# Patient Record
Sex: MALE | Race: WHITE | NOT HISPANIC OR LATINO | URBAN - METROPOLITAN AREA
[De-identification: names, ages, dates, MRNs, and addresses within clinical notes are randomized per-mention and may not be internally consistent; named-entity substitution may affect disease eponyms.]

---

## 2017-02-22 ENCOUNTER — OUTPATIENT (OUTPATIENT)
Dept: OUTPATIENT SERVICES | Facility: HOSPITAL | Age: 37
LOS: 1 days | Discharge: HOME | End: 2017-02-22

## 2017-06-27 DIAGNOSIS — R74.8 ABNORMAL LEVELS OF OTHER SERUM ENZYMES: ICD-10-CM

## 2017-06-27 DIAGNOSIS — E55.9 VITAMIN D DEFICIENCY, UNSPECIFIED: ICD-10-CM

## 2017-06-27 DIAGNOSIS — R53.81 OTHER MALAISE: ICD-10-CM

## 2017-06-27 DIAGNOSIS — M32.9 SYSTEMIC LUPUS ERYTHEMATOSUS, UNSPECIFIED: ICD-10-CM

## 2017-06-27 DIAGNOSIS — K90.0 CELIAC DISEASE: ICD-10-CM

## 2017-06-27 DIAGNOSIS — L40.50 ARTHROPATHIC PSORIASIS, UNSPECIFIED: ICD-10-CM

## 2017-06-27 DIAGNOSIS — M25.50 PAIN IN UNSPECIFIED JOINT: ICD-10-CM

## 2017-06-27 DIAGNOSIS — E29.1 TESTICULAR HYPOFUNCTION: ICD-10-CM

## 2017-06-27 DIAGNOSIS — Z00.00 ENCOUNTER FOR GENERAL ADULT MEDICAL EXAMINATION WITHOUT ABNORMAL FINDINGS: ICD-10-CM

## 2017-06-27 DIAGNOSIS — E03.9 HYPOTHYROIDISM, UNSPECIFIED: ICD-10-CM

## 2017-06-27 DIAGNOSIS — M35.9 SYSTEMIC INVOLVEMENT OF CONNECTIVE TISSUE, UNSPECIFIED: ICD-10-CM

## 2017-06-27 DIAGNOSIS — R94.6 ABNORMAL RESULTS OF THYROID FUNCTION STUDIES: ICD-10-CM

## 2017-06-27 DIAGNOSIS — R06.02 SHORTNESS OF BREATH: ICD-10-CM

## 2017-06-27 DIAGNOSIS — K51.00 ULCERATIVE (CHRONIC) PANCOLITIS WITHOUT COMPLICATIONS: ICD-10-CM

## 2017-06-27 DIAGNOSIS — E11.9 TYPE 2 DIABETES MELLITUS WITHOUT COMPLICATIONS: ICD-10-CM

## 2018-03-26 ENCOUNTER — OUTPATIENT (OUTPATIENT)
Dept: OUTPATIENT SERVICES | Facility: HOSPITAL | Age: 38
LOS: 1 days | Discharge: HOME | End: 2018-03-26

## 2018-03-26 VITALS
HEART RATE: 82 BPM | SYSTOLIC BLOOD PRESSURE: 188 MMHG | HEIGHT: 67 IN | RESPIRATION RATE: 20 BRPM | DIASTOLIC BLOOD PRESSURE: 94 MMHG | WEIGHT: 139.99 LBS | OXYGEN SATURATION: 98 % | TEMPERATURE: 97 F

## 2018-03-26 DIAGNOSIS — Z98.890 OTHER SPECIFIED POSTPROCEDURAL STATES: Chronic | ICD-10-CM

## 2018-03-26 DIAGNOSIS — Z01.818 ENCOUNTER FOR OTHER PREPROCEDURAL EXAMINATION: ICD-10-CM

## 2018-03-26 LAB
ANION GAP SERPL CALC-SCNC: 13 MMOL/L — SIGNIFICANT CHANGE UP (ref 7–14)
BASOPHILS # BLD AUTO: 0.04 K/UL — SIGNIFICANT CHANGE UP (ref 0–0.2)
BASOPHILS NFR BLD AUTO: 0.7 % — SIGNIFICANT CHANGE UP (ref 0–1)
BUN SERPL-MCNC: 17 MG/DL — SIGNIFICANT CHANGE UP (ref 10–20)
CALCIUM SERPL-MCNC: 9.1 MG/DL — SIGNIFICANT CHANGE UP (ref 8.5–10.1)
CHLORIDE SERPL-SCNC: 100 MMOL/L — SIGNIFICANT CHANGE UP (ref 98–110)
CO2 SERPL-SCNC: 27 MMOL/L — SIGNIFICANT CHANGE UP (ref 17–32)
CREAT SERPL-MCNC: 1 MG/DL — SIGNIFICANT CHANGE UP (ref 0.7–1.5)
EOSINOPHIL # BLD AUTO: 0.06 K/UL — SIGNIFICANT CHANGE UP (ref 0–0.7)
EOSINOPHIL NFR BLD AUTO: 1 % — SIGNIFICANT CHANGE UP (ref 0–8)
GLUCOSE SERPL-MCNC: 67 MG/DL — LOW (ref 70–99)
HCT VFR BLD CALC: 44 % — SIGNIFICANT CHANGE UP (ref 42–52)
HGB BLD-MCNC: 14.4 G/DL — SIGNIFICANT CHANGE UP (ref 14–18)
IMM GRANULOCYTES NFR BLD AUTO: 0.2 % — SIGNIFICANT CHANGE UP (ref 0.1–0.3)
LYMPHOCYTES # BLD AUTO: 1.51 K/UL — SIGNIFICANT CHANGE UP (ref 1.2–3.4)
LYMPHOCYTES # BLD AUTO: 26.3 % — SIGNIFICANT CHANGE UP (ref 20.5–51.1)
MCHC RBC-ENTMCNC: 25.7 PG — LOW (ref 27–31)
MCHC RBC-ENTMCNC: 32.7 G/DL — SIGNIFICANT CHANGE UP (ref 32–37)
MCV RBC AUTO: 78.6 FL — LOW (ref 80–94)
MONOCYTES # BLD AUTO: 0.38 K/UL — SIGNIFICANT CHANGE UP (ref 0.1–0.6)
MONOCYTES NFR BLD AUTO: 6.6 % — SIGNIFICANT CHANGE UP (ref 1.7–9.3)
NEUTROPHILS # BLD AUTO: 3.75 K/UL — SIGNIFICANT CHANGE UP (ref 1.4–6.5)
NEUTROPHILS NFR BLD AUTO: 65.2 % — SIGNIFICANT CHANGE UP (ref 42.2–75.2)
NRBC # BLD: 0 /100 WBCS — SIGNIFICANT CHANGE UP (ref 0–0)
PLATELET # BLD AUTO: 323 K/UL — SIGNIFICANT CHANGE UP (ref 130–400)
POTASSIUM SERPL-MCNC: 4.3 MMOL/L — SIGNIFICANT CHANGE UP (ref 3.5–5)
POTASSIUM SERPL-SCNC: 4.3 MMOL/L — SIGNIFICANT CHANGE UP (ref 3.5–5)
RBC # BLD: 5.6 M/UL — SIGNIFICANT CHANGE UP (ref 4.7–6.1)
RBC # FLD: 13.5 % — SIGNIFICANT CHANGE UP (ref 11.5–14.5)
SODIUM SERPL-SCNC: 140 MMOL/L — SIGNIFICANT CHANGE UP (ref 135–146)
WBC # BLD: 5.75 K/UL — SIGNIFICANT CHANGE UP (ref 4.8–10.8)
WBC # FLD AUTO: 5.75 K/UL — SIGNIFICANT CHANGE UP (ref 4.8–10.8)

## 2018-03-26 NOTE — H&P PST ADULT - PMH
Bladder disease or syndrome  congenital bladder exotrophy Bladder disease or syndrome  congenital bladder exotrophy  Eczema

## 2018-03-26 NOTE — H&P PST ADULT - REASON FOR ADMISSION
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

## 2018-03-26 NOTE — H&P PST ADULT - PSH
History of bladder surgery  bladder reconstruction surgery [id number]  History of colon resection  s/p bowel obstruction 2008 History of bladder surgery  multiple bladder reconstruction surgery [id number]  History of colon resection  s/p bowel obstruction 2008

## 2018-03-27 NOTE — ASU PATIENT PROFILE, ADULT - PSH
History of bladder surgery  multiple bladder reconstruction surgery [id number]  History of colon resection  s/p bowel obstruction 2008

## 2018-03-28 ENCOUNTER — OUTPATIENT (OUTPATIENT)
Dept: OUTPATIENT SERVICES | Facility: HOSPITAL | Age: 38
LOS: 1 days | Discharge: HOME | End: 2018-03-28

## 2018-03-28 ENCOUNTER — RESULT REVIEW (OUTPATIENT)
Age: 38
End: 2018-03-28

## 2018-03-28 VITALS
RESPIRATION RATE: 17 BRPM | OXYGEN SATURATION: 99 % | SYSTOLIC BLOOD PRESSURE: 158 MMHG | TEMPERATURE: 98 F | HEIGHT: 67 IN | WEIGHT: 139.99 LBS | HEART RATE: 85 BPM | DIASTOLIC BLOOD PRESSURE: 100 MMHG

## 2018-03-28 VITALS
HEART RATE: 80 BPM | DIASTOLIC BLOOD PRESSURE: 96 MMHG | RESPIRATION RATE: 19 BRPM | OXYGEN SATURATION: 99 % | TEMPERATURE: 98 F | SYSTOLIC BLOOD PRESSURE: 160 MMHG
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
## 2018-03-28 DIAGNOSIS — Z98.890 OTHER SPECIFIED POSTPROCEDURAL STATES: Chronic | ICD-10-CM

## 2018-03-28 DIAGNOSIS — S83.241A OTHER TEAR OF MEDIAL MENISCUS, CURRENT INJURY, RIGHT KNEE, INITIAL ENCOUNTER: ICD-10-CM

## 2018-03-28 DIAGNOSIS — M65.862 OTHER SYNOVITIS AND TENOSYNOVITIS, LEFT LOWER LEG: ICD-10-CM
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
## 2018-03-28 RX ORDER — ONDANSETRON 8 MG/1
4 TABLET, FILM COATED ORAL ONCE
Qty: 0 | Refills: 0 | Status: DISCONTINUED | OUTPATIENT
Start: 2018-03-28 | End: 2018-04-12

## 2018-03-28 RX ORDER — MORPHINE SULFATE 50 MG/1
4 CAPSULE, EXTENDED RELEASE ORAL
Qty: 0 | Refills: 0 | Status: DISCONTINUED | OUTPATIENT
Start: 2018-03-28 | End: 2018-03-28

## 2018-03-28 RX ORDER — SODIUM CHLORIDE 9 MG/ML
1000 INJECTION, SOLUTION INTRAVENOUS
Qty: 0 | Refills: 0 | Status: DISCONTINUED | OUTPATIENT
Start: 2018-03-28 | End: 2018-04-12

## 2018-03-28 RX ORDER — OXYCODONE AND ACETAMINOPHEN 5; 325 MG/1; MG/1
2 TABLET ORAL ONCE
Qty: 0 | Refills: 0 | Status: DISCONTINUED | OUTPATIENT
Start: 2018-03-28 | End: 2018-03-28

## 2018-03-28 RX ORDER — MEPERIDINE HYDROCHLORIDE 50 MG/ML
25 INJECTION INTRAMUSCULAR; INTRAVENOUS; SUBCUTANEOUS ONCE
Qty: 0 | Refills: 0 | Status: DISCONTINUED | OUTPATIENT
Start: 2018-03-28 | End: 2018-03-28

## 2018-03-28 RX ORDER — MORPHINE SULFATE 50 MG/1
2 CAPSULE, EXTENDED RELEASE ORAL
Qty: 0 | Refills: 0 | Status: DISCONTINUED | OUTPATIENT
Start: 2018-03-28 | End: 2018-03-28

## 2018-03-28 RX ADMIN — MORPHINE SULFATE 2 MILLIGRAM(S): 50 CAPSULE, EXTENDED RELEASE ORAL at 16:14

## 2018-03-28 RX ADMIN — OXYCODONE AND ACETAMINOPHEN 2 TABLET(S): 5; 325 TABLET ORAL at 17:12

## 2018-03-28 RX ADMIN — MORPHINE SULFATE 4 MILLIGRAM(S): 50 CAPSULE, EXTENDED RELEASE ORAL at 15:26

## 2018-03-28 RX ADMIN — SODIUM CHLORIDE 100 MILLILITER(S): 9 INJECTION, SOLUTION INTRAVENOUS at 15:25

## 2018-03-28 RX ADMIN — MORPHINE SULFATE 2 MILLIGRAM(S): 50 CAPSULE, EXTENDED RELEASE ORAL at 15:55

## 2018-03-28 NOTE — ASU DISCHARGE PLAN (ADULT/PEDIATRIC). - SPECIAL INSTRUCTIONS
Post Operative Instructions for Knee Surgery    Your Surgery Included  [ ] Menisectomy  [ ] Meniscus Repair					  [ ] Synovectomy/Plica Excision	  [ ] Debridement/Chondroplasty  [ ] Lysis of Adhesions  [ ] ACL reconstruction			  [ ] PCL Reconstruction  [ ] Other:  	  Call our office ([phone]) immediately if you experience any of the following:  •	Excessive bleeding or pus like drainage at the incision site  •	Uncontrollable pain not relieved by pain medication  •	Excessive swelling or redness at the incision site  •	Fever above 101.5 degrees not controlled with Tylenol or Motrin  •	Shortness of Breath  •	Any foul odor or blistering from the surgery site    Pain Management: You were given one or more prescriptions before leaving the hospital. Have the prescriptions filled at a pharmacy on your way home and follow the instructions on the bottles.   Regional Anesthesia Injections (Blocks): You may have been given a regional nerve block either before or after surgery. This may numb your leg for 24-36 hours  	*Proceed with caution when weight bearing on your leg    Diet: Eat a bland diet for the first day after surgery. Progress your diet as tolerated. Constipation may occur with Narcotic usage, contact our office if you are experiencing constipation.    Activity: Limit your activity during the first 48 hours, keep your leg elevated with pillows under your heel. After the first 48 hours at home, increase your activity level based on your symptoms.    Dressing Change: Remove the dressing on the 3rd day. It is normal for some blood to be seen on the dressings. It is also normal for you to see apparent bruising on the skin around your knee when you remove the dressing. If present, leave the steri-strip tape across the incisions. If you are concerned by the drainage or the appearance of your knee, please call one of the numbers listed. Keep covered with Band-Aids/bandages.    Showering: You may shower on the 5th day after surgery if the wound is dry and clean, but do not let the wound soak in water until sutures are removed. Do not submerge in any water until after your postoperative appointment in clinic.    Weight Bearing:						  [ ] Weight bearing as tolerated		  [ ] Nonweight bearing				  [ ] Other:						    Operative Knee Range of Motion  [ ] Full range of motion  [ ] ROM 0-90 deg  [ ] Other:    Knee Exercises: You may do these exercises for 2-5 mins five times a day in order to help regain your range of motion.  [ ] Quad Sets: Begin activating your quadriceps muscle by driving your knee downward into full knee extension while seated on a table or bed   with a towel rolled and propped under your heel  [ ] Straight Leg Raise: While alon your quadriceps muscle, lift your fully extended leg to the level of your non-operative knee  [ ] Heel Slides: With the knee straight, slide your heel slowly toward your buttocks, hold at the endpoint for 10-15 seconds, then slowly straighten  [ ] Ankle Pumps: With your knee straight, move your ankle in a "pumping"  fashion to activate your calf and leg muscle     Follow Up: As Scheduled

## 2018-03-28 NOTE — BRIEF OPERATIVE NOTE - PROCEDURE
<<-----Click on this checkbox to enter Procedure Arthroscopic partial synovectomy of knee  03/28/2018    Active  GERMAIN

## 2018-03-28 NOTE — BRIEF OPERATIVE NOTE - POST-OP DX
Complex tear of medial meniscus of right knee as current injury, subsequent encounter  03/28/2018    Active  Jose Ribeiro  Synovitis of right knee  03/28/2018    Active  Jose Ribeiro

## 2018-03-28 NOTE — BRIEF OPERATIVE NOTE - PRE-OP DX
Complex tear of medial meniscus of right knee as current injury, subsequent encounter  03/28/2018    Active  Jose Ribeiro

## 2018-04-07 LAB — SURGICAL PATHOLOGY STUDY: SIGNIFICANT CHANGE UP

## 2018-12-14 ENCOUNTER — RX RENEWAL (OUTPATIENT)
Age: 38
End: 2018-12-14

## 2018-12-14 PROBLEM — Z00.00 ENCOUNTER FOR PREVENTIVE HEALTH EXAMINATION: Status: ACTIVE | Noted: 2018-12-14

## 2019-01-15 ENCOUNTER — RX RENEWAL (OUTPATIENT)
Age: 39
End: 2019-01-15

## 2019-01-17 NOTE — ASU PREOP CHECKLIST - HEART RATE (BEATS/MIN)
Migraine Headache   WHAT YOU SHOULD KNOW:   A migraine is a severe headache  The pain can be so severe that it interferes with your daily activities  A migraine can last a few hours up to several days  The exact cause of migraines is not known  It may be caused by changes in your body chemicals and extra sensitive nerves in your brain  AFTER YOU LEAVE:   Medicines:  Take medicine as soon as you feel a migraine begin  · Pain medicine: You may need medicine to take away or decrease pain  You may need a doctor's order for this medicine  Do not wait until the pain is severe before you take your medicine  · Migraine medicines: These are used to help prevent a migraine or stop it once it starts  · Antinausea medicine: This medicine may be given to calm your stomach and to help prevent vomiting  They can also help relieve pain  · Take your medicine as directed  Call your healthcare provider if you think your medicine is not helping or if you have side effects  Tell him if you are allergic to any medicine  Keep a list of the medicines, vitamins, and herbs you take  Include the amounts, and when and why you take them  Bring the list or the pill bottles to follow-up visits  Carry your medicine list with you in case of an emergency  Manage your symptoms:   · Rest:  Rest in a dark, quiet room  This will help decrease your pain  · Ice:  Ice helps decrease pain  Use an ice pack or put crushed ice in a plastic bag  Cover the ice pack with a towel and place it on your head where it hurts for 15 to 20 minutes every hour  · Heat:  Heat helps decrease pain and muscle spasms  Use a small towel dampened with warm water or a heating pad, or sit in a warm bath  Apply heat on the area for 20 to 30 minutes every 2 hours  You may alternate heat and ice  Keep a headache diary:  Write down when your migraines start and stop  Include your symptoms and what you were doing when a migraine began   Record what you ate or drank for 24 hours before the migraine started  Describe the pain and where it hurts  Keep track of what you did to treat your migraine and whether it worked  Follow up with your primary healthcare provider or neurologist as directed:  Bring your headache diary with you when you see your primary healthcare provider  Write down your questions so you remember to ask them during your visits  Prevent another migraine:   · Do not smoke: If you smoke, it is never too late to quit  Tobacco smoke can trigger a migraine  It can also cause heart disease, lung disease, cancer, and other health problems  Quitting smoking will improve your health and the health of those around you  If you smoke, ask for information about how to stop  · Do not drink alcohol:  Alcohol can trigger a migraine  It can also interfere with the medicines used to treat your migraine  · Get regular exercise:  Exercise may help prevent migraines  Talk to your primary healthcare provider about the best exercise plan for you  · Manage stress:  Stress may trigger a migraine  Learn new ways to relax, such as deep breathing  · Stick to a sleep schedule:  Go to bed and get up at the same time each day  · Eat regular meals:  Include healthy foods such as include fruit, vegetables, whole-grain breads, low-fat dairy products, beans, lean meat, and fish  Avoid trigger foods like chocolate, hard cheese, and red wine  Foods that contain gluten, nitrates, MSG, or artificial sweeteners may also trigger migraines  Caffeine, which is often used to treat migraines, can also trigger them  Contact your primary healthcare provider or neurologist if:   · You have a fever  · Your migraines interfere with your daily activities  · Your medicines or treatments stop working  · You have questions about your condition or care    Seek care immediately or call 911 if:   · You have a headache that seems different or much worse than your usual migraine headache  · You have a severe headache with a fever or a stiff neck  · You have new problems with speech, vision, balance, or movement  · You feel like you are going to faint, you become confused, or you have a seizure  © 2014 [address] is for End User's use only and may not be sold, redistributed or otherwise used for commercial purposes  All illustrations and images included in CareNotes® are the copyrighted property of A D A M , Inc  or Jose Luis Blanco  The above information is an  only  It is not intended as medical advice for individual conditions or treatments  Talk to your doctor, nurse or pharmacist before following any medical regimen to see if it is safe and effective for you  85

## 2019-04-12 ENCOUNTER — MESSAGE (OUTPATIENT)
Age: 39
End: 2019-04-12

## 2019-05-01 ENCOUNTER — OUTPATIENT (OUTPATIENT)
Dept: OUTPATIENT SERVICES | Facility: HOSPITAL | Age: 39
LOS: 1 days | Discharge: HOME | End: 2019-05-01

## 2019-05-01 DIAGNOSIS — Z98.890 OTHER SPECIFIED POSTPROCEDURAL STATES: Chronic | ICD-10-CM

## 2019-05-02 DIAGNOSIS — N41.0 ACUTE PROSTATITIS: ICD-10-CM

## 2019-05-02 PROBLEM — L30.9 DERMATITIS, UNSPECIFIED: Chronic | Status: ACTIVE | Noted: 2018-03-26

## 2019-05-08 LAB — BACTERIA WND CULT: ABNORMAL

## 2019-05-12 ENCOUNTER — OUTPATIENT (OUTPATIENT)
Dept: OUTPATIENT SERVICES | Facility: HOSPITAL | Age: 39
LOS: 1 days | Discharge: HOME | End: 2019-05-12
Payer: COMMERCIAL

## 2019-05-12 DIAGNOSIS — Z98.890 OTHER SPECIFIED POSTPROCEDURAL STATES: Chronic | ICD-10-CM

## 2019-05-12 DIAGNOSIS — R36.9 URETHRAL DISCHARGE, UNSPECIFIED: ICD-10-CM

## 2019-05-12 PROCEDURE — 72196 MRI PELVIS W/DYE: CPT | Mod: 26

## 2019-05-17 ENCOUNTER — RX RENEWAL (OUTPATIENT)
Age: 39
End: 2019-05-17

## 2019-05-23 ENCOUNTER — LABORATORY RESULT (OUTPATIENT)
Age: 39
End: 2019-05-23

## 2019-05-24 ENCOUNTER — OUTPATIENT (OUTPATIENT)
Dept: OUTPATIENT SERVICES | Facility: HOSPITAL | Age: 39
LOS: 1 days | Discharge: HOME | End: 2019-05-24

## 2019-05-24 DIAGNOSIS — Z98.890 OTHER SPECIFIED POSTPROCEDURAL STATES: Chronic | ICD-10-CM

## 2019-05-24 DIAGNOSIS — N39.0 URINARY TRACT INFECTION, SITE NOT SPECIFIED: ICD-10-CM

## 2019-05-29 LAB
APPEARANCE: ABNORMAL
BACTERIA UR CULT: NORMAL
BILIRUBIN URINE: NEGATIVE
BLOOD URINE: NEGATIVE
COLOR: YELLOW
GLUCOSE QUALITATIVE U: NEGATIVE MG/DL
KETONES URINE: NEGATIVE
LEUKOCYTE ESTERASE URINE: NEGATIVE
NITRITE URINE: NEGATIVE
PH URINE: 6.5
PROTEIN URINE: NEGATIVE MG/DL
SPECIFIC GRAVITY URINE: 1.01
UROBILINOGEN URINE: 0.2 MG/DL (ref 0.2–?)

## 2019-05-30 ENCOUNTER — RX RENEWAL (OUTPATIENT)
Age: 39
End: 2019-05-30

## 2019-06-03 RX ORDER — LEVOFLOXACIN 500 MG/1
500 TABLET, FILM COATED ORAL DAILY
Qty: 5 | Refills: 1 | Status: COMPLETED | COMMUNITY
Start: 2018-12-14 | End: 2019-06-03

## 2019-06-03 RX ORDER — AMOXICILLIN 500 MG/1
500 TABLET, FILM COATED ORAL
Qty: 20 | Refills: 0 | Status: COMPLETED | COMMUNITY
Start: 2019-05-08 | End: 2019-06-03

## 2019-06-03 RX ORDER — CEFPODOXIME PROXETIL 200 MG/1
200 TABLET, FILM COATED ORAL
Qty: 14 | Refills: 0 | Status: COMPLETED | COMMUNITY
Start: 2019-01-15 | End: 2019-06-03

## 2019-06-03 RX ORDER — CEFPODOXIME PROXETIL 200 MG/1
200 TABLET, FILM COATED ORAL TWICE DAILY
Qty: 28 | Refills: 0 | Status: COMPLETED | COMMUNITY
Start: 2019-04-12 | End: 2019-06-03

## 2019-06-04 ENCOUNTER — APPOINTMENT (OUTPATIENT)
Dept: UROLOGY | Facility: HOSPITAL | Age: 39
End: 2019-06-04
Payer: COMMERCIAL

## 2019-06-04 ENCOUNTER — RESULT REVIEW (OUTPATIENT)
Age: 39
End: 2019-06-04

## 2019-06-04 ENCOUNTER — OUTPATIENT (OUTPATIENT)
Dept: OUTPATIENT SERVICES | Facility: HOSPITAL | Age: 39
LOS: 1 days | Discharge: HOME | End: 2019-06-04
Payer: COMMERCIAL

## 2019-06-04 VITALS
RESPIRATION RATE: 18 BRPM | HEART RATE: 73 BPM | DIASTOLIC BLOOD PRESSURE: 87 MMHG | TEMPERATURE: 98 F | HEIGHT: 67 IN | SYSTOLIC BLOOD PRESSURE: 136 MMHG | WEIGHT: 134.92 LBS

## 2019-06-04 VITALS — SYSTOLIC BLOOD PRESSURE: 117 MMHG | RESPIRATION RATE: 20 BRPM | HEART RATE: 57 BPM | DIASTOLIC BLOOD PRESSURE: 78 MMHG

## 2019-06-04 DIAGNOSIS — Z98.890 OTHER SPECIFIED POSTPROCEDURAL STATES: Chronic | ICD-10-CM
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
## 2019-06-04 PROCEDURE — 88305 TISSUE EXAM BY PATHOLOGIST: CPT | Mod: 26

## 2019-06-04 PROCEDURE — 52310 CYSTOSCOPY AND TREATMENT: CPT

## 2019-06-04 RX ORDER — HYDROMORPHONE HYDROCHLORIDE 2 MG/ML
0.5 INJECTION INTRAMUSCULAR; INTRAVENOUS; SUBCUTANEOUS
Refills: 0 | Status: DISCONTINUED | OUTPATIENT
Start: 2019-06-04 | End: 2019-06-04

## 2019-06-04 RX ORDER — SODIUM CHLORIDE 9 MG/ML
1000 INJECTION, SOLUTION INTRAVENOUS
Refills: 0 | Status: DISCONTINUED | OUTPATIENT
Start: 2019-06-04 | End: 2019-06-04

## 2019-06-04 RX ORDER — ONDANSETRON 8 MG/1
4 TABLET, FILM COATED ORAL
Refills: 0 | Status: DISCONTINUED | OUTPATIENT
Start: 2019-06-04 | End: 2019-06-04

## 2019-06-04 RX ORDER — HYDROMORPHONE HYDROCHLORIDE 2 MG/ML
1 INJECTION INTRAMUSCULAR; INTRAVENOUS; SUBCUTANEOUS
Refills: 0 | Status: DISCONTINUED | OUTPATIENT
Start: 2019-06-04 | End: 2019-06-04

## 2019-06-04 RX ORDER — MEPERIDINE HYDROCHLORIDE 50 MG/ML
12.5 INJECTION INTRAMUSCULAR; INTRAVENOUS; SUBCUTANEOUS ONCE
Refills: 0 | Status: DISCONTINUED | OUTPATIENT
Start: 2019-06-04 | End: 2019-06-04

## 2019-06-04 RX ADMIN — HYDROMORPHONE HYDROCHLORIDE 1 MILLIGRAM(S): 2 INJECTION INTRAMUSCULAR; INTRAVENOUS; SUBCUTANEOUS at 16:00

## 2019-06-04 RX ADMIN — HYDROMORPHONE HYDROCHLORIDE 1 MILLIGRAM(S): 2 INJECTION INTRAMUSCULAR; INTRAVENOUS; SUBCUTANEOUS at 15:15

## 2019-06-04 RX ADMIN — HYDROMORPHONE HYDROCHLORIDE 1 MILLIGRAM(S): 2 INJECTION INTRAMUSCULAR; INTRAVENOUS; SUBCUTANEOUS at 15:30

## 2019-06-04 RX ADMIN — HYDROMORPHONE HYDROCHLORIDE 0.5 MILLIGRAM(S): 2 INJECTION INTRAMUSCULAR; INTRAVENOUS; SUBCUTANEOUS at 15:05

## 2019-06-04 RX ADMIN — MEPERIDINE HYDROCHLORIDE 12.5 MILLIGRAM(S): 50 INJECTION INTRAMUSCULAR; INTRAVENOUS; SUBCUTANEOUS at 15:15

## 2019-06-04 RX ADMIN — SODIUM CHLORIDE 150 MILLILITER(S): 9 INJECTION, SOLUTION INTRAVENOUS at 15:05

## 2019-06-04 RX ADMIN — MEPERIDINE HYDROCHLORIDE 12.5 MILLIGRAM(S): 50 INJECTION INTRAMUSCULAR; INTRAVENOUS; SUBCUTANEOUS at 15:04

## 2019-06-04 RX ADMIN — HYDROMORPHONE HYDROCHLORIDE 0.5 MILLIGRAM(S): 2 INJECTION INTRAMUSCULAR; INTRAVENOUS; SUBCUTANEOUS at 15:15

## 2019-06-04 NOTE — BRIEF OPERATIVE NOTE - NSICDXBRIEFPOSTOP_GEN_ALL_CORE_FT
POST-OP DIAGNOSIS:  Chronic prostatitis 04-Jun-2019 15:09:50  Dinh Clemons  Urethral discharge 04-Jun-2019 15:09:40  Dinh Clemons

## 2019-06-04 NOTE — PRE-ANESTHESIA EVALUATION ADULT - NSANTHOSAYNRD_GEN_A_CORE
No. ELSY screening performed.  STOP BANG Legend: 0-2 = LOW Risk; 3-4 = INTERMEDIATE Risk; 5-8 = HIGH Risk

## 2019-06-04 NOTE — CHART NOTE - NSCHARTNOTEFT_GEN_A_CORE
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, and urethral syndrome      ____  Intubated  TV:______       Rate: ______      FiO2: ______    _x___  Patent Airway    ____  Full return of protective reflexes    ____  Full recovery from anesthesia / back to baseline     Vitals:   T:  98         R:       12           BP:         140/86          Sat:    100%                P:  99      Mental Status:  __x__ Awake   _____ Alert   _____ Drowsy   _____ Sedated    Nausea/Vomiting:  _x___ NO  ______Yes,   See Post - Op Orders          Pain Scale (0-10):  _____    Treatment: ____ None    __x__ See Post - Op/PCA Orders    Post - Operative Fluids:   ____ Oral   ___x_ See Post - Op Orders    Plan: Discharge:   __x__Home       _____Floor     _____Critical Care    _____  Other:_________________    Comments: Uneventful intraoperative course. No anesthesia issues or complications noted.  Patient stable upon arrival to PACU. Report given to RN. Discharge when criteria met.

## 2019-06-04 NOTE — BRIEF OPERATIVE NOTE - NSICDXBRIEFPREOP_GEN_ALL_CORE_FT
PRE-OP DIAGNOSIS:  Urethral discharge in male 04-Jun-2019 15:08:28  Dinh Clemons  Urethral discharge 04-Jun-2019 15:05:21  Dinh Clemons

## 2019-06-04 NOTE — ASU PATIENT PROFILE, ADULT - PSH
History of bladder surgery  multiple bladder reconstruction surgery [id number]  History of colon resection  s/p bowel obstruction 2008

## 2019-06-04 NOTE — BRIEF OPERATIVE NOTE - OPERATION/FINDINGS
bulbous urethra with matted hair  prostatic massage leads to expression of cloudy fluid from BOTH ejaculatory ducts

## 2019-06-04 NOTE — ASU DISCHARGE PLAN (ADULT/PEDIATRIC) - CALL YOUR DOCTOR IF YOU HAVE ANY OF THE FOLLOWING:
Fever greater than (need to indicate Fahrenheit or Celsius)/Inability to tolerate liquids or foods/Bleeding that does not stop/Nausea and vomiting that does not stop/Swelling that gets worse

## 2019-06-04 NOTE — PRE-ANESTHESIA EVALUATION ADULT - NSANTHADDINFOFT_GEN_ALL_CORE
38 y/o WM evaluated for cystoscopy.  ASA 2.  Plan [place].  Plan, benefits, foreseeable risks, viable alternatives discussed with patient and all his pertinent questions answered and he understands and elects to proceed.

## 2019-06-04 NOTE — BRIEF OPERATIVE NOTE - NSICDXBRIEFPROCEDURE_GEN_ALL_CORE_FT
PROCEDURES:  Exam under anesthesia, rectum 04-Jun-2019 15:05:00  Dinh Clemons  Correction of trichiasis by epilation with forceps 04-Jun-2019 15:04:06  Dinh Clemons  Cystoscopy in adult 04-Jun-2019 15:03:29  Dinh Clemons

## 2019-06-04 NOTE — ASU DISCHARGE PLAN (ADULT/PEDIATRIC) - CARE PROVIDER_API CALL
Dinh Clemons)  Urology  [address] [place] [address], [address]  [place] [address]  Phone: [phone]  Fax: [phone]  Follow Up Time:

## 2019-06-07 LAB — SURGICAL PATHOLOGY STUDY: SIGNIFICANT CHANGE UP

## 2019-06-08 DIAGNOSIS — N41.1 CHRONIC PROSTATITIS: ICD-10-CM

## 2019-06-08 DIAGNOSIS — R36.9 URETHRAL DISCHARGE, UNSPECIFIED: ICD-10-CM

## 2019-06-08 DIAGNOSIS — I10 ESSENTIAL (PRIMARY) HYPERTENSION: ICD-10-CM

## 2019-06-08 DIAGNOSIS — T19.0XXA FOREIGN BODY IN URETHRA, INITIAL ENCOUNTER: ICD-10-CM

## 2019-06-08 DIAGNOSIS — Z91.040 LATEX ALLERGY STATUS: ICD-10-CM
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
## 2019-06-12 ENCOUNTER — CHART COPY (OUTPATIENT)
Age: 39
End: 2019-06-12

## 2019-06-18 ENCOUNTER — FORM ENCOUNTER (OUTPATIENT)
Age: 39
End: 2019-06-18

## 2019-06-19 ENCOUNTER — OUTPATIENT (OUTPATIENT)
Dept: OUTPATIENT SERVICES | Facility: HOSPITAL | Age: 39
LOS: 1 days | Discharge: HOME | End: 2019-06-19
Payer: COMMERCIAL

## 2019-06-19 DIAGNOSIS — N41.0 ACUTE PROSTATITIS: ICD-10-CM

## 2019-06-19 DIAGNOSIS — Z98.890 OTHER SPECIFIED POSTPROCEDURAL STATES: Chronic | ICD-10-CM

## 2019-06-19 PROCEDURE — 76872 US TRANSRECTAL: CPT | Mod: 26

## 2019-06-19 PROCEDURE — 76856 US EXAM PELVIC COMPLETE: CPT | Mod: 26

## 2019-06-21 ENCOUNTER — OUTPATIENT (OUTPATIENT)
Dept: OUTPATIENT SERVICES | Facility: HOSPITAL | Age: 39
LOS: 1 days | Discharge: HOME | End: 2019-06-21

## 2019-06-21 ENCOUNTER — OTHER (OUTPATIENT)
Age: 39
End: 2019-06-21

## 2019-06-21 ENCOUNTER — APPOINTMENT (OUTPATIENT)
Dept: UROLOGY | Facility: CLINIC | Age: 39
End: 2019-06-21
Payer: COMMERCIAL

## 2019-06-21 ENCOUNTER — LABORATORY RESULT (OUTPATIENT)
Age: 39
End: 2019-06-21

## 2019-06-21 DIAGNOSIS — N39.0 URINARY TRACT INFECTION, SITE NOT SPECIFIED: ICD-10-CM

## 2019-06-21 DIAGNOSIS — R10.9 UNSPECIFIED ABDOMINAL PAIN: ICD-10-CM

## 2019-06-21 DIAGNOSIS — Z98.890 OTHER SPECIFIED POSTPROCEDURAL STATES: Chronic | ICD-10-CM

## 2019-06-21 DIAGNOSIS — Z82.49 FAMILY HISTORY OF ISCHEMIC HEART DISEASE AND OTHER DISEASES OF THE CIRCULATORY SYSTEM: ICD-10-CM

## 2019-06-21 DIAGNOSIS — Z78.9 OTHER SPECIFIED HEALTH STATUS: ICD-10-CM

## 2019-06-21 DIAGNOSIS — R36.9 URETHRAL DISCHARGE, UNSPECIFIED: ICD-10-CM

## 2019-06-21 PROCEDURE — 99214 OFFICE O/P EST MOD 30 MIN: CPT

## 2019-06-21 NOTE — LETTER BODY
[Dear  ___] : Dear  [unfilled], [Courtesy Letter:] : I had the pleasure of seeing your patient, [unfilled], in my office today. [Please see my note below.] : Please see my note below. [Sincerely,] : Sincerely, [FreeTextEntry2] : Rohit Bowden M.D.\par [address] [facility] [address]\[place] [address] \par

## 2019-06-21 NOTE — ASSESSMENT
[FreeTextEntry1] : John history is quite complex and he understands that I am not a pediatric urologist and have limited experience with Epispadius is and exstrophy closure. However there is no one on [place] that deals with adults who have had such surgery and he prefers that we manage it how we can and I have discussed the case with Dr. Emery. My impression is that he has a smoldering prostatitis and thankfully the trans rectal ultrasound done earlier this week did not show any evidence of an abscess or corpora amalacia so surgical resection is it needed. However he did not have pus in the urethra the pus was coming out of the ejaculatory ducts so we going to treat this like I would any chronic prostatitis. He will get one month of full force i.e. amoxicillin twice per day, if he responds well in a month is no further expressed prostatic secretions then I’ll switch him to 250 per night for two months and will see if that works. If it does not he may have to go seek [facility] for example one of the doctors at [facility] are does do this or we may see if someone out at [facility] has experience with this but I don’t think so.

## 2019-06-21 NOTE — HISTORY OF PRESENT ILLNESS
[3] : 3 [Right Flank] : right flank [Right Lower Back] : right lower back [Dull] : dull [Gradual] : gradual [Constant] : constantly [None] : No exacerbating factors are noted [Analgesics] : analgesics [FreeTextEntry1] : John is a 31-year-old male, with a history of bladder exstrophy status post multiple bladder augments in the past, who we have been following for recurrent urinary tract infection, questionable prostatitis, and history of ureteral discharge. He  is status post cystoscopy, with removal of hair, secondary to his hypospadias repair, in June 2019. Post procedure he was doing well without any complications. He recently underwent a TRUS of the prostate, to evaluate for fluid collection, which was negative for abscess or calcifications with prostatitis. He presents today complaining of suprapubic pressure with right flank pain. He denies any nausea, vomiting, chills, fever, light headedness, dizziness, gross hematuria, or further urological/constitutional symptoms. [de-identified] : Pressure [de-identified] : Catheterizations

## 2019-06-21 NOTE — LETTER HEADER
[FreeTextEntry3] : Dinh Clemons M.D.\par Director of Urology\par [facility]\par [address] [place] [address], [address]\[place] [address]

## 2019-06-21 NOTE — PHYSICAL EXAM
[General Appearance - Well Developed] : well developed [General Appearance - Well Nourished] : well nourished [Normal Appearance] : normal appearance [Well Groomed] : well groomed [General Appearance - In No Acute Distress] : no acute distress [Abdomen Soft] : soft [Costovertebral Angle Tenderness] : no ~M costovertebral angle tenderness [Abdomen Tenderness] : non-tender [Testes Tenderness] : no tenderness of the testes [] : no respiratory distress [Respiration, Rhythm And Depth] : normal respiratory rhythm and effort [Exaggerated Use Of Accessory Muscles For Inspiration] : no accessory muscle use [Oriented To Time, Place, And Person] : oriented to person, place, and time [Mood] : the mood was normal [Affect] : the affect was normal [Not Anxious] : not anxious [Normal Station and Gait] : the gait and station were normal for the patient's age [No Focal Deficits] : no focal deficits [FreeTextEntry1] : scars from surgery, jack urethra site without d/c

## 2019-07-02 LAB
APPEARANCE: ABNORMAL
BACTERIA SPEC CULT: ABNORMAL
BACTERIA UR CULT: ABNORMAL
BILIRUBIN URINE: NEGATIVE
BLOOD URINE: ABNORMAL
COLOR: YELLOW
GLUCOSE QUALITATIVE U: NEGATIVE MG/DL
KETONES URINE: NEGATIVE
LEUKOCYTE ESTERASE URINE: ABNORMAL
NITRITE URINE: NEGATIVE
PH URINE: 6.5
PROTEIN URINE: 100 MG/DL
SPECIFIC GRAVITY URINE: 1.01
UROBILINOGEN URINE: 0.2 MG/DL (ref 0.2–?)

## 2019-07-08 ENCOUNTER — APPOINTMENT (OUTPATIENT)
Dept: UROLOGY | Facility: CLINIC | Age: 39
End: 2019-07-08
Payer: COMMERCIAL

## 2019-07-08 ENCOUNTER — OUTPATIENT (OUTPATIENT)
Dept: OUTPATIENT SERVICES | Facility: HOSPITAL | Age: 39
LOS: 1 days | Discharge: HOME | End: 2019-07-08

## 2019-07-08 DIAGNOSIS — Z98.890 OTHER SPECIFIED POSTPROCEDURAL STATES: Chronic | ICD-10-CM

## 2019-07-08 PROCEDURE — 99213 OFFICE O/P EST LOW 20 MIN: CPT

## 2019-07-08 RX ORDER — AMOXICILLIN 500 MG/1
500 CAPSULE ORAL
Qty: 20 | Refills: 0 | Status: COMPLETED | COMMUNITY
Start: 2019-05-17

## 2019-07-08 NOTE — LETTER BODY
[Dear  ___] : Dear  [unfilled], [Courtesy Letter:] : I had the pleasure of seeing your patient, [unfilled], in my office today. [Sincerely,] : Sincerely, [Please see my note below.] : Please see my note below. [FreeTextEntry2] : Rohit Bowden M.D.\par [address] [facility] [address]\[place] [address] \par

## 2019-07-08 NOTE — PHYSICAL EXAM
[General Appearance - Well Developed] : well developed [General Appearance - Well Nourished] : well nourished [Normal Appearance] : normal appearance [Well Groomed] : well groomed [General Appearance - In No Acute Distress] : no acute distress [Abdomen Soft] : soft [Costovertebral Angle Tenderness] : no ~M costovertebral angle tenderness [Abdomen Tenderness] : non-tender [Urethral Meatus] : meatus normal [Penis Abnormality] : normal circumcised penis [Urinary Bladder Findings] : the bladder was normal on palpation [Scrotum] : the scrotum was normal [Testes Tenderness] : no tenderness of the testes [Testes Mass (___cm)] : there were no testicular masses [Edema] : no peripheral edema [Exaggerated Use Of Accessory Muscles For Inspiration] : no accessory muscle use [Respiration, Rhythm And Depth] : normal respiratory rhythm and effort [] : no respiratory distress [Oriented To Time, Place, And Person] : oriented to person, place, and time [Affect] : the affect was normal [Mood] : the mood was normal [Not Anxious] : not anxious [No Focal Deficits] : no focal deficits [Normal Station and Gait] : the gait and station were normal for the patient's age [Inguinal Lymph Nodes Enlarged Bilaterally] : inguinal [Femoral Lymph Nodes Enlarged Bilaterally] : femoral [FreeTextEntry1] : Scant Urethral discharge obtained for culture

## 2019-07-08 NOTE — HISTORY OF PRESENT ILLNESS
[None] : None [FreeTextEntry1] : John is a 31-year-ol with a complex urological history including bladder exstrophy, who developed recurrent urinary tract infection and likely chronic prostatitis with urethral discharge. He has been on Levaquin 500 mg p.o. q. daily x2 weeks for urinary tract infection with likely prostatitis.\par \par On Levaquin his felt a significant improvement in his symptoms with a near resolution of his urethral and a complete resolution of his flank pain and bladder irritation. Last dose was this AM\par \par He presents today for urethral discharge culture.

## 2019-07-08 NOTE — LETTER HEADER
[FreeTextEntry3] : Dinh Clemons M.D.\par Director of Urology\par [facility]\par [address] [place] [address], [address]\[place] [address]

## 2019-07-08 NOTE — ASSESSMENT
[FreeTextEntry1] : We will send off a culture and he will begin Levaquin 250 q. daily x2 months he'll followup after for review and possible EPS.\par \par If this is indeed chronic prostatitis and hopefully by hitting it with both barrels we may be able to force it into quiescence. He is aware that no matter what we do chronic prostatitis is just that chronic and can come back. However if this takes care of it then all well and good.

## 2019-07-09 DIAGNOSIS — R36.9 URETHRAL DISCHARGE, UNSPECIFIED: ICD-10-CM

## 2019-07-12 ENCOUNTER — APPOINTMENT (OUTPATIENT)
Dept: UROLOGY | Facility: CLINIC | Age: 39
End: 2019-07-12
Payer: COMMERCIAL

## 2019-07-12 LAB — BACTERIA SPEC CULT: ABNORMAL

## 2019-07-12 PROCEDURE — 99213 OFFICE O/P EST LOW 20 MIN: CPT

## 2019-07-12 NOTE — HISTORY OF PRESENT ILLNESS
[Currently Experiencing ___] :  [unfilled] [3] : 3 [Right Flank] : right flank [Right Lower Back] : right lower back [Dull] : dull [Gradual] : gradual [Constant] : constantly [None] : No exacerbating factors are noted [Analgesics] : analgesics [FreeTextEntry1] : John has been followed intermittently. He had removal of hair from the urethra in June and was found to have purulent discharge from the ejaculatory ducts during prostatic massage at the time of the cystoscopy/urethroscopy. He was initially put on amoxicillin and then when his most recent culture came back with Citrobacter in addition to the enterococcus he was switched to Levaquin. He has been on that for a month at full strength, 500 mg per day feels the best that he has in years. However prior to switching him to suppression we had him milk at a small sample. It was very scant but it was sent for culture. He is here for review [de-identified] : Pressure [de-identified] : Catheterizations

## 2019-07-12 NOTE — PHYSICAL EXAM
[General Appearance - Well Developed] : well developed [General Appearance - Well Nourished] : well nourished [Normal Appearance] : normal appearance [Well Groomed] : well groomed [General Appearance - In No Acute Distress] : no acute distress [Abdomen Soft] : soft [Abdomen Tenderness] : non-tender [Costovertebral Angle Tenderness] : no ~M costovertebral angle tenderness [] : no respiratory distress [Respiration, Rhythm And Depth] : normal respiratory rhythm and effort [Exaggerated Use Of Accessory Muscles For Inspiration] : no accessory muscle use [Oriented To Time, Place, And Person] : oriented to person, place, and time [Affect] : the affect was normal [Mood] : the mood was normal [Not Anxious] : not anxious [Normal Station and Gait] : the gait and station were normal for the patient's age [Testes Tenderness] : no tenderness of the testes [FreeTextEntry1] : no obvious d/c with known post op scars

## 2019-07-12 NOTE — ASSESSMENT
[FreeTextEntry1] : The Citrobacter is gone but he still is enterococcus and the question is why. This is rather complex anatomy, we are trying to treat it as a standard chronic a call prostatitis and he is subjectively better but he still is positive cultures, something we have seen another chronic prostatitis patients but that is a diagnosis of exclusion. The question was do we switch back to amoxicillin a stay on Levaquin in my feeling is stay on Levaquin as the Citrobacter could still be smoldering within the prostate. I’m going to switch him to suppression as to take full strength Levaquin for another 60 days is a large dose of antibacterial. If this does not work out he’s going to need to see someone who specializes in adults who had exstrophy as newborns. It could be this is just good old standard chronic prostatitis and is not related to his exstrophy but that again is something I’d rather someone who knows what they are go based on my own doing tell me rather than I just go based on my own supposition.

## 2020-02-05 ENCOUNTER — APPOINTMENT (OUTPATIENT)
Dept: UROLOGY | Facility: CLINIC | Age: 40
End: 2020-02-05
Payer: COMMERCIAL

## 2020-02-05 PROCEDURE — 99212 OFFICE O/P EST SF 10 MIN: CPT

## 2020-02-05 PROCEDURE — 81000 URINALYSIS NONAUTO W/SCOPE: CPT

## 2020-02-05 RX ORDER — LEVOFLOXACIN 250 MG/1
250 TABLET, FILM COATED ORAL DAILY
Qty: 60 | Refills: 0 | Status: COMPLETED | COMMUNITY
Start: 2019-06-25 | End: 2020-02-05

## 2020-02-05 RX ORDER — LOSARTAN POTASSIUM 50 MG/1
50 TABLET, FILM COATED ORAL
Qty: 30 | Refills: 0 | Status: DISCONTINUED | COMMUNITY
Start: 2019-12-02

## 2020-02-05 RX ORDER — AMOXICILLIN 500 MG/1
500 TABLET, FILM COATED ORAL
Qty: 60 | Refills: 0 | Status: COMPLETED | COMMUNITY
Start: 2019-06-21 | End: 2020-02-05

## 2020-02-05 NOTE — LETTER HEADER
[FreeTextEntry3] : Dinh Clemons M.D.\par Director of Urology\par [facility]\par [address] [place] [address], [address]\[place] [address]

## 2020-02-05 NOTE — LETTER BODY
[Dear  ___] : Dear  [unfilled], [Courtesy Letter:] : I had the pleasure of seeing your patient, [unfilled], in my office today. [Please see my note below.] : Please see my note below. [Sincerely,] : Sincerely, [FreeTextEntry2] : Rohit Bowden M.D.\par [address] [facility] [address]\[place] [address] \par

## 2020-02-05 NOTE — HISTORY OF PRESENT ILLNESS
[3] : 3 [Gradual] : gradual [None] : No exacerbating factors are noted [Analgesics] : analgesics [FreeTextEntry1] : John is a 40-year-old male with complex urologic history. He status post urethral hair removal the last time was June 2019. He had urethral discharge likely secondary from chronic prostatitis which has resolved status post long-term antibiotic therapy and cystoscopy with a pediatric scope\par \par Approximately one week ago he had minimal return of the symptoms, not as prominent as before however, causing him some urethral discomfort. [de-identified] : Urethral

## 2020-02-05 NOTE — ASSESSMENT
[FreeTextEntry1] : We obtained a catheterized urine sample and EPS. We will wait until culture comes back to decide upon treatment. He may need to seek  sub specialty at [facility] for further investigation. Additionally he may need repeat urethroscopy for continued hair removal.

## 2020-02-05 NOTE — PHYSICAL EXAM
[General Appearance - Well Developed] : well developed [General Appearance - Well Nourished] : well nourished [Normal Appearance] : normal appearance [Well Groomed] : well groomed [General Appearance - In No Acute Distress] : no acute distress [Abdomen Soft] : soft [Abdomen Tenderness] : non-tender [Costovertebral Angle Tenderness] : no ~M costovertebral angle tenderness [Urethral Meatus] : meatus normal [Penis Abnormality] : normal circumcised penis [Urinary Bladder Findings] : the bladder was normal on palpation [Scrotum] : the scrotum was normal [Testes Mass (___cm)] : there were no testicular masses [No Prostate Nodules] : no prostate nodules [Edema] : no peripheral edema [] : no respiratory distress [Respiration, Rhythm And Depth] : normal respiratory rhythm and effort [Exaggerated Use Of Accessory Muscles For Inspiration] : no accessory muscle use [Oriented To Time, Place, And Person] : oriented to person, place, and time [Affect] : the affect was normal [Mood] : the mood was normal [Not Anxious] : not anxious [Normal Station and Gait] : the gait and station were normal for the patient's age [Femoral Lymph Nodes Enlarged Bilaterally] : femoral [Inguinal Lymph Nodes Enlarged Bilaterally] : inguinal [FreeTextEntry1] : mild scant discharge with expression

## 2020-02-06 LAB
APPEARANCE: CLEAR
BILIRUBIN URINE: NEGATIVE
BLOOD URINE: NEGATIVE
COLOR: YELLOW
GLUCOSE QUALITATIVE U: NEGATIVE
KETONES URINE: NEGATIVE
LEUKOCYTE ESTERASE URINE: NEGATIVE
NITRITE URINE: NEGATIVE
PH URINE: 6.5
PROTEIN URINE: NORMAL
SPECIFIC GRAVITY URINE: 1.02
UROBILINOGEN URINE: NORMAL

## 2020-02-10 LAB — URINE CULTURE <10: ABNORMAL

## 2020-02-12 LAB — BACTERIA FLD CULT: ABNORMAL

## 2020-04-26 ENCOUNTER — MESSAGE (OUTPATIENT)
Age: 40
End: 2020-04-26

## 2020-05-06 LAB
SARS-COV-2 IGG SERPL IA-ACNC: 0 INDEX
SARS-COV-2 IGG SERPL QL IA: NEGATIVE

## 2020-05-22 DIAGNOSIS — Z87.440 PERSONAL HISTORY OF URINARY (TRACT) INFECTIONS: ICD-10-CM

## 2020-05-22 DIAGNOSIS — R53.83 OTHER FATIGUE: ICD-10-CM

## 2020-05-24 LAB
ALBUMIN SERPL ELPH-MCNC: 5.1 G/DL
ALP BLD-CCNC: 103 U/L
ALT SERPL-CCNC: 19 U/L
ANION GAP SERPL CALC-SCNC: 21 MMOL/L
AST SERPL-CCNC: 24 U/L
BASOPHILS # BLD AUTO: 0.05 K/UL
BASOPHILS NFR BLD AUTO: 0.9 %
BILIRUB DIRECT SERPL-MCNC: <0.2 MG/DL
BILIRUB INDIRECT SERPL-MCNC: >0.2 MG/DL
BILIRUB SERPL-MCNC: 0.4 MG/DL
BUN SERPL-MCNC: 19 MG/DL
CALCIUM SERPL-MCNC: 9.7 MG/DL
CHLORIDE SERPL-SCNC: 101 MMOL/L
CO2 SERPL-SCNC: 20 MMOL/L
CREAT SERPL-MCNC: 1.2 MG/DL
EOSINOPHIL # BLD AUTO: 0.13 K/UL
EOSINOPHIL NFR BLD AUTO: 2.3 %
ESTRADIOL SERPL-MCNC: 27 PG/ML
GLUCOSE SERPL-MCNC: 59 MG/DL
HCT VFR BLD CALC: 49.5 %
HGB BLD-MCNC: 14.6 G/DL
IMM GRANULOCYTES NFR BLD AUTO: 0.2 %
LH SERPL-ACNC: 5.7 IU/L
LYMPHOCYTES # BLD AUTO: 1.9 K/UL
LYMPHOCYTES NFR BLD AUTO: 33.9 %
MAN DIFF?: NORMAL
MCHC RBC-ENTMCNC: 25.6 PG
MCHC RBC-ENTMCNC: 29.5 G/DL
MCV RBC AUTO: 86.7 FL
MONOCYTES # BLD AUTO: 0.42 K/UL
MONOCYTES NFR BLD AUTO: 7.5 %
NEUTROPHILS # BLD AUTO: 3.09 K/UL
NEUTROPHILS NFR BLD AUTO: 55.2 %
PLATELET # BLD AUTO: 364 K/UL
POTASSIUM SERPL-SCNC: 5.2 MMOL/L
PROT SERPL-MCNC: 7.5 G/DL
RBC # BLD: 5.71 M/UL
RBC # FLD: 13.9 %
SODIUM SERPL-SCNC: 142 MMOL/L
WBC # FLD AUTO: 5.6 K/UL

## 2020-05-31 LAB
SHBG SERPL-SCNC: 40 NMOL/L
TESTOST BND SERPL-MCNC: 16.5 NG/DL
TESTOSTERONE BIOAVAILABLE: 204 NG/DL
TESTOSTERONE TOTAL S: 658 NG/DL

## 2020-06-01 LAB
% FREE TESTOSTERONE - ESO: 1.4 %
FREE TESTOSTERONE - ESO: 89 PG/ML
SHBG-ESOTERIX: 44.9 NMOL/L
TESTOSTERONE SERUM - ESO: 635 NG/DL

## 2020-11-24 NOTE — H&P PST ADULT - AS BP NONINV METHOD
How Severe Is Your Acne?: mild
Is This A New Presentation, Or A Follow-Up?: Acne
Additional Comments (Use Complete Sentences): Patient has been doing this regimen for 2 months and has not noticed too much change.
anxious

## 2020-12-23 PROBLEM — Z87.440 HISTORY OF URINARY TRACT INFECTION: Status: RESOLVED | Noted: 2018-12-14 | Resolved: 2020-12-23

## 2021-01-24 ENCOUNTER — TRANSCRIPTION ENCOUNTER (OUTPATIENT)
Age: 41
End: 2021-01-24

## 2021-10-06 ENCOUNTER — APPOINTMENT (OUTPATIENT)
Dept: GASTROENTEROLOGY | Facility: CLINIC | Age: 41
End: 2021-10-06

## 2021-10-06 DIAGNOSIS — R11.0 NAUSEA: ICD-10-CM

## 2021-10-06 DIAGNOSIS — R14.0 ABDOMINAL DISTENSION (GASEOUS): ICD-10-CM

## 2021-10-06 DIAGNOSIS — K63.89 OTHER SPECIFIED DISEASES OF INTESTINE: ICD-10-CM

## 2021-10-06 DIAGNOSIS — K59.00 CONSTIPATION, UNSPECIFIED: ICD-10-CM

## 2021-10-06 DIAGNOSIS — R19.7 DIARRHEA, UNSPECIFIED: ICD-10-CM

## 2021-10-08 ENCOUNTER — LABORATORY RESULT (OUTPATIENT)
Age: 41
End: 2021-10-08

## 2021-10-16 ENCOUNTER — RESULT REVIEW (OUTPATIENT)
Age: 41
End: 2021-10-16

## 2021-10-16 ENCOUNTER — OUTPATIENT (OUTPATIENT)
Dept: OUTPATIENT SERVICES | Facility: HOSPITAL | Age: 41
LOS: 1 days | Discharge: HOME | End: 2021-10-16
Payer: COMMERCIAL

## 2021-10-16 DIAGNOSIS — K63.89 OTHER SPECIFIED DISEASES OF INTESTINE: ICD-10-CM

## 2021-10-16 DIAGNOSIS — Z98.890 OTHER SPECIFIED POSTPROCEDURAL STATES: Chronic | ICD-10-CM

## 2021-10-16 PROCEDURE — 76705 ECHO EXAM OF ABDOMEN: CPT | Mod: 26

## 2021-10-17 ENCOUNTER — TRANSCRIPTION ENCOUNTER (OUTPATIENT)
Age: 41
End: 2021-10-17

## 2021-10-20 ENCOUNTER — APPOINTMENT (OUTPATIENT)
Dept: GASTROENTEROLOGY | Facility: CLINIC | Age: 41
End: 2021-10-20
Payer: COMMERCIAL

## 2021-10-20 ENCOUNTER — APPOINTMENT (OUTPATIENT)
Dept: GASTROENTEROLOGY | Facility: CLINIC | Age: 41
End: 2021-10-20

## 2021-10-20 DIAGNOSIS — R10.30 LOWER ABDOMINAL PAIN, UNSPECIFIED: ICD-10-CM

## 2021-10-20 DIAGNOSIS — R14.1 GAS PAIN: ICD-10-CM

## 2021-10-20 PROCEDURE — 99443: CPT

## 2021-10-20 NOTE — HISTORY OF PRESENT ILLNESS
[FreeTextEntry4] : GERRY [de-identified] : Patient is a 41-year-old gentleman who presented for follow-up.  Patient since last visit had complete blood work done which was grossly unremarkable.  Patient had abdominal ultrasound in which a 0.6 cm hemangioma was noted.  Patient does feel better since last visit overall.

## 2021-10-20 NOTE — ASSESSMENT
[FreeTextEntry1] : Patient is a 41-year-old gentleman who presented for follow-up.  Patient since last visit had complete blood work done which was grossly unremarkable.  Patient had abdominal ultrasound in which a 0.6 cm hemangioma was noted.  Patient does feel better since last visit overall. Patient likely with SIBO.\par \par Abdominal Pains\par - Labs and abdominal U/S reviewed with patient\par - May have sub-clinical Celiac \par - Setup for EGD

## 2021-11-16 ENCOUNTER — LABORATORY RESULT (OUTPATIENT)
Age: 41
End: 2021-11-16

## 2021-11-16 ENCOUNTER — OUTPATIENT (OUTPATIENT)
Dept: OUTPATIENT SERVICES | Facility: HOSPITAL | Age: 41
LOS: 1 days | Discharge: HOME | End: 2021-11-16

## 2021-11-16 DIAGNOSIS — Z11.59 ENCOUNTER FOR SCREENING FOR OTHER VIRAL DISEASES: ICD-10-CM

## 2021-11-16 DIAGNOSIS — Z98.890 OTHER SPECIFIED POSTPROCEDURAL STATES: Chronic | ICD-10-CM

## 2021-11-19 ENCOUNTER — TRANSCRIPTION ENCOUNTER (OUTPATIENT)
Age: 41
End: 2021-11-19

## 2021-11-19 ENCOUNTER — RESULT REVIEW (OUTPATIENT)
Age: 41
End: 2021-11-19

## 2021-11-19 ENCOUNTER — OUTPATIENT (OUTPATIENT)
Dept: OUTPATIENT SERVICES | Facility: HOSPITAL | Age: 41
LOS: 1 days | Discharge: HOME | End: 2021-11-19
Payer: COMMERCIAL

## 2021-11-19 VITALS
HEIGHT: 67 IN | DIASTOLIC BLOOD PRESSURE: 83 MMHG | TEMPERATURE: 98 F | HEART RATE: 82 BPM | WEIGHT: 139.99 LBS | SYSTOLIC BLOOD PRESSURE: 127 MMHG | RESPIRATION RATE: 18 BRPM

## 2021-11-19 VITALS — HEART RATE: 68 BPM | RESPIRATION RATE: 20 BRPM | SYSTOLIC BLOOD PRESSURE: 111 MMHG | DIASTOLIC BLOOD PRESSURE: 68 MMHG

## 2021-11-19 DIAGNOSIS — K21.9 GASTRO-ESOPHAGEAL REFLUX DISEASE WITHOUT ESOPHAGITIS: ICD-10-CM

## 2021-11-19 DIAGNOSIS — K29.80 DUODENITIS WITHOUT BLEEDING: ICD-10-CM

## 2021-11-19 DIAGNOSIS — K29.50 UNSPECIFIED CHRONIC GASTRITIS WITHOUT BLEEDING: ICD-10-CM

## 2021-11-19 DIAGNOSIS — K31.7 POLYP OF STOMACH AND DUODENUM: ICD-10-CM

## 2021-11-19 DIAGNOSIS — I10 ESSENTIAL (PRIMARY) HYPERTENSION: ICD-10-CM

## 2021-11-19 DIAGNOSIS — Z98.890 OTHER SPECIFIED POSTPROCEDURAL STATES: Chronic | ICD-10-CM

## 2021-11-19 DIAGNOSIS — Z91.040 LATEX ALLERGY STATUS: ICD-10-CM

## 2021-11-19 DIAGNOSIS — K44.9 DIAPHRAGMATIC HERNIA WITHOUT OBSTRUCTION OR GANGRENE: ICD-10-CM

## 2021-11-19 DIAGNOSIS — K20.90 ESOPHAGITIS, UNSPECIFIED WITHOUT BLEEDING: ICD-10-CM

## 2021-11-19 PROCEDURE — 88305 TISSUE EXAM BY PATHOLOGIST: CPT | Mod: 26

## 2021-11-19 PROCEDURE — 88312 SPECIAL STAINS GROUP 1: CPT | Mod: 26

## 2021-11-19 PROCEDURE — 43239 EGD BIOPSY SINGLE/MULTIPLE: CPT

## 2021-11-19 RX ORDER — CEFOXITIN 1 G/1
1 INJECTION, POWDER, FOR SOLUTION INTRAVENOUS
Qty: 0 | Refills: 0 | DISCHARGE

## 2021-11-19 RX ORDER — AMOXICILLIN 250 MG/5ML
0 SUSPENSION, RECONSTITUTED, ORAL (ML) ORAL
Qty: 0 | Refills: 0 | DISCHARGE

## 2021-11-19 NOTE — ASU DISCHARGE PLAN (ADULT/PEDIATRIC) - NS MD DC FALL RISK RISK
DISPLAY PLAN FREE TEXT
For information on Fall & Injury Prevention, visit: [email or web address] OR  [email or web address] OR  https://www.cdc.gov/steadi/patient.html

## 2021-11-19 NOTE — ASU PATIENT PROFILE, ADULT - NSICDXPASTSURGICALHX_GEN_ALL_CORE_FT
PAST SURGICAL HISTORY:  History of bladder surgery multiple bladder reconstruction surgery [id number]    History of colon resection s/p bowel obstruction 2008

## 2021-11-19 NOTE — ASU DISCHARGE PLAN (ADULT/PEDIATRIC) - MODE OF TRANSPORTATION
Subjective:       Patient ID:  Rubina Rowe is a 69 y.o. female who presents for   Chief Complaint   Patient presents with    Warts     R index, follow up     VV FU  Last seen 7/16/18, treated with cryo, improved        Warts  - Follow-up  Diagnosis: Wart.  Symptom course: resolved  Currently using: Cryo.  AFFECTED LOCATIONS F/U: R index finger.  Signs / symptoms: asymptomatic        Review of Systems   Constitutional: Negative for fever, chills, weight loss, weight gain, fatigue, night sweats and malaise.   Skin: Positive for daily sunscreen use, activity-related sunscreen use and wears hat.   Hematologic/Lymphatic: Does not bruise/bleed easily.        Objective:    Physical Exam   Constitutional: She appears well-developed and well-nourished. No distress.   Neurological: She is alert and oriented to person, place, and time. She is not disoriented.   Psychiatric: She has a normal mood and affect.   Skin:                Diagram Legend     Erythematous scaling macule/papule c/w actinic keratosis       Vascular papule c/w angioma      Pigmented verrucoid papule/plaque c/w seborrheic keratosis      Yellow umbilicated papule c/w sebaceous hyperplasia      Irregularly shaped tan macule c/w lentigo     1-2 mm smooth white papules consistent with Milia      Movable subcutaneous cyst with punctum c/w epidermal inclusion cyst      Subcutaneous movable cyst c/w pilar cyst      Firm pink to brown papule c/w dermatofibroma      Pedunculated fleshy papule(s) c/w skin tag(s)      Evenly pigmented macule c/w junctional nevus     Mildly variegated pigmented, slightly irregular-bordered macule c/w mildly atypical nevus      Flesh colored to evenly pigmented papule c/w intradermal nevus       Pink pearly papule/plaque c/w basal cell carcinoma      Erythematous hyperkeratotic cursted plaque c/w SCC      Surgical scar with no sign of skin cancer recurrence      Open and closed comedones      Inflammatory papules and pustules       Verrucoid papule consistent consistent with wart     Erythematous eczematous patches and plaques     Dystrophic onycholytic nail with subungual debris c/w onychomycosis     Umbilicated papule    Erythematous-base heme-crusted tan verrucoid plaque consistent with inflamed seborrheic keratosis     Erythematous Silvery Scaling Plaque c/w Psoriasis     See annotation      Assessment / Plan:        Common wart    Cryosurgery procedure note:    Verbal consent from the patient is obtained. Liquid nitrogen cryosurgery is applied to 1 verruca without prior paring, as detailed in the physical exam, to produce a freeze injury. 3 consecutive freeze thaw cycles are applied to each verruca. The patient is aware that blisters (possibly blood blisters) may form.           Follow-up if symptoms worsen or fail to improve.   Ambulatory

## 2021-11-19 NOTE — ASU PATIENT PROFILE, ADULT - NSICDXPASTMEDICALHX_GEN_ALL_CORE_FT
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

## 2021-11-22 LAB — SURGICAL PATHOLOGY STUDY: SIGNIFICANT CHANGE UP

## 2021-12-11 ENCOUNTER — NON-APPOINTMENT (OUTPATIENT)
Age: 41
End: 2021-12-11

## 2022-01-11 ENCOUNTER — NON-APPOINTMENT (OUTPATIENT)
Age: 42
End: 2022-01-11

## 2022-01-13 ENCOUNTER — NON-APPOINTMENT (OUTPATIENT)
Age: 42
End: 2022-01-13

## 2022-01-14 RX ORDER — AMOXICILLIN AND CLAVULANATE POTASSIUM 875; 125 MG/1; MG/1
875-125 TABLET, COATED ORAL TWICE DAILY
Qty: 28 | Refills: 2 | Status: COMPLETED | COMMUNITY
Start: 2021-01-25 | End: 2022-01-14

## 2022-01-14 RX ORDER — AMOXICILLIN AND CLAVULANATE POTASSIUM 875; 125 MG/1; MG/1
875-125 TABLET, COATED ORAL TWICE DAILY
Qty: 20 | Refills: 0 | Status: COMPLETED | COMMUNITY
Start: 2021-10-06 | End: 2022-01-14

## 2022-01-14 RX ORDER — HUMAN PAPILLOMAVIRUS 9-VALENT VACCINE, RECOMBINANT 30; 40; 60; 40; 20; 20; 20; 20; 20 UG/.5ML; UG/.5ML; UG/.5ML; UG/.5ML; UG/.5ML; UG/.5ML; UG/.5ML; UG/.5ML; UG/.5ML
INJECTION, SUSPENSION INTRAMUSCULAR ONCE
Qty: 1 | Refills: 2 | Status: COMPLETED | COMMUNITY
Start: 2021-12-12 | End: 2022-01-14

## 2022-01-14 RX ORDER — AMPICILLIN 500 MG/1
500 CAPSULE ORAL 4 TIMES DAILY
Qty: 140 | Refills: 0 | Status: COMPLETED | COMMUNITY
Start: 2020-02-07 | End: 2022-01-14

## 2022-01-14 NOTE — PHYSICAL EXAM
[General Appearance - Well Developed] : well developed [General Appearance - Well Nourished] : well nourished [Normal Appearance] : normal appearance [Well Groomed] : well groomed [General Appearance - In No Acute Distress] : no acute distress [Abdomen Soft] : soft [Abdomen Tenderness] : non-tender [Costovertebral Angle Tenderness] : no ~M costovertebral angle tenderness [FreeTextEntry1] : Scarred penis, there was a lump situated in the second quarter of the shaft starting from the pubic area, the lump is to the right lateral region of the anterior urethra and I cannot tell if it is clogged tear within the urethra or the development of Peyronie's [] : no respiratory distress [Respiration, Rhythm And Depth] : normal respiratory rhythm and effort [Exaggerated Use Of Accessory Muscles For Inspiration] : no accessory muscle use [Oriented To Time, Place, And Person] : oriented to person, place, and time [Affect] : the affect was normal [Mood] : the mood was normal [Not Anxious] : not anxious [Normal Station and Gait] : the gait and station were normal for the patient's age [No Focal Deficits] : no focal deficits

## 2022-01-14 NOTE — HISTORY OF PRESENT ILLNESS
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  In June 2019 2-1/2 years ago I went in with a pediatric scope in the epilated the hair by pulling them out not by destroying the hair follicle at the time he knew this would come back slowly.  He has been doing well occasional infections but lately he has been having a tender lump in the proximal third of the shaft to the right of the urethra it appears to be within the urethra on the urethral wall.  It is tender both with touch and when he gets an erection.  He has not had the discharge in a while and does not feel feverish.  The question is, is this Peyronie's or is this here clumping again [3] : 3 [None] : There is no radiation [Burning] : burning [Gradual] : gradual [Intermittent] : intermittently [Mild] : mild [Worsening] : worsening [de-identified] : Right midshaft of the dorsum of the penis in his case this is\par \par Or intraurethral [de-identified] : Over the last few months [de-identified] : Touch or erections [de-identified] : Stopping touch and when the erection finishes

## 2022-01-14 NOTE — ASSESSMENT
[FreeTextEntry1] : We discussed the options.  Is been 2-1/2 years since we last took out the hair.  I again recommended he go to [place] to one of their superspecialist there to see if it is worth redoing the urethra with more modern techniques and get rid of the hairbearing skin.  He is loath to do that as right now he is functional that does not simple procedure and if it does not work he will have major problems.\par \par And that we decided we will look inside again if this is here I will try and pull it out and hopefully buy him some time for him to decide what to do

## 2022-01-14 NOTE — LETTER HEADER
[FreeTextEntry3] : Dinh Clemons M.D.\par Director Emeritus of Urology\par [facility]\par [address] [place] [address], [address]\[place] [address]

## 2022-01-20 ENCOUNTER — APPOINTMENT (OUTPATIENT)
Dept: GASTROENTEROLOGY | Facility: CLINIC | Age: 42
End: 2022-01-20
Payer: COMMERCIAL

## 2022-01-20 DIAGNOSIS — R12 HEARTBURN: ICD-10-CM

## 2022-01-20 DIAGNOSIS — K21.9 GASTRO-ESOPHAGEAL REFLUX DISEASE W/OUT ESOPHAGITIS: ICD-10-CM

## 2022-01-20 PROCEDURE — 99443: CPT

## 2022-01-21 NOTE — HISTORY OF PRESENT ILLNESS
[Home] : at home, [unfilled] , at the time of the visit. [Verbal consent obtained from patient] : the patient, [unfilled] [___ Month(s) Ago] : [unfilled] month(s) ago [_________] : Performed [unfilled] [FreeTextEntry4] : Richard [de-identified] : Patient is a 42-year-old gentleman who presented for follow-up.  Patient since last visit had complete blood work done which was grossly unremarkable.  Patient had abdominal ultrasound in which a 0.6 cm hemangioma was noted.  Patient does feel better since last visit overall. On Omeprazole in the morning and Famotidine at night but still has GERD. EGD done without H Pylori or IM.

## 2022-01-21 NOTE — ASSESSMENT
[FreeTextEntry1] : Patient is a 42-year-old gentleman who presented for follow-up.  Patient since last visit had complete blood work done which was grossly unremarkable.  Patient had abdominal ultrasound in which a 0.6 cm hemangioma was noted.  Patient does feel better since last visit overall. On Omeprazole in the morning and Famotidine at night but still has GERD. EGD done without H Pylori or IM.  Likely will change proton pump inhibitor and place him on it for morning and the evening as well.\par \par GERD gastritis noted on EGD without H. pylori or intestinal metaplasia\par Currently on omeprazole in the morning and famotidine at night\par Will change to Esomeprazole 40mg\par \par SIBO\par Failed on course of Flagyl x 2\par Trial of Cholestyramine- If not working will start Xifaxin

## 2022-01-24 ENCOUNTER — LABORATORY RESULT (OUTPATIENT)
Age: 42
End: 2022-01-24

## 2022-01-27 RX ORDER — AMOXICILLIN 500 MG/1
500 TABLET, FILM COATED ORAL 3 TIMES DAILY
Qty: 21 | Refills: 0 | Status: COMPLETED | COMMUNITY
Start: 2022-01-27 | End: 2022-02-03

## 2022-02-04 PROBLEM — I10 ESSENTIAL (PRIMARY) HYPERTENSION: Chronic | Status: ACTIVE | Noted: 2021-11-19

## 2022-02-09 ENCOUNTER — APPOINTMENT (OUTPATIENT)
Dept: PEDIATRIC UROLOGY | Facility: CLINIC | Age: 42
End: 2022-02-09
Payer: COMMERCIAL

## 2022-02-09 VITALS — WEIGHT: 144.3 LBS | TEMPERATURE: 98.2 F

## 2022-02-09 DIAGNOSIS — N48.89 OTHER SPECIFIED DISORDERS OF PENIS: ICD-10-CM

## 2022-02-09 PROCEDURE — 99204 OFFICE O/P NEW MOD 45 MIN: CPT

## 2022-02-09 NOTE — CONSULT LETTER
[FreeTextEntry1] : Dr. Clemons,\par \par I had the pleasure of seeing RODRIGO MIR. Please see my note below. Briefly, the area of concern on his penis may be his urethra as the proximal urethra tends to be more anterior and the corporal crux diverge much earlier than non-exstrophy patients. I believe you are correct he may indeed have bacterial prostatitis and his hair-bearing paraexstrophic flaps may be unrelated. Obtaining an MRI of his pelvis to better delineate his anatomy, started him on a long course of antibiotics for his prostatitis for now. He is having hair in his discharge again which I believe is best treated by laser ablation. I will keep you abreast on his progress.\par \par Thank you for allowing me to participate in the care of this patient. Please feel free to contact me with any questions\par \par Tomasz Steiner MD\par [facility]\par Pediatric Urology\par [facility]

## 2022-02-09 NOTE — HISTORY OF PRESENT ILLNESS
[TextBox_4] : 41 y/o M h/o bladder exstrophy s/p closure, augmentation x 3, ?bladder neck reconstruction, and mitrofanoff here for evaluation of perineal and penile pain, hair in the urethra. Pt has had multiple bouts of discharge from the urethra w/ associated pain of the perineum that has been treated successfully with antibiotics. An adult urology colleague prescribed these antibiotics and there is relief of his symptoms. In addition, he has hair growing in his urethra that was treated with laser ablation in [facility] by Dr. Sony Camp and depilation here at [facility]. Following ablation, he had 6 years of relief from the hair in his urethra and was told at the time major reconstruction of his urethra should be held off. Since then, he had his last depilation in June of 2019. From time to time, he would find hair in his urethra discharge. He recently noted a nodule on the dorsum of his penis, near the base. This is tender on palpation. \par \par Denies F/C

## 2022-02-09 NOTE — PHYSICAL EXAM
[Acute distress] : no acute distress [TextBox_37] : S/ND, multiple abdominal scars [TextBox_92] : Multiple scars on the phallus, a small pinpoint opening on the dorsum near the coronal sulcus, tenderness at the penopubic junction, no palpable plaque of the corporal bodies

## 2022-03-04 ENCOUNTER — OUTPATIENT (OUTPATIENT)
Dept: OUTPATIENT SERVICES | Facility: HOSPITAL | Age: 42
LOS: 1 days | Discharge: HOME | End: 2022-03-04
Payer: COMMERCIAL

## 2022-03-04 DIAGNOSIS — Z98.890 OTHER SPECIFIED POSTPROCEDURAL STATES: Chronic | ICD-10-CM

## 2022-03-04 DIAGNOSIS — N48.89 OTHER SPECIFIED DISORDERS OF PENIS: ICD-10-CM

## 2022-03-04 PROCEDURE — 72197 MRI PELVIS W/O & W/DYE: CPT | Mod: 26

## 2022-03-11 ENCOUNTER — NON-APPOINTMENT (OUTPATIENT)
Age: 42
End: 2022-03-11

## 2022-03-15 LAB — URINE CULTURE <10: ABNORMAL

## 2022-03-30 ENCOUNTER — OUTPATIENT (OUTPATIENT)
Dept: OUTPATIENT SERVICES | Facility: HOSPITAL | Age: 42
LOS: 1 days | End: 2022-03-30
Payer: COMMERCIAL

## 2022-03-30 VITALS
SYSTOLIC BLOOD PRESSURE: 120 MMHG | WEIGHT: 145.06 LBS | RESPIRATION RATE: 16 BRPM | DIASTOLIC BLOOD PRESSURE: 72 MMHG | OXYGEN SATURATION: 99 % | HEIGHT: 66.25 IN | TEMPERATURE: 97 F | HEART RATE: 70 BPM

## 2022-03-30 DIAGNOSIS — N36.9 URETHRAL DISORDER, UNSPECIFIED: ICD-10-CM

## 2022-03-30 DIAGNOSIS — Z98.890 OTHER SPECIFIED POSTPROCEDURAL STATES: Chronic | ICD-10-CM

## 2022-03-30 DIAGNOSIS — Z90.49 ACQUIRED ABSENCE OF OTHER SPECIFIED PARTS OF DIGESTIVE TRACT: Chronic | ICD-10-CM

## 2022-03-30 DIAGNOSIS — I10 ESSENTIAL (PRIMARY) HYPERTENSION: ICD-10-CM

## 2022-03-30 LAB
ANION GAP SERPL CALC-SCNC: 10 MMOL/L — SIGNIFICANT CHANGE UP (ref 7–14)
BUN SERPL-MCNC: 21 MG/DL — SIGNIFICANT CHANGE UP (ref 7–23)
CALCIUM SERPL-MCNC: 9.4 MG/DL — SIGNIFICANT CHANGE UP (ref 8.4–10.5)
CHLORIDE SERPL-SCNC: 103 MMOL/L — SIGNIFICANT CHANGE UP (ref 98–107)
CO2 SERPL-SCNC: 27 MMOL/L — SIGNIFICANT CHANGE UP (ref 22–31)
CREAT SERPL-MCNC: 1.22 MG/DL — SIGNIFICANT CHANGE UP (ref 0.5–1.3)
EGFR: 76 ML/MIN/1.73M2 — SIGNIFICANT CHANGE UP
GLUCOSE SERPL-MCNC: 89 MG/DL — SIGNIFICANT CHANGE UP (ref 70–99)
HCT VFR BLD CALC: 43 % — SIGNIFICANT CHANGE UP (ref 39–50)
HGB BLD-MCNC: 13.8 G/DL — SIGNIFICANT CHANGE UP (ref 13–17)
MCHC RBC-ENTMCNC: 25.7 PG — LOW (ref 27–34)
MCHC RBC-ENTMCNC: 32.1 GM/DL — SIGNIFICANT CHANGE UP (ref 32–36)
MCV RBC AUTO: 80.1 FL — SIGNIFICANT CHANGE UP (ref 80–100)
NRBC # BLD: 0 /100 WBCS — SIGNIFICANT CHANGE UP
NRBC # FLD: 0 K/UL — SIGNIFICANT CHANGE UP
PLATELET # BLD AUTO: 382 K/UL — SIGNIFICANT CHANGE UP (ref 150–400)
POTASSIUM SERPL-MCNC: 4.3 MMOL/L — SIGNIFICANT CHANGE UP (ref 3.5–5.3)
POTASSIUM SERPL-SCNC: 4.3 MMOL/L — SIGNIFICANT CHANGE UP (ref 3.5–5.3)
RBC # BLD: 5.37 M/UL — SIGNIFICANT CHANGE UP (ref 4.2–5.8)
RBC # FLD: 13.4 % — SIGNIFICANT CHANGE UP (ref 10.3–14.5)
SODIUM SERPL-SCNC: 140 MMOL/L — SIGNIFICANT CHANGE UP (ref 135–145)
WBC # BLD: 6.74 K/UL — SIGNIFICANT CHANGE UP (ref 3.8–10.5)
WBC # FLD AUTO: 6.74 K/UL — SIGNIFICANT CHANGE UP (ref 3.8–10.5)

## 2022-03-30 PROCEDURE — 93010 ELECTROCARDIOGRAM REPORT: CPT

## 2022-03-30 RX ORDER — OMEPRAZOLE 10 MG/1
1 CAPSULE, DELAYED RELEASE ORAL
Qty: 0 | Refills: 0 | DISCHARGE

## 2022-03-30 RX ORDER — SODIUM CHLORIDE 9 MG/ML
3 INJECTION INTRAMUSCULAR; INTRAVENOUS; SUBCUTANEOUS EVERY 8 HOURS
Refills: 0 | Status: DISCONTINUED | OUTPATIENT
Start: 2022-04-07 | End: 2022-04-21

## 2022-03-30 RX ORDER — SODIUM CHLORIDE 9 MG/ML
1000 INJECTION, SOLUTION INTRAVENOUS
Refills: 0 | Status: DISCONTINUED | OUTPATIENT
Start: 2022-04-07 | End: 2022-04-21

## 2022-03-30 RX ORDER — IBUPROFEN 200 MG
1 TABLET ORAL
Qty: 0 | Refills: 0 | DISCHARGE

## 2022-03-30 RX ORDER — VALSARTAN 80 MG/1
1 TABLET ORAL
Qty: 0 | Refills: 0 | DISCHARGE

## 2022-03-30 RX ORDER — TADALAFIL 10 MG/1
1 TABLET, FILM COATED ORAL
Qty: 0 | Refills: 0 | DISCHARGE

## 2022-03-30 NOTE — H&P PST ADULT - NSICDXPASTSURGICALHX_GEN_ALL_CORE_FT
PAST SURGICAL HISTORY:  H/O arthroscopy of left knee    H/O cystoscopy multiple    History of bladder surgery multiple bladder reconstruction surgery [id number]    History of colon resection 1980's    S/P small bowel resection 1999, 2010

## 2022-03-30 NOTE — H&P PST ADULT - PROBLEM SELECTOR PLAN 1
preop for cystoscopy, laser epilation on 4/7/22  preop instructions given, pt verbalized understanding  pt will take prescribed omeprazole AM of surgery for GI prophylaxis   pt informed COVID testing must be done 72 hours prior to surgery

## 2022-03-30 NOTE — H&P PST ADULT - WILL THE PATIENT ACCEPT THE PFIZER COVID-19 VACCINE IF ELIGIBLE AND IT IS AVAILABLE?
Returned call to pt.  Pt asked that appt on Monday be switched to virtual visit.   Instructed pt on how to set up visit.   Appt type switched.  Pt verbalized understanding.        ----- Message from Geetha Tripp sent at 6/10/2020  1:18 PM CDT -----  Contact: Anahi   tel:   [phone]   Caller says she would prefer a virtual appt. Over the phone for her upcoming appt. W/you.   Pls call to set this up, and discuss this.   Has some questions.     Pt.says she can even do the appt. Today if possible.     
Not applicable

## 2022-03-30 NOTE — H&P PST ADULT - HISTORY OF PRESENT ILLNESS
41 y/o male with h/o bladder exstrophy s/p multiple bladder reconstructions surgeries presents to PST preop for Cystoscopy, Laser Epilation.   pt reports recurrent UTI's and is currently on Augmentin for treatment. 41 y/o male with h/o bladder exstrophy s/p multiple bladder reconstructions presents to PST preop for Cystoscopy, Laser Epilation.   pt reports recurrent UTI's and is currently on Augmentin for treatment.

## 2022-03-30 NOTE — H&P PST ADULT - GIT GEN HX ROS MEA POS PC
related to Augmentin use. pt says he's been on antibiotics for a month/diarrhea related to Augmentin use. pt says he's been on antibiotics for a months/diarrhea

## 2022-03-30 NOTE — H&P PST ADULT - NSICDXPASTMEDICALHX_GEN_ALL_CORE_FT
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

## 2022-03-30 NOTE — H&P PST ADULT - GENITOURINARY COMMENTS
urethral disorder. see HPI.    pt straight caths via metraphof 4-6x/day preop dx of urethral disorder. see HPI.    pt straight caths via metraphof 4-6x/day

## 2022-04-05 LAB — SARS-COV-2 N GENE NPH QL NAA+PROBE: NOT DETECTED

## 2022-04-06 ENCOUNTER — TRANSCRIPTION ENCOUNTER (OUTPATIENT)
Age: 42
End: 2022-04-06

## 2022-04-06 NOTE — ASU PATIENT PROFILE, ADULT - FALL HARM RISK - UNIVERSAL INTERVENTIONS
Bed in lowest position, wheels locked, appropriate side rails in place/Call bell, personal items and telephone in reach/Instruct patient to call for assistance before getting out of bed or chair/Non-slip footwear when patient is out of bed/[place] to call system/Physically safe environment - no spills, clutter or unnecessary equipment/Purposeful Proactive Rounding/Room/bathroom lighting operational, light cord in reach

## 2022-04-06 NOTE — ASU PATIENT PROFILE, ADULT - NSICDXPASTSURGICALHX_GEN_ALL_CORE_FT
PAST SURGICAL HISTORY:  H/O arthroscopy of left knee    H/O cystoscopy multiple    History of bladder surgery multiple bladder reconstruction surgery [id number]    History of colon resection 1980's    S/P small bowel resection 1999, 2010

## 2022-04-07 ENCOUNTER — APPOINTMENT (OUTPATIENT)
Dept: PEDIATRIC UROLOGY | Facility: HOSPITAL | Age: 42
End: 2022-04-07

## 2022-04-07 ENCOUNTER — OUTPATIENT (OUTPATIENT)
Dept: OUTPATIENT SERVICES | Facility: HOSPITAL | Age: 42
LOS: 1 days | Discharge: HOME | End: 2022-04-07
Payer: COMMERCIAL

## 2022-04-07 ENCOUNTER — TRANSCRIPTION ENCOUNTER (OUTPATIENT)
Age: 42
End: 2022-04-07

## 2022-04-07 VITALS
WEIGHT: 145.06 LBS | HEART RATE: 88 BPM | OXYGEN SATURATION: 100 % | TEMPERATURE: 98 F | DIASTOLIC BLOOD PRESSURE: 75 MMHG | RESPIRATION RATE: 17 BRPM | SYSTOLIC BLOOD PRESSURE: 125 MMHG | HEIGHT: 66.25 IN

## 2022-04-07 VITALS
OXYGEN SATURATION: 100 % | SYSTOLIC BLOOD PRESSURE: 112 MMHG | HEART RATE: 61 BPM | TEMPERATURE: 98 F | DIASTOLIC BLOOD PRESSURE: 66 MMHG | RESPIRATION RATE: 17 BRPM

## 2022-04-07 DIAGNOSIS — Z98.890 OTHER SPECIFIED POSTPROCEDURAL STATES: Chronic | ICD-10-CM

## 2022-04-07 DIAGNOSIS — N36.9 URETHRAL DISORDER, UNSPECIFIED: ICD-10-CM

## 2022-04-07 DIAGNOSIS — Z90.49 ACQUIRED ABSENCE OF OTHER SPECIFIED PARTS OF DIGESTIVE TRACT: Chronic | ICD-10-CM

## 2022-04-07 DIAGNOSIS — R50.9 FEVER, UNSPECIFIED: ICD-10-CM

## 2022-04-07 PROCEDURE — 52214 CYSTOSCOPY AND TREATMENT: CPT

## 2022-04-07 DEVICE — STONE BASKET ZEROTIP NITINOL 4-WIRE 1.9FR 120CM X 12MM
Type: IMPLANTABLE DEVICE | Status: NON-FUNCTIONAL
Removed: 2022-04-07

## 2022-04-07 DEVICE — URETERAL CATH AXXCESS OPEN END 6FR 70CM
Type: IMPLANTABLE DEVICE | Status: NON-FUNCTIONAL
Removed: 2022-04-07

## 2022-04-07 DEVICE — FIBER SLIMLINE EZ SIS 365UM
Type: IMPLANTABLE DEVICE | Status: NON-FUNCTIONAL
Removed: 2022-04-07

## 2022-04-07 DEVICE — GUIDEWIRE SENSOR DUAL-FLEX NITINOL STRAIGHT .038" X 150CM
Type: IMPLANTABLE DEVICE | Status: NON-FUNCTIONAL
Removed: 2022-04-07

## 2022-04-07 DEVICE — STENT URETH FIRLIT KLUG 8FRX30CM
Type: IMPLANTABLE DEVICE | Status: NON-FUNCTIONAL
Removed: 2022-04-07

## 2022-04-07 DEVICE — URETERAL CATH OPEN END 4FR 70CM
Type: IMPLANTABLE DEVICE | Status: NON-FUNCTIONAL
Removed: 2022-04-07

## 2022-04-07 RX ORDER — ONDANSETRON 8 MG/1
4 TABLET, FILM COATED ORAL ONCE
Refills: 0 | Status: DISCONTINUED | OUTPATIENT
Start: 2022-04-07 | End: 2022-04-21

## 2022-04-07 RX ORDER — FENTANYL CITRATE 50 UG/ML
25 INJECTION INTRAVENOUS
Refills: 0 | Status: DISCONTINUED | OUTPATIENT
Start: 2022-04-07 | End: 2022-04-07

## 2022-04-07 RX ADMIN — SODIUM CHLORIDE 3 MILLILITER(S): 9 INJECTION INTRAMUSCULAR; INTRAVENOUS; SUBCUTANEOUS at 14:07

## 2022-04-07 NOTE — BRIEF OPERATIVE NOTE - NSICDXBRIEFPROCEDURE_GEN_ALL_CORE_FT
PROCEDURES:  Epilation 07-Apr-2022 12:18:55 laser epilation of urethral hair Loida Pierson  Cystoscopy 07-Apr-2022 12:19:16  Loida Pierson

## 2022-04-07 NOTE — ASU PREOP CHECKLIST - LATEX ALLERGY
[facility]  NEPHROLOGY PROGRESS NOTE   Patient: Santy Pena  Today's Date: 2/22/2022    YOB: 1956  Admission Date: 2/3/2022    MRN: [id number]  Inpatient LOS: 19    Room: [place] [address]  Hospital Day: Hospital Day: 20        HISTORY AND SUBJECTIVE COMPLAINTS     Overnight events:  • No major changes in the last 24 hours     Current status  • No specific complaints during my encounter  • Blood pressure is elevated with SBP in the range of 150s 160s. HR stable.  • Currently on 8 L O2 via high flow NC. Sat in the low to mid 90s  • UOP is 2.6 L in the last 24 hours and 350 mL since midnight. The pt is about 12.2  L net negative since admission    Reviewed Pertinent Histories: Medical History, Surgical History, Social History, Family History and all data below with other service notes.    ROS: 12-point systems were reviewed and were negative except as above.    ASSESSMENT AND PLAN     Active medical problems  · Acute kidney injury  · Stage IIIB chronic kidney disease  · Hypernatremia: resolved  · Benign essential HTN    Plan  YARY on CKD IIIB  · Etiology of CKD is likely secondary to diabetic nephropathy   · Etiology of YARY is likely pre renal   · Baseline Cr is about 1.7--1.9 mg/dl  · Cr peaked at 4.30 mg/dl and currently stable at 1.2 - 1.4 mg/dl  · Avoid nephrotoxic agents   · Dose medications to current GFR  · Monitor renal function closely     Hypernatremia, resolved  · Treated initially with IVF  · Monitor Na level off fluids     Benign essential HTN  · Continue Amlodipine 10 mg daily.   · Continue Hydralazine and increase dose to 50 mg q8h  · Continue clonidine 0.1 mg q12h  · Monitor closely and adjust as needed for target BP < 140/90    Anemia of CKD   ·  Iron panel is replete as of 2/15/22  · TIBC is low C/w anemia of chronic disease.  · Continue Retacrit 2000 units for goal Hgb 10--11.5 g/dl    Other medical problems: rest of medical problems per the primary team    * The pt is stable from  the renal stand point. Will sign off. Please re-consult if needed.       PHYSICAL EXAMINATION     Vitals  Vital Last Value 24Hour Range   Temperature 98.2 °F (36.8 °C) Temp  Min: 97.8 °F (36.6 °C)  Max: 98.2 °F (36.8 °C)   Pulse 63 Pulse  Min: 63  Max: 78   Respiratory 18 Resp  Min: 16  Max: 20   Blood Pressure (!) 152/69 BP  Min: 146/70  Max: 168/74   ArtBP (!) 154/60 No data recorded   Pulse Oximetry 93 % SpO2  Min: 91 %  Max: 97 %      Lab Results   Component Value Date    APH 7.36 02/08/2022    APCO2 39 02/08/2022    APO2 59 (L) 02/08/2022    AHCO3 21 (L) 02/08/2022    FIO2 60 02/05/2022     Vital Today Admitted   Weight (!) 145.4 kg (320 lb 9.6 oz) Weight: (!) 144.4 kg (318 lb 5.5 oz)   Height N/A Height: 5' 10\" (177.8 cm)   BMI N/A BMI (Calculated): 45.68     Intake / Output:  Intake/Output: No intake/output data recorded.   I/O last 3 completed shifts:  In: 1614 [P.O.:1614]  Out: 2450 [Urine:2450]    Intake/Output Summary (Last 24 hours) at 2/22/2022 0835  Last data filed at 2/22/2022 0629  Gross per 24 hour   Intake 1614 ml   Output 2450 ml   Net -836 ml        Physical Exam  General: NAD  HEENT: Head atraumatic, normocephalic.   Neck:  Supple, No JVD. No lymphadenopathy.  No thyroid enlargement  CVS: S1,S2 well heard. There is no S3 or S4 gallop.   Chest/Lungs: Bilateral breath sounds present. No wheeze. No rhonchi, and no rales  Abdomen: Soft, non tender, no hepatosplenomegaly. BS present.  Neurological: AAO x 3. No focal neurological deficit.   Skin: No rash, warm, intact.  Extremities: No edema.     Medications/Infusions: Medications reviewed    TEST RESULTS     Recent Labs   Lab 02/22/22  0654 02/21/22  0538 02/20/22  0452 02/19/22  0520 02/18/22  0413 02/17/22  0339 02/16/22  0409 02/15/22  1454   GLUCOSE 154* 119* 84 91 112* 121* 136*  --    SODIUM 144 146* 145 146* 147* 146* 147* 147*   POTASSIUM 4.7 4.6 4.4 4.5 4.3 4.3 4.0  --    CHLORIDE 116* 117* 119* 120* 120* 119* 118*  --    CO2 26 24 24 23 22  23 25  --    BUN 30* 36* 42* 50* 62* 74* 83*  --    CREATININE 1.27* 1.30* 1.36* 1.29* 1.47* 1.66* 1.63*  --    ANIONGAP 7* 10 6* 8* 9* 8* 8*  --    MG 1.9 1.9 2.0 2.1 2.2 2.2 2.3  --    CALCIUM 9.0 8.7 8.4 8.4 8.3* 7.6* 8.0*  --    PHOS 3.5 3.3 2.7 2.6 2.8 3.6 4.1  --       Recent Labs   Lab 02/22/22  0654 02/21/22  0538 02/20/22  0452 02/19/22  0520 02/18/22  0413 02/17/22  0339 02/16/22  0409   CALCIUM 9.0 8.7 8.4 8.4 8.3* 7.6* 8.0*     Recent Labs   Lab 02/22/22  0654 02/21/22  0538 02/20/22  0452 02/19/22  0520 02/18/22  0413 02/17/22  0339 02/16/22  0409   WBC 4.7 4.6 4.9 7.4 7.3 8.0 10.1   HGB 8.7* 8.6* 8.2* 8.8* 8.4* 8.6* 8.7*   HCT 29.5* 29.5* 28.3* 29.0* 28.8* 28.6* 30.3*    231 248 283 267 317 284     No results found  Iron Panel  No results found    Urinalysis  No results found      Microbiology:  No results found. However, due to the size of the patient record, not all encounters were searched. Please check Results Review for a complete set of results.      Time spent approximately greater than 35 minutes    Thank you for the consult.  Janett Alamo MD, MPH  Please feel free to contact me via secure chat have any questions     OR 15 notified/yes

## 2022-04-07 NOTE — CONSULT LETTER
[FreeTextEntry1] : Dr. Clemons,\par \par I had the pleasure of seeing RODRIGO MIR. Please see my note below. Briefly, His urethra hair is located on the dorsum of his proximal aspect of the penile urethra. I suspect while tubularizing either the bladder or paraexstrophic flaps, non-glabrous skin was incorporated into his urethra. The area was depilated and the base was fulgurated with a laser. He will remove his urethral stent at home in a few days and I will see him in a month or sooner if needed.\par \par Thank you for allowing me to participate in the care of this patient. Please feel free to contact me with any questions\par \par Tomasz Steiner MD\par [facility]\par Pediatric Urology\par [facility]

## 2022-04-07 NOTE — PROCEDURE
[FreeTextEntry1] : Urethral hair [FreeTextEntry2] : Urethral hair [FreeTextEntry3] : Cystoscopy, laser depilation [FreeTextEntry4] : Hair located in the dorsum of the proximal aspect of the penile urethra [FreeTextEntry5] : None [FreeTextEntry6] : Resume CIC immediate\par Urethral stent removed in 5 days

## 2022-04-07 NOTE — ASU DISCHARGE PLAN (ADULT/PEDIATRIC) - ASU DC SPECIAL INSTRUCTIONSFT
Remove urethral catheter in 1 week by cutting the strings on both sides and pulling catheter out. Catheter is short. Call the office with any questions regarding catheter removal or any other concerns.  Resume all home medications.  Resume regular diet and activity.  Resume CIC per your usual routine.  Call the office to schedule a follow up appointment with Dr. Steiner.

## 2022-04-07 NOTE — ASU DISCHARGE PLAN (ADULT/PEDIATRIC) - CARE PROVIDER_API CALL
Tomasz Steiner)  Pediatrics Urology  [phone] [place] [address] [address] [place] [address]  Phone: [phone]  Fax: [phone]  Follow Up Time:

## 2022-04-07 NOTE — ASU PREOP CHECKLIST - AICD PRESENT
----- Message from Sendy Iyer RN sent at 12/7/2021  8:40 AM CST -----  Stable lipid panel, continue current medication regimen.   no

## 2022-04-07 NOTE — ASU DISCHARGE PLAN (ADULT/PEDIATRIC) - NURSING INSTRUCTIONS
You received IV Toradol for pain management at 12:15. Please DO NOT take Motrin/Ibuprofen/Advil/Aleve/NSAIDs (Non-Steroidal Anti-Inflammatory Drugs) for the next 6 hours (until 6:15 PM).     DO NOT take any Tylenol (Acetaminophen) or narcotics containing Tylenol until after 5:50PM . You received Tylenol during your operation and it can cause damage to your liver if too much is taken within a 24 hour time period.

## 2022-04-09 ENCOUNTER — INPATIENT (INPATIENT)
Facility: HOSPITAL | Age: 42
LOS: 2 days | Discharge: HOME | End: 2022-04-12
Attending: UROLOGY | Admitting: UROLOGY
Payer: COMMERCIAL

## 2022-04-09 VITALS
HEART RATE: 133 BPM | SYSTOLIC BLOOD PRESSURE: 122 MMHG | HEIGHT: 66.25 IN | WEIGHT: 139.99 LBS | DIASTOLIC BLOOD PRESSURE: 9 MMHG | OXYGEN SATURATION: 99 % | RESPIRATION RATE: 20 BRPM | TEMPERATURE: 101 F

## 2022-04-09 DIAGNOSIS — Z98.890 OTHER SPECIFIED POSTPROCEDURAL STATES: Chronic | ICD-10-CM

## 2022-04-09 DIAGNOSIS — Z90.49 ACQUIRED ABSENCE OF OTHER SPECIFIED PARTS OF DIGESTIVE TRACT: Chronic | ICD-10-CM

## 2022-04-09 PROBLEM — R50.9 FEVER: Status: ACTIVE | Noted: 2022-04-09

## 2022-04-09 LAB
ALBUMIN SERPL ELPH-MCNC: 4.8 G/DL — SIGNIFICANT CHANGE UP (ref 3.5–5.2)
ALP SERPL-CCNC: 113 U/L — SIGNIFICANT CHANGE UP (ref 30–115)
ALT FLD-CCNC: 16 U/L — SIGNIFICANT CHANGE UP (ref 0–41)
ANION GAP SERPL CALC-SCNC: 12 MMOL/L — SIGNIFICANT CHANGE UP (ref 7–14)
APPEARANCE UR: CLEAR — SIGNIFICANT CHANGE UP
APTT BLD: 31.1 SEC — SIGNIFICANT CHANGE UP (ref 27–39.2)
AST SERPL-CCNC: 18 U/L — SIGNIFICANT CHANGE UP (ref 0–41)
BASOPHILS # BLD AUTO: 0.02 K/UL — SIGNIFICANT CHANGE UP (ref 0–0.2)
BASOPHILS NFR BLD AUTO: 0.2 % — SIGNIFICANT CHANGE UP (ref 0–1)
BILIRUB SERPL-MCNC: 0.2 MG/DL — SIGNIFICANT CHANGE UP (ref 0.2–1.2)
BILIRUB UR-MCNC: NEGATIVE — SIGNIFICANT CHANGE UP
BUN SERPL-MCNC: 16 MG/DL — SIGNIFICANT CHANGE UP (ref 10–20)
CALCIUM SERPL-MCNC: 10 MG/DL — SIGNIFICANT CHANGE UP (ref 8.5–10.1)
CHLORIDE SERPL-SCNC: 103 MMOL/L — SIGNIFICANT CHANGE UP (ref 98–110)
CO2 SERPL-SCNC: 25 MMOL/L — SIGNIFICANT CHANGE UP (ref 17–32)
COLOR SPEC: YELLOW — SIGNIFICANT CHANGE UP
CREAT SERPL-MCNC: 1.2 MG/DL — SIGNIFICANT CHANGE UP (ref 0.7–1.5)
DIFF PNL FLD: NEGATIVE — SIGNIFICANT CHANGE UP
EGFR: 78 ML/MIN/1.73M2 — SIGNIFICANT CHANGE UP
EOSINOPHIL # BLD AUTO: 0 K/UL — SIGNIFICANT CHANGE UP (ref 0–0.7)
EOSINOPHIL NFR BLD AUTO: 0 % — SIGNIFICANT CHANGE UP (ref 0–8)
GLUCOSE SERPL-MCNC: 112 MG/DL — HIGH (ref 70–99)
GLUCOSE UR QL: NEGATIVE MG/DL — SIGNIFICANT CHANGE UP
HCT VFR BLD CALC: 41.3 % — LOW (ref 42–52)
HGB BLD-MCNC: 13.2 G/DL — LOW (ref 14–18)
IMM GRANULOCYTES NFR BLD AUTO: 0.3 % — SIGNIFICANT CHANGE UP (ref 0.1–0.3)
INR BLD: 1 RATIO — SIGNIFICANT CHANGE UP (ref 0.65–1.3)
KETONES UR-MCNC: NEGATIVE — SIGNIFICANT CHANGE UP
LACTATE SERPL-SCNC: 2.3 MMOL/L — HIGH (ref 0.7–2)
LEUKOCYTE ESTERASE UR-ACNC: NEGATIVE — SIGNIFICANT CHANGE UP
LYMPHOCYTES # BLD AUTO: 0.5 K/UL — LOW (ref 1.2–3.4)
LYMPHOCYTES # BLD AUTO: 5.6 % — LOW (ref 20.5–51.1)
MCHC RBC-ENTMCNC: 25.4 PG — LOW (ref 27–31)
MCHC RBC-ENTMCNC: 32 G/DL — SIGNIFICANT CHANGE UP (ref 32–37)
MCV RBC AUTO: 79.4 FL — LOW (ref 80–94)
MONOCYTES # BLD AUTO: 0.44 K/UL — SIGNIFICANT CHANGE UP (ref 0.1–0.6)
MONOCYTES NFR BLD AUTO: 4.9 % — SIGNIFICANT CHANGE UP (ref 1.7–9.3)
NEUTROPHILS # BLD AUTO: 7.97 K/UL — HIGH (ref 1.4–6.5)
NEUTROPHILS NFR BLD AUTO: 89 % — HIGH (ref 42.2–75.2)
NITRITE UR-MCNC: NEGATIVE — SIGNIFICANT CHANGE UP
NRBC # BLD: 0 /100 WBCS — SIGNIFICANT CHANGE UP (ref 0–0)
PH UR: 7 — SIGNIFICANT CHANGE UP (ref 5–8)
PLATELET # BLD AUTO: 311 K/UL — SIGNIFICANT CHANGE UP (ref 130–400)
POTASSIUM SERPL-MCNC: 4.6 MMOL/L — SIGNIFICANT CHANGE UP (ref 3.5–5)
POTASSIUM SERPL-SCNC: 4.6 MMOL/L — SIGNIFICANT CHANGE UP (ref 3.5–5)
PROT SERPL-MCNC: 7.1 G/DL — SIGNIFICANT CHANGE UP (ref 6–8)
PROT UR-MCNC: NEGATIVE MG/DL — SIGNIFICANT CHANGE UP
PROTHROM AB SERPL-ACNC: 11.5 SEC — SIGNIFICANT CHANGE UP (ref 9.95–12.87)
RBC # BLD: 5.2 M/UL — SIGNIFICANT CHANGE UP (ref 4.7–6.1)
RBC # FLD: 13.5 % — SIGNIFICANT CHANGE UP (ref 11.5–14.5)
SARS-COV-2 RNA SPEC QL NAA+PROBE: SIGNIFICANT CHANGE UP
SODIUM SERPL-SCNC: 140 MMOL/L — SIGNIFICANT CHANGE UP (ref 135–146)
SP GR SPEC: 1.02 — SIGNIFICANT CHANGE UP (ref 1.01–1.03)
TROPONIN T SERPL-MCNC: <0.01 NG/ML — SIGNIFICANT CHANGE UP
UROBILINOGEN FLD QL: 0.2 MG/DL — SIGNIFICANT CHANGE UP
WBC # BLD: 8.96 K/UL — SIGNIFICANT CHANGE UP (ref 4.8–10.8)
WBC # FLD AUTO: 8.96 K/UL — SIGNIFICANT CHANGE UP (ref 4.8–10.8)

## 2022-04-09 PROCEDURE — 99285 EMERGENCY DEPT VISIT HI MDM: CPT

## 2022-04-09 PROCEDURE — 71045 X-RAY EXAM CHEST 1 VIEW: CPT | Mod: 26

## 2022-04-09 PROCEDURE — 93010 ELECTROCARDIOGRAM REPORT: CPT

## 2022-04-09 PROCEDURE — 74177 CT ABD & PELVIS W/CONTRAST: CPT | Mod: 26,MA

## 2022-04-09 RX ORDER — ONDANSETRON 8 MG/1
4 TABLET, FILM COATED ORAL ONCE
Refills: 0 | Status: COMPLETED | OUTPATIENT
Start: 2022-04-09 | End: 2022-04-09

## 2022-04-09 RX ORDER — SODIUM CHLORIDE 9 MG/ML
1000 INJECTION, SOLUTION INTRAVENOUS ONCE
Refills: 0 | Status: COMPLETED | OUTPATIENT
Start: 2022-04-09 | End: 2022-04-09

## 2022-04-09 RX ORDER — MORPHINE SULFATE 50 MG/1
4 CAPSULE, EXTENDED RELEASE ORAL ONCE
Refills: 0 | Status: DISCONTINUED | OUTPATIENT
Start: 2022-04-09 | End: 2022-04-09

## 2022-04-09 RX ORDER — ACETAMINOPHEN 500 MG
975 TABLET ORAL ONCE
Refills: 0 | Status: COMPLETED | OUTPATIENT
Start: 2022-04-09 | End: 2022-04-09

## 2022-04-09 RX ORDER — SODIUM CHLORIDE 9 MG/ML
2000 INJECTION, SOLUTION INTRAVENOUS ONCE
Refills: 0 | Status: COMPLETED | OUTPATIENT
Start: 2022-04-09 | End: 2022-04-09

## 2022-04-09 RX ORDER — MEROPENEM 1 G/30ML
1000 INJECTION INTRAVENOUS ONCE
Refills: 0 | Status: COMPLETED | OUTPATIENT
Start: 2022-04-09 | End: 2022-04-09

## 2022-04-09 RX ADMIN — SODIUM CHLORIDE 1000 MILLILITER(S): 9 INJECTION, SOLUTION INTRAVENOUS at 23:32

## 2022-04-09 RX ADMIN — Medication 975 MILLIGRAM(S): at 21:32

## 2022-04-09 RX ADMIN — MEROPENEM 100 MILLIGRAM(S): 1 INJECTION INTRAVENOUS at 21:49

## 2022-04-09 RX ADMIN — MORPHINE SULFATE 4 MILLIGRAM(S): 50 CAPSULE, EXTENDED RELEASE ORAL at 22:03

## 2022-04-09 RX ADMIN — MORPHINE SULFATE 4 MILLIGRAM(S): 50 CAPSULE, EXTENDED RELEASE ORAL at 23:12

## 2022-04-09 RX ADMIN — SODIUM CHLORIDE 2000 MILLILITER(S): 9 INJECTION, SOLUTION INTRAVENOUS at 21:33

## 2022-04-09 NOTE — ED PROVIDER NOTE - CLINICAL SUMMARY MEDICAL DECISION MAKING FREE TEXT BOX
Labs unremarkable.  UA negative.  Covid swab negative.  EKG sinus tach no acute changes. Chest x-ray no acute disease.  CT abdomen pending.  Given IV fluids and meropenem.  Will admit to urology. Labs unremarkable.  UA negative.  Covid swab negative.  EKG sinus tach no acute changes. Chest x-ray no acute disease.  CT abdomen no acute pathology.  Given IV fluids and meropenem.  Will admit to urology.

## 2022-04-09 NOTE — ED ADULT NURSE REASSESSMENT NOTE - NS ED NURSE REASSESS COMMENT FT1
Pt returned from CT, placed back on cardia monitor with sinus tachycardia noted. Pt medicated for pain as per orders.

## 2022-04-09 NOTE — ED PROVIDER NOTE - NS ED ATTENDING STATEMENT MOD
This was a shared visit with the SUSANNAH. I reviewed and verified the documentation and independently performed the documented:

## 2022-04-09 NOTE — ED PROVIDER NOTE - OBJECTIVE STATEMENT
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 Steiner presents to ED for fever and chills that started tonight.  Patient has been on p.o. Augmentin x1 month also has history of hypertension and recurrent multidrug-resistant urinary tract infections.  Patient is complaining of suprapubic pain, mild, aching, no radiation.  Patient self caths.  Denies chest pain shortness of breath nausea vomiting diarrhea.

## 2022-04-09 NOTE — ED PROVIDER NOTE - NS ED ROS FT
Constitutional: (+) fever, (+) chills  Eyes: (-) visual changes  ENT: (-) nasal congestions  Cardiovascular: (-) chest pain, (-) syncope  Respiratory: (-) cough, (-) shortness of breath, (-) dyspnea,   Gastrointestinal: (-) vomiting, (-) diarrhea, (-)nausea,  Musculoskeletal: (-) neck pain, (-) back pain, (-) joint pain,  Integumentary: (-) rash, (-) edema, (-) bruises  Neurological: (-) headache, (-) loc, (-) dizziness, (-) tingling, (-)numbness,  Peripheral Vascular: (-) leg swelling  :  (-)dysuria,  (-) hematuria  Allergic/Immunologic: (-) pruritus

## 2022-04-09 NOTE — ED PROVIDER NOTE - ATTENDING CONTRIBUTION TO CARE
I personally evaluated the patient. I reviewed the Resident´s or Physician Assistant´s note (as assigned above), and agree with the findings and plan except as documented in my note.  41-year-old male, history of bladder exstrophy, with multiple surgeries, recent cystoscopy with laser epilation of urethral hair, was told to come to ED by urologist for shaking chills and fever.  Exam shows alert patient in no distress, HEENT NCAT PERRL, neck supple, lungs clear, tachycardic S1S2, abdomen soft multiple surgical scars +BS, no CCE, neuro A&OX3 GCS 15 no deficits.

## 2022-04-09 NOTE — ED PROVIDER NOTE - PHYSICAL EXAMINATION
Physical Exam    Vital Signs: I have reviewed the initial vital signs.  Constitutional: well-nourished, appears stated age, no acute distress  Eyes: Conjunctiva pink, Sclera clear,   Cardiovascular: S1 and S2, regular rate, regular rhythm, well-perfused extremities, radial pulses equal and 2+  Respiratory: unlabored respiratory effort, clear to auscultation bilaterally no wheezing, rales and rhonchi  Gastrointestinal: soft, + suprpaubic ttp, no pulsatile mass, normal bowl sounds  Musculoskeletal: supple neck, no lower extremity edema, no midline tenderness  Integumentary: warm, dry, no rash  Neurologic: awake, alert, nvi

## 2022-04-10 PROBLEM — K21.9 GASTRO-ESOPHAGEAL REFLUX DISEASE WITHOUT ESOPHAGITIS: Chronic | Status: ACTIVE | Noted: 2022-03-30

## 2022-04-10 PROBLEM — N39.0 URINARY TRACT INFECTION, SITE NOT SPECIFIED: Chronic | Status: ACTIVE | Noted: 2022-03-30

## 2022-04-10 PROBLEM — Z87.438 PERSONAL HISTORY OF OTHER DISEASES OF MALE GENITAL ORGANS: Chronic | Status: ACTIVE | Noted: 2022-03-30

## 2022-04-10 LAB
ALBUMIN SERPL ELPH-MCNC: 3.7 G/DL — SIGNIFICANT CHANGE UP (ref 3.5–5.2)
ALP SERPL-CCNC: 91 U/L — SIGNIFICANT CHANGE UP (ref 30–115)
ALT FLD-CCNC: 15 U/L — SIGNIFICANT CHANGE UP (ref 0–41)
ANION GAP SERPL CALC-SCNC: 8 MMOL/L — SIGNIFICANT CHANGE UP (ref 7–14)
AST SERPL-CCNC: 17 U/L — SIGNIFICANT CHANGE UP (ref 0–41)
BILIRUB SERPL-MCNC: 0.5 MG/DL — SIGNIFICANT CHANGE UP (ref 0.2–1.2)
BUN SERPL-MCNC: 12 MG/DL — SIGNIFICANT CHANGE UP (ref 10–20)
CALCIUM SERPL-MCNC: 8.9 MG/DL — SIGNIFICANT CHANGE UP (ref 8.5–10.1)
CHLORIDE SERPL-SCNC: 105 MMOL/L — SIGNIFICANT CHANGE UP (ref 98–110)
CO2 SERPL-SCNC: 27 MMOL/L — SIGNIFICANT CHANGE UP (ref 17–32)
CREAT SERPL-MCNC: 1.2 MG/DL — SIGNIFICANT CHANGE UP (ref 0.7–1.5)
EGFR: 78 ML/MIN/1.73M2 — SIGNIFICANT CHANGE UP
GLUCOSE SERPL-MCNC: 123 MG/DL — HIGH (ref 70–99)
HCT VFR BLD CALC: 36.7 % — LOW (ref 42–52)
HGB BLD-MCNC: 11.7 G/DL — LOW (ref 14–18)
LACTATE SERPL-SCNC: 1.4 MMOL/L — SIGNIFICANT CHANGE UP (ref 0.7–2)
MCHC RBC-ENTMCNC: 25.7 PG — LOW (ref 27–31)
MCHC RBC-ENTMCNC: 31.9 G/DL — LOW (ref 32–37)
MCV RBC AUTO: 80.5 FL — SIGNIFICANT CHANGE UP (ref 80–94)
NRBC # BLD: 0 /100 WBCS — SIGNIFICANT CHANGE UP (ref 0–0)
PLATELET # BLD AUTO: 229 K/UL — SIGNIFICANT CHANGE UP (ref 130–400)
POTASSIUM SERPL-MCNC: 4 MMOL/L — SIGNIFICANT CHANGE UP (ref 3.5–5)
POTASSIUM SERPL-SCNC: 4 MMOL/L — SIGNIFICANT CHANGE UP (ref 3.5–5)
PROT SERPL-MCNC: 5.6 G/DL — LOW (ref 6–8)
RBC # BLD: 4.56 M/UL — LOW (ref 4.7–6.1)
RBC # FLD: 13.6 % — SIGNIFICANT CHANGE UP (ref 11.5–14.5)
SODIUM SERPL-SCNC: 140 MMOL/L — SIGNIFICANT CHANGE UP (ref 135–146)
WBC # BLD: 6.81 K/UL — SIGNIFICANT CHANGE UP (ref 4.8–10.8)
WBC # FLD AUTO: 6.81 K/UL — SIGNIFICANT CHANGE UP (ref 4.8–10.8)

## 2022-04-10 PROCEDURE — 71045 X-RAY EXAM CHEST 1 VIEW: CPT | Mod: 26

## 2022-04-10 RX ORDER — MEROPENEM 1 G/30ML
1000 INJECTION INTRAVENOUS EVERY 8 HOURS
Refills: 0 | Status: DISCONTINUED | OUTPATIENT
Start: 2022-04-10 | End: 2022-04-12

## 2022-04-10 RX ORDER — MORPHINE SULFATE 50 MG/1
2 CAPSULE, EXTENDED RELEASE ORAL EVERY 4 HOURS
Refills: 0 | Status: DISCONTINUED | OUTPATIENT
Start: 2022-04-10 | End: 2022-04-12

## 2022-04-10 RX ORDER — VALSARTAN 80 MG/1
80 TABLET ORAL DAILY
Refills: 0 | Status: DISCONTINUED | OUTPATIENT
Start: 2022-04-10 | End: 2022-04-12

## 2022-04-10 RX ORDER — ACETAMINOPHEN 500 MG
650 TABLET ORAL EVERY 6 HOURS
Refills: 0 | Status: DISCONTINUED | OUTPATIENT
Start: 2022-04-10 | End: 2022-04-11

## 2022-04-10 RX ORDER — ONDANSETRON 8 MG/1
4 TABLET, FILM COATED ORAL EVERY 6 HOURS
Refills: 0 | Status: DISCONTINUED | OUTPATIENT
Start: 2022-04-10 | End: 2022-04-12

## 2022-04-10 RX ORDER — IBUPROFEN 200 MG
400 TABLET ORAL EVERY 8 HOURS
Refills: 0 | Status: DISCONTINUED | OUTPATIENT
Start: 2022-04-10 | End: 2022-04-12

## 2022-04-10 RX ORDER — TADALAFIL 10 MG/1
1 TABLET, FILM COATED ORAL
Qty: 0 | Refills: 0 | DISCHARGE

## 2022-04-10 RX ORDER — SODIUM CHLORIDE 9 MG/ML
1000 INJECTION, SOLUTION INTRAVENOUS
Refills: 0 | Status: DISCONTINUED | OUTPATIENT
Start: 2022-04-10 | End: 2022-04-12

## 2022-04-10 RX ORDER — SODIUM CHLORIDE 9 MG/ML
500 INJECTION INTRAMUSCULAR; INTRAVENOUS; SUBCUTANEOUS ONCE
Refills: 0 | Status: COMPLETED | OUTPATIENT
Start: 2022-04-10 | End: 2022-04-10

## 2022-04-10 RX ORDER — IBUPROFEN 200 MG
1 TABLET ORAL
Qty: 0 | Refills: 0 | DISCHARGE

## 2022-04-10 RX ORDER — PANTOPRAZOLE SODIUM 20 MG/1
40 TABLET, DELAYED RELEASE ORAL
Refills: 0 | Status: DISCONTINUED | OUTPATIENT
Start: 2022-04-10 | End: 2022-04-12

## 2022-04-10 RX ORDER — SIMETHICONE 80 MG/1
80 TABLET, CHEWABLE ORAL ONCE
Refills: 0 | Status: COMPLETED | OUTPATIENT
Start: 2022-04-10 | End: 2022-04-10

## 2022-04-10 RX ADMIN — SODIUM CHLORIDE 100 MILLILITER(S): 9 INJECTION, SOLUTION INTRAVENOUS at 01:50

## 2022-04-10 RX ADMIN — MORPHINE SULFATE 2 MILLIGRAM(S): 50 CAPSULE, EXTENDED RELEASE ORAL at 02:32

## 2022-04-10 RX ADMIN — MORPHINE SULFATE 2 MILLIGRAM(S): 50 CAPSULE, EXTENDED RELEASE ORAL at 10:14

## 2022-04-10 RX ADMIN — MEROPENEM 100 MILLIGRAM(S): 1 INJECTION INTRAVENOUS at 05:51

## 2022-04-10 RX ADMIN — MORPHINE SULFATE 2 MILLIGRAM(S): 50 CAPSULE, EXTENDED RELEASE ORAL at 01:26

## 2022-04-10 RX ADMIN — MEROPENEM 100 MILLIGRAM(S): 1 INJECTION INTRAVENOUS at 13:37

## 2022-04-10 RX ADMIN — MEROPENEM 100 MILLIGRAM(S): 1 INJECTION INTRAVENOUS at 21:44

## 2022-04-10 RX ADMIN — Medication 400 MILLIGRAM(S): at 19:59

## 2022-04-10 RX ADMIN — MORPHINE SULFATE 2 MILLIGRAM(S): 50 CAPSULE, EXTENDED RELEASE ORAL at 20:20

## 2022-04-10 RX ADMIN — MORPHINE SULFATE 2 MILLIGRAM(S): 50 CAPSULE, EXTENDED RELEASE ORAL at 11:16

## 2022-04-10 RX ADMIN — SODIUM CHLORIDE 1000 MILLILITER(S): 9 INJECTION INTRAMUSCULAR; INTRAVENOUS; SUBCUTANEOUS at 09:00

## 2022-04-10 RX ADMIN — MORPHINE SULFATE 2 MILLIGRAM(S): 50 CAPSULE, EXTENDED RELEASE ORAL at 15:29

## 2022-04-10 RX ADMIN — Medication 650 MILLIGRAM(S): at 18:57

## 2022-04-10 RX ADMIN — Medication 400 MILLIGRAM(S): at 01:25

## 2022-04-10 RX ADMIN — MORPHINE SULFATE 2 MILLIGRAM(S): 50 CAPSULE, EXTENDED RELEASE ORAL at 06:50

## 2022-04-10 RX ADMIN — SODIUM CHLORIDE 100 MILLILITER(S): 9 INJECTION, SOLUTION INTRAVENOUS at 21:44

## 2022-04-10 RX ADMIN — SIMETHICONE 80 MILLIGRAM(S): 80 TABLET, CHEWABLE ORAL at 14:45

## 2022-04-10 RX ADMIN — PANTOPRAZOLE SODIUM 40 MILLIGRAM(S): 20 TABLET, DELAYED RELEASE ORAL at 05:52

## 2022-04-10 RX ADMIN — MORPHINE SULFATE 2 MILLIGRAM(S): 50 CAPSULE, EXTENDED RELEASE ORAL at 19:48

## 2022-04-10 RX ADMIN — SODIUM CHLORIDE 100 MILLILITER(S): 9 INJECTION, SOLUTION INTRAVENOUS at 10:00

## 2022-04-10 RX ADMIN — Medication 400 MILLIGRAM(S): at 20:20

## 2022-04-10 RX ADMIN — MORPHINE SULFATE 2 MILLIGRAM(S): 50 CAPSULE, EXTENDED RELEASE ORAL at 15:44

## 2022-04-10 RX ADMIN — ONDANSETRON 4 MILLIGRAM(S): 8 TABLET, FILM COATED ORAL at 05:58

## 2022-04-10 RX ADMIN — Medication 650 MILLIGRAM(S): at 05:51

## 2022-04-10 RX ADMIN — Medication 400 MILLIGRAM(S): at 02:32

## 2022-04-10 RX ADMIN — Medication 400 MILLIGRAM(S): at 09:59

## 2022-04-10 NOTE — H&P ADULT - ASSESSMENT
PROSTATITIS/  FEVER  IVF-- D5-1/2 NS @100  PAIN MGMT--MORPHINE  IV ABX-MEROPENEM    HTN ON VALSARTAN    DVT/ GI PROPHYLAXIS    WILL CONTINUE TO FOLLOW

## 2022-04-10 NOTE — PROGRESS NOTE ADULT - ASSESSMENT
This is a 42-year-old male with a history of HTN, bladder exstrophy, s/p multiple bladder reconstructions, CIC via abd stoma - now s/p stomal cystoscopy and laser epilation of urethral hair on 4/7/22 by Dr. Steiner.  He now presents with malaise and fever.  Rahul cough/SOB/dyspnea/abdominal/flank pain.    #Fever/sepsis - etiology? ?peylo ?viral  #R/O UTI   - CT A/P negative  - continue meropenem for now; F/U   - F/U urine/blood cultures  - will give 500 cc NS bolus this AM and continue maintenance IV fluids  - continue pain meds   - f/u am labs    #HTN  - continue valsartan    #GI prophylaxis

## 2022-04-10 NOTE — PATIENT PROFILE ADULT - FALL HARM RISK - RISK INTERVENTIONS
Assistance OOB with selected safe patient handling equipment/Assistance with ambulation/Communicate Fall Risk and Risk Factors to all staff, patient, and family/Discuss with provider need for PT consult/Monitor gait and stability/Reinforce activity limits and safety measures with patient and family/Visual Cue: Yellow wristband/Bed in lowest position, wheels locked, appropriate side rails in place/Call bell, personal items and telephone in reach/Instruct patient to call for assistance before getting out of bed or chair/Non-slip footwear when patient is out of bed/[place] to call system/Physically safe environment - no spills, clutter or unnecessary equipment/Purposeful Proactive Rounding/Room/bathroom lighting operational, light cord in reach

## 2022-04-10 NOTE — PATIENT PROFILE ADULT - NSPROPTRIGHTSUPPORTPERSON_GEN_A_NUR
Using [place] , discussed safe sleep, infant fall prevention, use of bulb syringe, birth certificate, need to pick ped and make appointment (list given), infant feeding, use of feeding log, diaper care (including yellow line), and hep B vaccine (mother's consent received). Parents verbalized understanding of all above info.
same name as above

## 2022-04-10 NOTE — CONSULT NOTE ADULT - SUBJECTIVE AND OBJECTIVE BOX
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    Eczema    HTN (hypertension)    GERD (gastroesophageal reflux disease)    Recurrent urinary tract infection    H/O prostatitis    History of bladder surgery  multiple bladder reconstruction surgery [id number]    History of colon resection  &#x27;s    S/P small bowel resection  ,     H/O arthroscopy  of left knee    H/O cystoscopy  multiple        REVIEW OF SYSTEMS: Total of twelve systems have been reviewed with patient and found to be negative unless mentioned in HPI      SOCIAL HISTORY  Alcohol: Does not drink  Tobacco: Does not smoke  Illicit substance use: None      FAMILY HISTORY: Non contributory to the present illness        ALLERGIES: Cipro (Other)  latex (Rash)  Levaquin (Other)  No Known Drug Allergies        Vital Signs Last 24 Hrs  T(C): 37.4 (10 Apr 2022 22:21), Max: 38.3 (10 Apr 2022 02:22)  T(F): 99.3 (10 Apr 2022 22:21), Max: 101 (10 Apr 2022 02:22)  HR: 92 (10 Apr 2022 20:49) (70 - 128)  BP: 113/56 (10 Apr 2022 20:49) (99/50 - 132/67)  BP(mean): --  RR: 16 (10 Apr 2022 20:49) (16 - 20)  SpO2: 99% (10 Apr 2022 01:16) (98% - 99%)        PHYSICAL EXAM:  GENERAL: Not in distress   CHEST/LUNG:  Aire ntry bilaterally  HEART: s1 and s2 present  ABDOMEN:  Nontender and  Nondistended  EXTREMITIES: No pedal  edema  CNS: Awake and Alert      LABS:                        11.7   6.81  )-----------( 229      ( 10 Apr 2022 07:45 )             36.7     04-10    140  |  105  |  12  ----------------------------<  123<H>  4.0   |  27  |  1.2    Ca    8.9      10 Apr 2022 07:45    TPro  5.6<L>  /  Alb  3.7  /  TBili  0.5  /  DBili  x   /  AST  17  /  ALT  15  /  AlkPhos  91  04-10    PT/INR - ( 2022 21:44 )   PT: 11.50 sec;   INR: 1.00 ratio         PTT - ( 2022 21:44 )  PTT:31.1 sec          Urinalysis Basic - ( 2022 21:55 )  Color: Yellow / Appearance: Clear / S.020 / pH: x  Gluc: x / Ketone: Negative  / Bili: Negative / Urobili: 0.2 mg/dL   Blood: x / Protein: Negative mg/dL / Nitrite: Negative   Leuk Esterase: Negative / RBC: x / WBC x   Sq Epi: x / Non Sq Epi: x / Bacteria: x        MEDICATIONS  (STANDING):  dextrose 5% + sodium chloride 0.45%. 1000 milliLiter(s) (100 mL/Hr) IV Continuous <Continuous>  meropenem  IVPB 1000 milliGRAM(s) IV Intermittent every 8 hours  pantoprazole    Tablet 40 milliGRAM(s) Oral before breakfast  valsartan 80 milliGRAM(s) Oral daily    MEDICATIONS  (PRN):  acetaminophen     Tablet .. 650 milliGRAM(s) Oral every 6 hours PRN Temp greater or equal to 38C (100.4F)  ibuprofen  Tablet. 400 milliGRAM(s) Oral every 8 hours PRN Mild Pain (1 - 3)  morphine  - Injectable 2 milliGRAM(s) IV Push every 4 hours PRN Severe Pain (7 - 10)  ondansetron Injectable 4 milliGRAM(s) IV Push every 6 hours PRN Nausea and/or Vomiting        RADIOLOGY & ADDITIONAL TESTS:    < from: Xray Chest 1 View AP/PA (04.10.22 @ 15:46) >    No radiographic evidence of acute cardiopulmonary disease.    < end of copied text >  < from: CT Abdomen and Pelvis w/ IV Cont (22 @ 23:03) >  No definite acute pathology identified.    Limited evaluation of patient's neobladder which is decompressed   secondary to external catheter.    Stable degree of caliectasis/hydroureteronephrosis bilaterally to the   level of the neobladder without obstructing calculus. No CT evidence for     < end of copied text >  < from: Xray Chest 1 View-PORTABLE IMMEDIATE (22 @ 21:28) >  Impression:    No radiographic evidence of acute cardiopulmonary disease.      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 penile drain, on  by Dr. Steiner at [facility], now presents to ER for evaluation of fever and chills.  Patient has been on oral Augmentin x1 month also has history of hypertension and recurrent multidrug-resistant urinary tract infections.  Patient is complaining of suprapubic pain.  Patient self caths.  ON admission he has fever but negative CXR and Urine analysis. The CT abd/pelvis shows Stable degree of caliectasis/hydroureteronephrosis bilaterally to the level of the neobladder without obstructing calculus. He has started on Meropenem and the ID consult requested to assist with further evaluation and antibiotic management.       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  Eczema  HTN (hypertension)  GERD (gastroesophageal reflux disease)  Recurrent urinary tract infection  H/O prostatitis  History of bladder surgery  multiple bladder reconstruction surgery [id number]  History of colon resection,&#x27;s  S/P small bowel resection,,   H/O Left knee arthroscopy  H/O multiple cystoscopy        SOCIAL HISTORY  Alcohol: Does not drink  Tobacco: Does not smoke  Illicit substance use: None      FAMILY HISTORY: Non contributory to the present illness      ALLERGIES: Cipro (Other)  latex (Rash)  Levaquin (Other)  No Known Drug Allergies      Vital Signs Last 24 Hrs  T(C): 37.4 (10 Apr 2022 22:21), Max: 38.3 (10 Apr 2022 02:22)  T(F): 99.3 (10 Apr 2022 22:21), Max: 101 (10 Apr 2022 02:22)  HR: 92 (10 Apr 2022 20:49) (70 - 128)  BP: 113/56 (10 Apr 2022 20:49) (99/50 - 132/67)  BP(mean): --  RR: 16 (10 Apr 2022 20:49) (16 - 20)  SpO2: 99% (10 Apr 2022 01:16) (98% - 99%)        PHYSICAL EXAM:  GENERAL: Not in distress   CHEST/LUNG:  Not using accessory muscles   HEART: s1 and s2 present  ABDOMEN: Catheter from reconstructed bladder   EXTREMITIES: No pedal  edema  CNS: Awake and Alert      LABS:                        11.7   6.81  )-----------( 229      ( 10 Apr 2022 07:45 )             36.7     04-10    140  |  105  |  12  ----------------------------<  123<H>  4.0   |  27  |  1.2    Ca    8.9      10 Apr 2022 07:45    TPro  5.6<L>  /  Alb  3.7  /  TBili  0.5  /  DBili  x   /  AST  17  /  ALT  15  /  AlkPhos  91  04-10    PT/INR - ( 2022 21:44 )   PT: 11.50 sec;   INR: 1.00 ratio         PTT - ( 2022 21:44 )  PTT:31.1 sec          Urinalysis Basic - ( 2022 21:55 )  Color: Yellow / Appearance: Clear / S.020 / pH: x  Gluc: x / Ketone: Negative  / Bili: Negative / Urobili: 0.2 mg/dL   Blood: x / Protein: Negative mg/dL / Nitrite: Negative   Leuk Esterase: Negative / RBC: x / WBC x   Sq Epi: x / Non Sq Epi: x / Bacteria: x        MEDICATIONS  (STANDING):  dextrose 5% + sodium chloride 0.45%. 1000 milliLiter(s) (100 mL/Hr) IV Continuous <Continuous>  meropenem  IVPB 1000 milliGRAM(s) IV Intermittent every 8 hours  pantoprazole    Tablet 40 milliGRAM(s) Oral before breakfast  valsartan 80 milliGRAM(s) Oral daily    MEDICATIONS  (PRN):  acetaminophen     Tablet .. 650 milliGRAM(s) Oral every 6 hours PRN Temp greater or equal to 38C (100.4F)  ibuprofen  Tablet. 400 milliGRAM(s) Oral every 8 hours PRN Mild Pain (1 - 3)  morphine  - Injectable 2 milliGRAM(s) IV Push every 4 hours PRN Severe Pain (7 - 10)  ondansetron Injectable 4 milliGRAM(s) IV Push every 6 hours PRN Nausea and/or Vomiting        RADIOLOGY & ADDITIONAL TESTS:    < from: Xray Chest 1 View AP/PA (04.10.22 @ 15:46) >    No radiographic evidence of acute cardiopulmonary disease.      < from: CT Abdomen and Pelvis w/ IV Cont (22 @ 23:03) >  No definite acute pathology identified.    Limited evaluation of patient's neobladder which is decompressed   secondary to external catheter.    Stable degree of caliectasis/hydroureteronephrosis bilaterally to the   level of the neobladder without obstructing calculus. No CT evidence for       < from: Xray Chest 1 View-PORTABLE IMMEDIATE (22 @ 21:28) >  Impression:    No radiographic evidence of acute cardiopulmonary disease.      MICROBIOLOGY DATA:    COVID-19 PCR (22 @ 21:44)   COVID-19 PCR: NotDetec:

## 2022-04-10 NOTE — CONSULT NOTE ADULT - ASSESSMENT
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 penile drain, on April 7 by Dr. Steiner at [facility], now presents to ER for evaluation of fever and chills.  Patient has been on oral Augmentin x1 month also has history of hypertension and recurrent multidrug-resistant urinary tract infections.  Patient is complaining of suprapubic pain.  Patient self caths.  ON admission he has fever but negative CXR and Urine analysis. The CT abd/pelvis shows Stable degree of caliectasis/hydroureteronephrosis bilaterally to the level of the neobladder without obstructing calculus. He has started on Meropenem and the ID consult requested to assist with further evaluation and antibiotic management.     # Post-op fever- s/p  cystoscopy with Laser hair removal and placed a ? penile drain-on 4/7/22 at [facility]  # Bladder exstrophy status post multiple bladder reconstructions  # Chronic Prostatitis- recent Cx grew E.coli and Enterococcus - s/p completed 4 weeks of oral Augmentin   # ALLERGIES: Cipro and Levaquin    would recommend:    1. Follow up Urine and Blood cultures  2. Monitor Temp and c/w supportive care  3. Continue Meropenem until work up is done  4. Agree with removal of ?penile drain and if still spikes fever then please add Linezolid based on previous C & S    d/w Patient     will follow the patient with you and make further recommendation based on the clinical course and Lab results  Thank you for the opportunity to participate in Mr. MIR's care    Attending Attestation:    Spent more than 65 minutes on total encounter, more than 50 % of the visit was spent counseling and/or coordinating care by the Attending physician.

## 2022-04-10 NOTE — H&P ADULT - NSHPLABSRESULTS_GEN_ALL_CORE
13.2   8.96  )-----------( 311      ( 09 Apr 2022 21:44 )             41.3   04-09    140  |  103  |  16  ----------------------------<  112<H>  4.6   |  25  |  1.2    Ca    10.0      09 Apr 2022 21:44    TPro  7.1  /  Alb  4.8  /  TBili  0.2  /  DBili  x   /  AST  18  /  ALT  16  /  AlkPhos  113  04-09 13.2   8.96  )-----------( 311      ( 09 Apr 2022 21:44 )             41.3   04-09    140  |  103  |  16  ----------------------------<  112<H>  4.6   |  25  |  1.2    Ca    10.0      09 Apr 2022 21:44    TPro  7.1  /  Alb  4.8  /  TBili  0.2  /  DBili  x   /  AST  18  /  ALT  16  /  AlkPhos  113  04-09    ******PRELIMINARY REPORT******      ******PRELIMINARY REPORT******       ACC: [id number] EXAM:  CT ABDOMEN AND PELVIS IC                          PROCEDURE DATE:  04/09/2022    ******PRELIMINARY REPORT******      ******PRELIMINARY REPORT******           INTERPRETATION:  CLINICAL STATEMENT: Postoperative fever. Urological   surgery.    TECHNIQUE: Contiguous axial CT images were obtained from the lower chest   to the pubic symphysis after administration of 95 cc Optiray 350 IV   contrast.  Oral contrast was not administered.  Reformatted images in the   coronal and sagittal planes were acquired.    COMPARISON CT: CT abdomen and pelvis dated 10/23/2014. Correlation is   made with Pelvic MR dated 3/4/2022.    FINDINGS:    LOWER CHEST: Atelectaticchanges.    HEPATOBILIARY: No focal liver lesion. The gallbladder is present..    SPLEEN: Unremarkable.    PANCREAS: Unremarkable.    ADRENAL GLANDS: Unremarkable.    KIDNEYS: Symmetric renal enhancement. Bilateral moderate hydronephrosis,   right greater than left which is similar in appearance as compared with   prior. The patient is status post repair of the urinary bladder with   neobladder in the pelvis. The neobladder is relatively collapsed and   cannot be well evaluated. There is unchanged dilation of the bilateral   ureters to the level of the pelvic neobladder which is collapsed and   contains the tip of a suprapubic/right abdominal wall catheter with no   visible colon. An additional intraurethral catheter is seen with tip at   thelevel of the bulbous/prostatic urethra (4-241) without visible   internal balloon.    ABDOMINOPELVIC NODES: No enlarged abdominal or pelvic lymph nodes by size   criteria.    PELVIC ORGANS: As above.    PERITONEUM/MESENTERY/BOWEL: No intraperitoneal free air, ascites, or   bowel obstruction. Status post bowel surgery with redemonstration of   suture material in the midline anterior pelvis. The appendix is not   definitively visualized however there is no pericecal inflammation.    BONES/SOFT TISSUES: No acute osseous abnormality.    OTHER: The abdominal aorta is of normal caliber. Bilateral fat-containing   inguinal hernias.    IMPRESSION:    No definite evidence of intra-abdominal source of infection or other   acute pathology.    Status post urinary bladder appears/neobladder within the pelvis with   similar appearing bilateral moderate hydroureteronephrosis, right greater   than left. A percutaneous right abdominal wall catheter terminates within   the neobladder, and a second intraurethral catheter terminates at the   bulbous/prostatic urethra region without visible inflated balloons.        ******PRELIMINARY REPORT******      ******PRELIMINARY REPORT******       DG RITTER MD; Resident Radiologist

## 2022-04-10 NOTE — H&P ADULT - NSICDXPASTMEDICALHX_GEN_ALL_CORE_FT
PAST MEDICAL HISTORY:  Bladder disease or syndrome congenital bladder exstrophy    Eczema     GERD (gastroesophageal reflux disease)     H/O prostatitis     HTN (hypertension)     Recurrent urinary tract infection

## 2022-04-10 NOTE — PROGRESS NOTE ADULT - SUBJECTIVE AND OBJECTIVE BOX
S: Pt feeling a little better - still c/o malaise; has been having fevers still.  Denies cough, SOB, nasal congestion, abdominal/flank pain.  He has a urethral catheter/stent in place which is causing discomfort.  He performs CIC via abdominal stoma at home but has opted to leave  catheter in place - urine is clear, pale yellow  O; Vital Signs Last 24 Hrs  T(C): 37.1 (10 Apr 2022 08:49), Max: 38.8 (2022 21:50)  T(F): 98.8 (10 Apr 2022 08:49), Max: 101.8 (2022 21:50)  HR: 104 (10 Apr 2022 08:49) (84 - 133)  BP: 132/67 (10 Apr 2022 08:49) (99/78 - 138/84)  BP(mean): --  RR: 18 (10 Apr 2022 08:49) (18 - 20)  SpO2: 99% (10 Apr 2022 01:16) (98% - 100%)    I&O's Detail    2022 07:01  -  10 Apr 2022 07:00  --------------------------------------------------------  IN:  Total IN: 0 mL    OUT:    Voided (mL): 2815 mL  Total OUT: 2815 mL    Total NET: -2815 mL    MEDICATIONS  (STANDING):  dextrose 5% + sodium chloride 0.45%. 1000 milliLiter(s) (100 mL/Hr) IV Continuous <Continuous>  meropenem  IVPB 1000 milliGRAM(s) IV Intermittent every 8 hours  pantoprazole    Tablet 40 milliGRAM(s) Oral before breakfast  sodium chloride 0.9% Bolus 500 milliLiter(s) IV Bolus once  valsartan 80 milliGRAM(s) Oral daily    MEDICATIONS  (PRN):  acetaminophen     Tablet .. 650 milliGRAM(s) Oral every 6 hours PRN Temp greater or equal to 38C (100.4F)  ibuprofen  Tablet. 400 milliGRAM(s) Oral every 8 hours PRN Mild Pain (1 - 3)  morphine  - Injectable 2 milliGRAM(s) IV Push every 4 hours PRN Severe Pain (7 - 10)  ondansetron Injectable 4 milliGRAM(s) IV Push every 6 hours PRN Nausea and/or Vomiting    EXAM:  gen: NAD  lungs: cta  cvs: +tachy  abd: soft, NT/ND; abdominal stoma w/catheter in place, clear yellow urine  :  +urethral stent/catheter in place    Labs:                          11.7   6.81  )-----------( 229      ( 10 Apr 2022 07:45 )             36.7       Auto Neutrophil %: 89.0 % (22 @ 21:44)  Auto Immature Granulocyte %: 0.3 % (22 @ 21:44)     Lactate, Blood: 1.4 mmol/L (04-10-22 @ 00:14)  Lactate, Blood: 2.3 mmol/L (22 @ 21:44)      Coags:     11.50  ----< 1.00    ( 2022 21:44 )     31.1        CARDIAC MARKERS ( 2022 21:44 )  x     / <0.01 ng/mL / x     / x     / x          Urinalysis Basic - ( 2022 21:55 )    Color: Yellow / Appearance: Clear / S.020 / pH: x  Gluc: x / Ketone: Negative  / Bili: Negative / Urobili: 0.2 mg/dL   Blood: x / Protein: Negative mg/dL / Nitrite: Negative   Leuk Esterase: Negative / RBC: x / WBC x   Sq Epi: x / Non Sq Epi: x / Bacteria: x       S: Pt feeling a little better - still c/o malaise; has been having fevers still.  Denies cough, SOB, nasal congestion, abdominal/flank pain.  He has a urethral catheter/stent in place which is causing discomfort.  He performs CIC via abdominal stoma at home but has opted to leave  catheter in place - urine is clear, pale yellow  O; Vital Signs Last 24 Hrs  T(C): 37.1 (10 Apr 2022 08:49), Max: 38.8 (2022 21:50)  T(F): 98.8 (10 Apr 2022 08:49), Max: 101.8 (2022 21:50)  HR: 104 (10 Apr 2022 08:49) (84 - 133)  BP: 132/67 (10 Apr 2022 08:49) (99/78 - 138/84)  BP(mean): --  RR: 18 (10 Apr 2022 08:49) (18 - 20)  SpO2: 99% (10 Apr 2022 01:16) (98% - 100%)    I&O's Detail    2022 07:01  -  10 Apr 2022 07:00  --------------------------------------------------------  IN:  Total IN: 0 mL    OUT:    Voided (mL): 2815 mL  Total OUT: 2815 mL    Total NET: -2815 mL    MEDICATIONS  (STANDING):  dextrose 5% + sodium chloride 0.45%. 1000 milliLiter(s) (100 mL/Hr) IV Continuous <Continuous>  meropenem  IVPB 1000 milliGRAM(s) IV Intermittent every 8 hours  pantoprazole    Tablet 40 milliGRAM(s) Oral before breakfast  sodium chloride 0.9% Bolus 500 milliLiter(s) IV Bolus once  valsartan 80 milliGRAM(s) Oral daily    MEDICATIONS  (PRN):  acetaminophen     Tablet .. 650 milliGRAM(s) Oral every 6 hours PRN Temp greater or equal to 38C (100.4F)  ibuprofen  Tablet. 400 milliGRAM(s) Oral every 8 hours PRN Mild Pain (1 - 3)  morphine  - Injectable 2 milliGRAM(s) IV Push every 4 hours PRN Severe Pain (7 - 10)  ondansetron Injectable 4 milliGRAM(s) IV Push every 6 hours PRN Nausea and/or Vomiting    EXAM:  gen: NAD  lungs: cta  cvs: +tachy  abd: soft, NT/ND; abdominal stoma w/catheter in place, clear yellow urine  :  +urethral stent/catheter in place, suprapubic tube drainage is clear without sediments.  Multiple abdominal incisions well healed with no hernia  No urethral discharge seen at this time    Labs:                          11.7   6.81  )-----------( 229      ( 10 Apr 2022 07:45 )             36.7       Auto Neutrophil %: 89.0 % (22 @ 21:44)  Auto Immature Granulocyte %: 0.3 % (22 @ 21:44)     Lactate, Blood: 1.4 mmol/L (04-10-22 @ 00:14)  Lactate, Blood: 2.3 mmol/L (22 @ 21:44)      Coags:     11.50  ----< 1.00    ( 2022 21:44 )     31.1        CARDIAC MARKERS ( 2022 21:44 )  x     / <0.01 ng/mL / x     / x     / x          Urinalysis Basic - ( 2022 21:55 )    Color: Yellow / Appearance: Clear / S.020 / pH: x  Gluc: x / Ketone: Negative  / Bili: Negative / Urobili: 0.2 mg/dL   Blood: x / Protein: Negative mg/dL / Nitrite: Negative   Leuk Esterase: Negative / RBC: x / WBC x   Sq Epi: x / Non Sq Epi: x / Bacteria: x

## 2022-04-10 NOTE — H&P ADULT - HISTORY OF PRESENT ILLNESS
· Chief Complaint: The patient is a 42y Male complaining of fever.  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 Steiner presents to ED for fever and chills that started tonight.  Patient has been on p.o. Augmentin x1 month also has history of hypertension and recurrent multidrug-resistant urinary tract infections.  Patient is complaining of suprapubic pain, mild, aching, no radiation.  Patient self caths.  Denies chest pain shortness of breath nausea vomiting diarrhea.

## 2022-04-10 NOTE — H&P ADULT - NS PANP COMMENT GEN_ALL_CORE FT
Patient evaluated  PE:    Abdomen soft  :  Multiple surgical incisions in the abdomen but no evidence of hernia  SPT in place with clear drainage    Plan:  Continue abx  ID evaluation  May remove urethral stent in AM

## 2022-04-10 NOTE — H&P ADULT - NSHPPHYSICALEXAM_GEN_ALL_CORE
ICU Vital Signs Last 24 Hrs  T(C): 38.2 (09 Apr 2022 23:05), Max: 38.8 (09 Apr 2022 21:50)  T(F): 100.8 (09 Apr 2022 23:05), Max: 101.8 (09 Apr 2022 21:50)  HR: 104 (09 Apr 2022 23:45) (104 - 133)  BP: 109/55 (09 Apr 2022 23:45) (106/55 - 138/84)  BP(mean): --  ABP: --  ABP(mean): --  RR: 18 (09 Apr 2022 23:45) (18 - 20)  SpO2: 99% (09 Apr 2022 23:45) (99% - 100%)

## 2022-04-11 LAB
ANION GAP SERPL CALC-SCNC: 8 MMOL/L — SIGNIFICANT CHANGE UP (ref 7–14)
BUN SERPL-MCNC: 11 MG/DL — SIGNIFICANT CHANGE UP (ref 10–20)
CALCIUM SERPL-MCNC: 8.7 MG/DL — SIGNIFICANT CHANGE UP (ref 8.5–10.1)
CHLORIDE SERPL-SCNC: 103 MMOL/L — SIGNIFICANT CHANGE UP (ref 98–110)
CO2 SERPL-SCNC: 27 MMOL/L — SIGNIFICANT CHANGE UP (ref 17–32)
CREAT SERPL-MCNC: 1.2 MG/DL — SIGNIFICANT CHANGE UP (ref 0.7–1.5)
CULTURE RESULTS: NO GROWTH — SIGNIFICANT CHANGE UP
EGFR: 78 ML/MIN/1.73M2 — SIGNIFICANT CHANGE UP
GLUCOSE SERPL-MCNC: 110 MG/DL — HIGH (ref 70–99)
HCT VFR BLD CALC: 37.3 % — LOW (ref 42–52)
HGB BLD-MCNC: 11.8 G/DL — LOW (ref 14–18)
MAGNESIUM SERPL-MCNC: 2 MG/DL — SIGNIFICANT CHANGE UP (ref 1.8–2.4)
MCHC RBC-ENTMCNC: 25.6 PG — LOW (ref 27–31)
MCHC RBC-ENTMCNC: 31.6 G/DL — LOW (ref 32–37)
MCV RBC AUTO: 80.9 FL — SIGNIFICANT CHANGE UP (ref 80–94)
NRBC # BLD: 0 /100 WBCS — SIGNIFICANT CHANGE UP (ref 0–0)
PHOSPHATE SERPL-MCNC: 3.6 MG/DL — SIGNIFICANT CHANGE UP (ref 2.1–4.9)
PLATELET # BLD AUTO: 221 K/UL — SIGNIFICANT CHANGE UP (ref 130–400)
POTASSIUM SERPL-MCNC: 4.5 MMOL/L — SIGNIFICANT CHANGE UP (ref 3.5–5)
POTASSIUM SERPL-SCNC: 4.5 MMOL/L — SIGNIFICANT CHANGE UP (ref 3.5–5)
RBC # BLD: 4.61 M/UL — LOW (ref 4.7–6.1)
RBC # FLD: 13.7 % — SIGNIFICANT CHANGE UP (ref 11.5–14.5)
SODIUM SERPL-SCNC: 138 MMOL/L — SIGNIFICANT CHANGE UP (ref 135–146)
SPECIMEN SOURCE: SIGNIFICANT CHANGE UP
WBC # BLD: 4.85 K/UL — SIGNIFICANT CHANGE UP (ref 4.8–10.8)
WBC # FLD AUTO: 4.85 K/UL — SIGNIFICANT CHANGE UP (ref 4.8–10.8)

## 2022-04-11 PROCEDURE — 71045 X-RAY EXAM CHEST 1 VIEW: CPT | Mod: 26

## 2022-04-11 RX ORDER — SENNA PLUS 8.6 MG/1
2 TABLET ORAL AT BEDTIME
Refills: 0 | Status: DISCONTINUED | OUTPATIENT
Start: 2022-04-11 | End: 2022-04-12

## 2022-04-11 RX ORDER — ACETAMINOPHEN 500 MG
650 TABLET ORAL EVERY 6 HOURS
Refills: 0 | Status: DISCONTINUED | OUTPATIENT
Start: 2022-04-11 | End: 2022-04-12

## 2022-04-11 RX ORDER — BACITRACIN ZINC 500 UNIT/G
1 OINTMENT IN PACKET (EA) TOPICAL
Refills: 0 | Status: DISCONTINUED | OUTPATIENT
Start: 2022-04-11 | End: 2022-04-12

## 2022-04-11 RX ORDER — LANOLIN ALCOHOL/MO/W.PET/CERES
3 CREAM (GRAM) TOPICAL ONCE
Refills: 0 | Status: COMPLETED | OUTPATIENT
Start: 2022-04-11 | End: 2022-04-11

## 2022-04-11 RX ORDER — SIMETHICONE 80 MG/1
80 TABLET, CHEWABLE ORAL THREE TIMES A DAY
Refills: 0 | Status: DISCONTINUED | OUTPATIENT
Start: 2022-04-11 | End: 2022-04-12

## 2022-04-11 RX ADMIN — MEROPENEM 100 MILLIGRAM(S): 1 INJECTION INTRAVENOUS at 21:56

## 2022-04-11 RX ADMIN — Medication 400 MILLIGRAM(S): at 05:26

## 2022-04-11 RX ADMIN — MEROPENEM 100 MILLIGRAM(S): 1 INJECTION INTRAVENOUS at 05:03

## 2022-04-11 RX ADMIN — Medication 3 MILLIGRAM(S): at 21:56

## 2022-04-11 RX ADMIN — PANTOPRAZOLE SODIUM 40 MILLIGRAM(S): 20 TABLET, DELAYED RELEASE ORAL at 05:30

## 2022-04-11 RX ADMIN — SODIUM CHLORIDE 100 MILLILITER(S): 9 INJECTION, SOLUTION INTRAVENOUS at 14:25

## 2022-04-11 RX ADMIN — MEROPENEM 100 MILLIGRAM(S): 1 INJECTION INTRAVENOUS at 14:11

## 2022-04-11 RX ADMIN — MORPHINE SULFATE 2 MILLIGRAM(S): 50 CAPSULE, EXTENDED RELEASE ORAL at 08:54

## 2022-04-11 RX ADMIN — MORPHINE SULFATE 2 MILLIGRAM(S): 50 CAPSULE, EXTENDED RELEASE ORAL at 05:15

## 2022-04-11 RX ADMIN — Medication 1 APPLICATION(S): at 17:20

## 2022-04-11 RX ADMIN — MORPHINE SULFATE 2 MILLIGRAM(S): 50 CAPSULE, EXTENDED RELEASE ORAL at 04:57

## 2022-04-11 RX ADMIN — Medication 400 MILLIGRAM(S): at 14:10

## 2022-04-11 RX ADMIN — ONDANSETRON 4 MILLIGRAM(S): 8 TABLET, FILM COATED ORAL at 05:27

## 2022-04-11 RX ADMIN — Medication 400 MILLIGRAM(S): at 06:02

## 2022-04-11 NOTE — PROGRESS NOTE ADULT - ASSESSMENT
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 penile drain, on April 7 by Dr. Steiner at [facility], now presents to ER for evaluation of fever and chills.  Patient has been on oral Augmentin x1 month also has history of hypertension and recurrent multidrug-resistant urinary tract infections.  Patient is complaining of suprapubic pain.  Patient self caths.  ON admission he has fever but negative CXR and Urine analysis. The CT abd/pelvis shows Stable degree of caliectasis/hydroureteronephrosis bilaterally to the level of the neobladder without obstructing calculus. He has started on Meropenem and the ID consult requested to assist with further evaluation and antibiotic management.     # Post-op fever- s/p  cystoscopy with Laser hair removal and placed a ? penile drain-on 4/7/22 at [facility]  # Bladder exstrophy status post multiple bladder reconstructions  # Chronic Prostatitis- recent Cx grew E.coli and Enterococcus - s/p completed 4 weeks of oral Augmentin   # ALLERGIES: Cipro and Levaquin    would recommend:    1. Follow up Urine and Blood cultures  2. Monitor Temp and c/w supportive care  3. Continue Meropenem until work up is done  4. Agree with removal of ?penile drain and if still spikes fever then please add Linezolid based on previous C & S    d/w Patient     Attending Attestation:    Spent more than 45 minutes on total encounter, more than 50 % of the visit was spent counseling and/or coordinating care by the Attending physician.   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 penile drain, on April 7 by Dr. Steiner at [facility], now presents to ER for evaluation of fever and chills.  Patient has been on oral Augmentin x1 month also has history of hypertension and recurrent multidrug-resistant urinary tract infections.  Patient is complaining of suprapubic pain.  Patient self caths.  ON admission he has fever but negative CXR and Urine analysis. The CT abd/pelvis shows Stable degree of caliectasis/hydroureteronephrosis bilaterally to the level of the neobladder without obstructing calculus. He has started on Meropenem and the ID consult requested to assist with further evaluation and antibiotic management.     # Post-op fever- s/p  cystoscopy with Laser hair removal and placed a ? penile drain-on 4/7/22 at [facility]  # Bladder exstrophy status post multiple bladder reconstructions  # Chronic Prostatitis- recent Cx grew E.coli and Enterococcus - s/p completed 4 weeks of oral Augmentin   # ALLERGIES: Cipro and Levaquin    would recommend:    1. Obtain Procalcitonin and if less than 0.25 then please discontinue Abx  2. Monitor Temp and c/w supportive care  3. Continue Meropenem for now     d/w Patient     Attending Attestation:    Spent more than 45 minutes on total encounter, more than 50 % of the visit was spent counseling and/or coordinating care by the Attending physician.

## 2022-04-11 NOTE — PROGRESS NOTE ADULT - SUBJECTIVE AND OBJECTIVE BOX
S: Pt feeling a little better -   no FEVER THIS MORNING   Denies cough, SOB, nasal congestion, abdominal/flank pain.  He has a urethral catheter/stent in place which is causing discomfort.  He performs CIC via abdominal stoma at home but has opted to leave  catheter in place - urine is clear, pale yellow      O;   Vital Signs Last 24 Hrs  T(C): 36.3 (2022 05:20), Max: 38.3 (10 Apr 2022 19:55)  T(F): 97.3 (2022 05:20), Max: 100.9 (10 Apr 2022 19:55)  HR: 72 (2022 05:20) (70 - 92)  BP: 114/67 (2022 05:20) (99/50 - 114/67)  RR: 16 (10 Apr 2022 20:49) (16 - 18)    I&O's Summary    10 Apr 2022 07:01  -  2022 07:00  --------------------------------------------------------  IN: 480 mL / OUT: 3100 mL / NET: -2620 mL        MEDICATIONS  (STANDING):  dextrose 5% + sodium chloride 0.45%. 1000 milliLiter(s) (100 mL/Hr) IV Continuous <Continuous>  meropenem  IVPB 1000 milliGRAM(s) IV Intermittent every 8 hours  pantoprazole    Tablet 40 milliGRAM(s) Oral before breakfast  sodium chloride 0.9% Bolus 500 milliLiter(s) IV Bolus once  valsartan 80 milliGRAM(s) Oral daily    MEDICATIONS  (PRN):  acetaminophen     Tablet .. 650 milliGRAM(s) Oral every 6 hours PRN Temp greater or equal to 38C (100.4F)  ibuprofen  Tablet. 400 milliGRAM(s) Oral every 8 hours PRN Mild Pain (1 - 3)  morphine  - Injectable 2 milliGRAM(s) IV Push every 4 hours PRN Severe Pain (7 - 10)  ondansetron Injectable 4 milliGRAM(s) IV Push every 6 hours PRN Nausea and/or Vomiting    EXAM:  gen: NAD  lungs: cta  cvs: +tachy  abd: soft, NT/ND; abdominal stoma w/catheter in place, clear yellow urine  :  +urethral stent/catheter in place, suprapubic tube drainage is clear without sediments.  Multiple abdominal incisions well healed with no hernia  No urethral discharge seen at this time    Labs:                         138  |  103  |  11  ----------------------------<  110<H>  4.5   |  27  |  1.2    Ca    8.7      2022 05:24  Phos  3.6       Mg     2.0         TPro  5.6<L>  /  Alb  3.7  /  TBili  0.5  /  DBili  x   /  AST  17  /  ALT  15  /  AlkPhos  91  04-10                            11.8   4.85  )-----------( 221      ( 2022 05:24 )             37.3     CAPILLARY BLOOD GLUCOSE        CARDIAC MARKERS ( 2022 21:44 )  x     / <0.01 ng/mL / x     / x     / x            Color: Yellow / Appearance: Clear / S.020 / pH: x  Gluc: x / Ketone: Negative  / Bili: Negative / Urobili: 0.2 mg/dL   Blood: x / Protein: Negative mg/dL / Nitrite: Negative   Leuk Esterase: Negative / RBC: x / WBC x   Sq Epi: x / Non Sq Epi: x / Bacteria: x

## 2022-04-11 NOTE — PROGRESS NOTE ADULT - SUBJECTIVE AND OBJECTIVE BOX
Patient is seen and examined at the bed side, is afebrile.      REVIEW OF SYSTEMS: All other review systems are negative      ALLERGIES: Cipro (Other)  latex (Rash)  Levaquin (Other)  No Known Drug Allergies      Vital Signs Last 24 Hrs  T(C): 36.7 (2022 14:20), Max: 37.4 (10 Apr 2022 22:21)  T(F): 98 (2022 14:20), Max: 99.3 (10 Apr 2022 22:21)  HR: 75 (2022 14:20) (72 - 75)  BP: 121/60 (2022 14:20) (114/67 - 121/60)  BP(mean): --  RR: 16 (2022 14:20) (16 - 16)  SpO2: --      PHYSICAL EXAM:  GENERAL: Not in distress   CHEST/LUNG:  Not using accessory muscles   HEART: s1 and s2 present  ABDOMEN: Catheter from reconstructed bladder   EXTREMITIES: No pedal  edema  CNS: Awake and Alert      LABS:                        11.8   4.85  )-----------( 221      ( 2022 05:24 )             37.3                           11.7   6.81  )-----------( 229      ( 10 Apr 2022 07:45 )             36.7     04-11    138  |  103  |  11  ----------------------------<  110<H>  4.5   |  27  |  1.2    Ca    8.7      2022 05:24  Phos  3.6     04-11  Mg     2.0     04-11    TPro  5.6<L>  /  Alb  3.7  /  TBili  0.5  /  DBili  x   /  AST  17  /  ALT  15  /  AlkPhos  91  04-10    04-10    140  |  105  |  12  ----------------------------<  123<H>  4.0   |  27  |  1.2    Ca    8.9      10 Apr 2022 07:45    TPro  5.6<L>  /  Alb  3.7  /  TBili  0.5  /  DBili  x   /  AST  17  /  ALT  15  /  AlkPhos  91  04-10    PT/INR - ( 2022 21:44 )   PT: 11.50 sec;   INR: 1.00 ratio      PTT - ( 2022 21:44 )  PTT:31.1 sec          Urinalysis Basic - ( 2022 21:55 )  Color: Yellow / Appearance: Clear / S.020 / pH: x  Gluc: x / Ketone: Negative  / Bili: Negative / Urobili: 0.2 mg/dL   Blood: x / Protein: Negative mg/dL / Nitrite: Negative   Leuk Esterase: Negative / RBC: x / WBC x   Sq Epi: x / Non Sq Epi: x / Bacteria: x        MEDICATIONS  (STANDING):    BACItracin   Ointment 1 Application(s) Topical two times a day  dextrose 5% + sodium chloride 0.45%. 1000 milliLiter(s) (100 mL/Hr) IV Continuous <Continuous>  meropenem  IVPB 1000 milliGRAM(s) IV Intermittent every 8 hours  pantoprazole    Tablet 40 milliGRAM(s) Oral before breakfast  valsartan 80 milliGRAM(s) Oral daily      RADIOLOGY & ADDITIONAL TESTS:    < from: Xray Chest 1 View AP/PA (04.10.22 @ 15:46) >    No radiographic evidence of acute cardiopulmonary disease.      < from: CT Abdomen and Pelvis w/ IV Cont (22 @ 23:03) >  No definite acute pathology identified.    Limited evaluation of patient's neobladder which is decompressed   secondary to external catheter.    Stable degree of caliectasis/hydroureteronephrosis bilaterally to the   level of the neobladder without obstructing calculus. No CT evidence for       < from: Xray Chest 1 View-PORTABLE IMMEDIATE (22 @ 21:28) >  Impression:    No radiographic evidence of acute cardiopulmonary disease.      MICROBIOLOGY DATA:    Culture - Urine (22 @ 21:55)   Specimen Source: Clean Catch Clean Catch (Midstream)   Culture Results:   No growth   Culture - Blood (22 @ 21:44)   Specimen Source: .Blood Blood-Peripheral   Culture Results:   No growth to date.     Culture - Blood (22 @ 21:44)   Specimen Source: .Blood Blood-Peripheral   Culture Results:   No growth to date.   COVID-19 PCR (22 @ 21:44)   COVID-19 PCR: NotDetec:           Patient is seen and examined at the bed side, is afebrile for last 24 hours. The cultures are negative to date. As per patient the penile drain is out, no documentation noted in the system.       REVIEW OF SYSTEMS: All other review systems are negative      ALLERGIES: Cipro (Other)  latex (Rash)  Levaquin (Other)  No Known Drug Allergies      Vital Signs Last 24 Hrs  T(C): 36.7 (2022 14:20), Max: 37.4 (10 Apr 2022 22:21)  T(F): 98 (2022 14:20), Max: 99.3 (10 Apr 2022 22:21)  HR: 75 (2022 14:20) (72 - 75)  BP: 121/60 (2022 14:20) (114/67 - 121/60)  BP(mean): --  RR: 16 (2022 14:20) (16 - 16)  SpO2: --      PHYSICAL EXAM:  GENERAL: Not in distress   CHEST/LUNG:  Not using accessory muscles   HEART: s1 and s2 present  ABDOMEN: Catheter from reconstructed bladder in placed  EXTREMITIES: No pedal  edema  CNS: Awake and Alert      LABS:                        11.8   4.85  )-----------( 221      ( 2022 05:24 )             37.3                           11.7   6.81  )-----------( 229      ( 10 Apr 2022 07:45 )             36.7       04-11    138  |  103  |  11  ----------------------------<  110<H>  4.5   |  27  |  1.2    Ca    8.7      2022 05:24  Phos  3.6     04-11  Mg     2.0     04-11    TPro  5.6<L>  /  Alb  3.7  /  TBili  0.5  /  DBili  x   /  AST  17  /  ALT  15  /  AlkPhos  91  04-10    04-10    140  |  105  |  12  ----------------------------<  123<H>  4.0   |  27  |  1.2    Ca    8.9      10 Apr 2022 07:45    TPro  5.6<L>  /  Alb  3.7  /  TBili  0.5  /  DBili  x   /  AST  17  /  ALT  15  /  AlkPhos  91  04-10    PT/INR - ( 2022 21:44 )   PT: 11.50 sec;   INR: 1.00 ratio      PTT - ( 2022 21:44 )  PTT:31.1 sec          Urinalysis Basic - ( 2022 21:55 )  Color: Yellow / Appearance: Clear / S.020 / pH: x  Gluc: x / Ketone: Negative  / Bili: Negative / Urobili: 0.2 mg/dL   Blood: x / Protein: Negative mg/dL / Nitrite: Negative   Leuk Esterase: Negative / RBC: x / WBC x   Sq Epi: x / Non Sq Epi: x / Bacteria: x        MEDICATIONS  (STANDING):    BACItracin   Ointment 1 Application(s) Topical two times a day  dextrose 5% + sodium chloride 0.45%. 1000 milliLiter(s) (100 mL/Hr) IV Continuous <Continuous>  meropenem  IVPB 1000 milliGRAM(s) IV Intermittent every 8 hours  pantoprazole    Tablet 40 milliGRAM(s) Oral before breakfast  valsartan 80 milliGRAM(s) Oral daily      RADIOLOGY & ADDITIONAL TESTS:    < from: Xray Chest 1 View AP/PA (04.10.22 @ 15:46) >    No radiographic evidence of acute cardiopulmonary disease.      < from: CT Abdomen and Pelvis w/ IV Cont (22 @ 23:03) >No definite acute pathology identified.    Limited evaluation of patient's neobladder which is decompressed  secondary to external catheter.    Stable degree of caliectasis/hydroureteronephrosis bilaterally to the   level of the neobladder without obstructing calculus. No CT evidence for       < from: Xray Chest 1 View-PORTABLE IMMEDIATE (22 @ 21:28) >No radiographic evidence of acute cardiopulmonary disease.      MICROBIOLOGY DATA:    Culture - Urine (22 @ 21:55)   Specimen Source: Clean Catch Clean Catch (Midstream)   Culture Results: No growth     Culture - Blood (22 @ 21:44)   Specimen Source: .Blood Blood-Peripheral   Culture Results: No growth to date.     Culture - Blood (22 @ 21:44)   Specimen Source: .Blood Blood-Peripheral   Culture Results: No growth to date.     COVID-19 PCR (22 @ 21:44)   COVID-19 PCR: NotDetec:

## 2022-04-11 NOTE — PROGRESS NOTE ADULT - ASSESSMENT
This is a 42-year-old male with a history of HTN, bladder exstrophy, s/p multiple bladder reconstructions, CIC via abd stoma - now s/p stomal cystoscopy and laser epilation of urethral hair on 4/7/22 by Dr. Steiner.  He now presents with malaise and fever.  Rahul cough/SOB/dyspnea/abdominal/flank pain.    #Fever/sepsis - etiology? PROSTATITIS  ?viral  NO FEVER THIS AM   #R/O UTI   - CT A/P negative  - continue meropenem for now; F/ U  ID RECC  - urine/blood cultures: NEGATIVE   - continue pain meds   - f/u am labs    #HTN  - continue valsartan    #GI prophylaxis This is a 42-year-old male with a history of HTN, bladder exstrophy, s/p multiple bladder reconstructions, CIC via abd stoma - now s/p stomal cystoscopy and laser epilation of urethral hair on 4/7/22 by Dr. Steiner.  He now presents with malaise and fever.  Rahul cough/SOB/dyspnea/abdominal/flank pain.    #Fever/sepsis - etiology? PROSTATITIS  ?viral  NO FEVER THIS AM   #R/O UTI   - CT A/P negative  - continue meropenem for now; F/ U  ID RECC  - urine/blood cultures: NEGATIVE   - continue pain meds   - f/u am labs    I REMOVED THE URETHRAL CATHETER AT BEDSIDE AFTER D/W DR VALLEJO     #HTN  - continue valsartan    #GI prophylaxis

## 2022-04-12 ENCOUNTER — TRANSCRIPTION ENCOUNTER (OUTPATIENT)
Age: 42
End: 2022-04-12

## 2022-04-12 VITALS
RESPIRATION RATE: 16 BRPM | TEMPERATURE: 98 F | HEART RATE: 80 BPM | DIASTOLIC BLOOD PRESSURE: 62 MMHG | SYSTOLIC BLOOD PRESSURE: 119 MMHG

## 2022-04-12 LAB
ALBUMIN SERPL ELPH-MCNC: 4.1 G/DL — SIGNIFICANT CHANGE UP (ref 3.5–5.2)
ALP SERPL-CCNC: 96 U/L — SIGNIFICANT CHANGE UP (ref 30–115)
ALT FLD-CCNC: 25 U/L — SIGNIFICANT CHANGE UP (ref 0–41)
ANION GAP SERPL CALC-SCNC: 9 MMOL/L — SIGNIFICANT CHANGE UP (ref 7–14)
AST SERPL-CCNC: 22 U/L — SIGNIFICANT CHANGE UP (ref 0–41)
BILIRUB SERPL-MCNC: 0.3 MG/DL — SIGNIFICANT CHANGE UP (ref 0.2–1.2)
BUN SERPL-MCNC: 9 MG/DL — LOW (ref 10–20)
CALCIUM SERPL-MCNC: 9.7 MG/DL — SIGNIFICANT CHANGE UP (ref 8.5–10.1)
CHLORIDE SERPL-SCNC: 101 MMOL/L — SIGNIFICANT CHANGE UP (ref 98–110)
CO2 SERPL-SCNC: 29 MMOL/L — SIGNIFICANT CHANGE UP (ref 17–32)
CREAT SERPL-MCNC: 1.3 MG/DL — SIGNIFICANT CHANGE UP (ref 0.7–1.5)
EGFR: 71 ML/MIN/1.73M2 — SIGNIFICANT CHANGE UP
GLUCOSE SERPL-MCNC: 77 MG/DL — SIGNIFICANT CHANGE UP (ref 70–99)
HCT VFR BLD CALC: 40.4 % — LOW (ref 42–52)
HGB BLD-MCNC: 12.6 G/DL — LOW (ref 14–18)
MCHC RBC-ENTMCNC: 25.1 PG — LOW (ref 27–31)
MCHC RBC-ENTMCNC: 31.2 G/DL — LOW (ref 32–37)
MCV RBC AUTO: 80.5 FL — SIGNIFICANT CHANGE UP (ref 80–94)
NRBC # BLD: 0 /100 WBCS — SIGNIFICANT CHANGE UP (ref 0–0)
PLATELET # BLD AUTO: 281 K/UL — SIGNIFICANT CHANGE UP (ref 130–400)
POTASSIUM SERPL-MCNC: 4.8 MMOL/L — SIGNIFICANT CHANGE UP (ref 3.5–5)
POTASSIUM SERPL-SCNC: 4.8 MMOL/L — SIGNIFICANT CHANGE UP (ref 3.5–5)
PROCALCITONIN SERPL-MCNC: 0.26 NG/ML — HIGH (ref 0.02–0.1)
PROT SERPL-MCNC: 6.7 G/DL — SIGNIFICANT CHANGE UP (ref 6–8)
RBC # BLD: 5.02 M/UL — SIGNIFICANT CHANGE UP (ref 4.7–6.1)
RBC # FLD: 13.3 % — SIGNIFICANT CHANGE UP (ref 11.5–14.5)
SODIUM SERPL-SCNC: 139 MMOL/L — SIGNIFICANT CHANGE UP (ref 135–146)
WBC # BLD: 6.83 K/UL — SIGNIFICANT CHANGE UP (ref 4.8–10.8)
WBC # FLD AUTO: 6.83 K/UL — SIGNIFICANT CHANGE UP (ref 4.8–10.8)

## 2022-04-12 PROCEDURE — ZZZZZ: CPT

## 2022-04-12 RX ORDER — AZTREONAM 2 G
1 VIAL (EA) INJECTION
Qty: 14 | Refills: 0
Start: 2022-04-12 | End: 2022-04-18

## 2022-04-12 RX ADMIN — MEROPENEM 100 MILLIGRAM(S): 1 INJECTION INTRAVENOUS at 05:50

## 2022-04-12 RX ADMIN — Medication 400 MILLIGRAM(S): at 10:15

## 2022-04-12 RX ADMIN — Medication 400 MILLIGRAM(S): at 09:15

## 2022-04-12 RX ADMIN — SIMETHICONE 80 MILLIGRAM(S): 80 TABLET, CHEWABLE ORAL at 01:39

## 2022-04-12 RX ADMIN — MEROPENEM 100 MILLIGRAM(S): 1 INJECTION INTRAVENOUS at 13:54

## 2022-04-12 RX ADMIN — PANTOPRAZOLE SODIUM 40 MILLIGRAM(S): 20 TABLET, DELAYED RELEASE ORAL at 09:16

## 2022-04-12 RX ADMIN — SODIUM CHLORIDE 100 MILLILITER(S): 9 INJECTION, SOLUTION INTRAVENOUS at 01:40

## 2022-04-12 NOTE — PROGRESS NOTE ADULT - ASSESSMENT
Pt is a 42 year old male with a PMHx of bladder exstrophy s/p multiple bladder resections, CIC via abdominal stoma, now s/p stomal cystoscopy and laser epilation of urethral hair on 4/7/2022 by Dr. Steiner. Pt presents with malaise and fever, denies cough, SOB, dyspnea, abdominal/flank pain.    Plan  -Afebrile for >24 hours, possible dc home today  -CT negative  -Continue meropenem for now, follow up with ID for possible outpatient abx  -Obtain Procalcitonin and if less than 0.25 then please discontinue Abx per ID  -Urine and blood cultures: Negative  -Continue pain meds PRN  -Follow up am labs: WBC 4.8 (4/12/22)     Pt is a 42 year old male with a PMHx of bladder exstrophy s/p multiple bladder resections, CIC via abdominal stoma, now s/p stomal cystoscopy and laser epilation of urethral hair on 4/7/2022 by Dr. Steiner. Pt presents with malaise and fever, denies cough, SOB, dyspnea, abdominal/flank pain.    Plan  -Afebrile for >24 hours, possible dc home today  -CT negative  -Continue meropenem for now, follow up with ID for possible outpatient abx  -Obtain Procalcitonin and if less than 0.25 then please discontinue Abx per ID (PER LAB: PROCALCITONIN LEVEL MAY NOT BE RESULTED UNTIL LATE TONIGHT/TOMORROW) WILL  D/W ID  -Urine and blood cultures: Negative

## 2022-04-12 NOTE — PROGRESS NOTE ADULT - SUBJECTIVE AND OBJECTIVE BOX
RODRIGO MIR  42y, Male  Allergy: Cipro (Other)  latex (Rash)  Levaquin (Other)  No Known Drug Allergies      LOS  2d    CHIEF COMPLAINT: fever (12 Apr 2022 10:35)      INTERVAL EVENTS/HPI  - No acute events overnight  - T(F): , Max: 98 (04-11-22 @ 14:20)  - Denies any worsening symptoms  - Tolerating medication  - WBC Count: 6.83 (04-12-22 @ 11:21)  WBC Count: 4.85 (04-11-22 @ 05:24)  - uretheral drain removed yesterday      - Creatinine, Serum: 1.2 (04-11-22 @ 05:24)       ROS  General: Denies rigors, nightsweats  HEENT: Denies headache, rhinorrhea, sore throat, eye pain  CV: Denies CP, palpitations  PULM: Denies wheezing, hemoptysis  GI: Denies hematemesis, hematochezia, melena  : Denies discharge, hematuria  MSK: Denies arthralgias, myalgias  SKIN: Denies rash, lesions  NEURO: Denies paresthesias, weakness  PSYCH: Denies depression, anxiety    VITALS:  T(F): 97, Max: 98 (04-11-22 @ 14:20)  HR: 74  BP: 114/68  RR: 18Vital Signs Last 24 Hrs  T(C): 36.1 (12 Apr 2022 05:00), Max: 36.7 (11 Apr 2022 14:20)  T(F): 97 (12 Apr 2022 05:00), Max: 98 (11 Apr 2022 14:20)  HR: 74 (12 Apr 2022 05:00) (72 - 75)  BP: 114/68 (12 Apr 2022 05:00) (112/57 - 121/60)  BP(mean): --  RR: 18 (12 Apr 2022 05:00) (16 - 18)  SpO2: --    PHYSICAL EXAM:  Gen: NAD, resting in bed  HEENT: Normocephalic, atraumatic  Neck: supple, no lymphadenopathy  CV: Regular rate & regular rhythm  Lungs: decreased BS at bases, no fremitus  Abdomen: Soft, BS present  Ext: Warm, well perfused  Neuro: non focal, awake  Skin: no rash, no erythema  Lines: no phlebitis    FH: Non-contributory  Social Hx: Non-contributory    TESTS & MEASUREMENTS:                        12.6   6.83  )-----------( 281      ( 12 Apr 2022 11:21 )             40.4     04-11    138  |  103  |  11  ----------------------------<  110<H>  4.5   |  27  |  1.2    Ca    8.7      11 Apr 2022 05:24  Phos  3.6     04-11  Mg     2.0     04-11              Culture - Urine (collected 04-09-22 @ 21:55)  Source: Clean Catch Clean Catch (Midstream)  Final Report (04-11-22 @ 07:39):    No growth    Culture - Blood (collected 04-09-22 @ 21:44)  Source: .Blood Blood-Peripheral  Preliminary Report (04-11-22 @ 07:01):    No growth to date.    Culture - Blood (collected 04-09-22 @ 21:44)  Source: .Blood Blood-Peripheral  Preliminary Report (04-11-22 @ 07:01):    No growth to date.        Lactate, Blood: 1.4 mmol/L (04-10-22 @ 00:14)  Lactate, Blood: 2.3 mmol/L (04-09-22 @ 21:44)      INFECTIOUS DISEASES TESTING  COVID-19 PCR: NotDetec (04-09-22 @ 21:44)      INFLAMMATORY MARKERS      RADIOLOGY & ADDITIONAL TESTS:  I have personally reviewed the last available Chest xray  CXR  Xray Chest 1 View AP/PA:   ACC: [id number] EXAM:  XR CHEST 1 VIEW                          PROCEDURE DATE:  04/10/2022          INTERPRETATION:  Clinical History / Reason for exam: Fever    Comparison : Chest radiograph April 9, 2022.    Technique/Positioning: Single AP view of the chest.    Findings:    Support devices: None.    Cardiac/mediastinum/hilum: Unremarkable.    Lung parenchyma/Pleura: Within normal limits.    Skeleton/soft tissues: Unchanged.    Impression:    No radiographic evidence of acute cardiopulmonary disease.        --- End of Report ---            ELLIOT LANDAU MD; Attending Radiologist  This document has been electronically signed. Apr 10 2022  4:23PM (04-10-22 @ 15:46)      CT  CT Abdomen and Pelvis w/ IV Cont:   ACC: [id number] EXAM:  CT ABDOMEN AND PELVIS IC                          PROCEDURE DATE:  04/09/2022          INTERPRETATION:  CLINICAL STATEMENT: Postoperative fever. Urological   surgery.    TECHNIQUE: Contiguous axial CT images were obtained from the lower chest   to the pubic symphysis after administration of 95 cc Optiray 350 IV   contrast.  Oral contrast was not administered.  Reformatted images in the   coronal and sagittal planes were acquired.    COMPARISON CT: CT abdomen and pelvis dated 10/23/2014. Correlation is   made with Pelvic MR dated 3/4/2022.    FINDINGS:    LOWER CHEST: Atelectatic changes.    HEPATOBILIARY: Unremarkable.    SPLEEN: Unremarkable.    PANCREAS: Unremarkable.    ADRENAL GLANDS: Unremarkable.    KIDNEYS AND BLADDER: Symmetric pattern of renal enhancement. There is   stable degree of caliectasis/hydroureteronephrosis to the level of a   decompressed neobladder without evidence for obstructing calculus.   Overall limited evaluation of the neobladder which isdecompressed   secondary to a external drainage catheter entering the right abdominal   wall. An additional penile catheter is noted terminating just prior to   the prostate gland.    ABDOMINOPELVIC NODES: No enlarged abdominal or pelvic lymph nodes by size   criteria.    PERITONEUM/MESENTERY/BOWEL: No intraperitoneal free air, ascites, or   bowel obstruction. Small bowel anastomoses in the lower abdomen. The   appendix is not definitively visualized however there is no pericecal   inflammation.    BONES/SOFT TISSUES: No acute osseous abnormality.    OTHER: The abdominal aorta is of normal caliber. Bilateral fat-containing   inguinal hernias.    IMPRESSION:    No definite acute pathology identified.    Limited evaluation of patient's neobladder which is decompressed   secondary to external catheter.    Stable degree of caliectasis/hydroureteronephrosis bilaterally to the   level of the neobladder without obstructing calculus. No CT evidence for   pyelonephritis.    --- End of Report ---          DG RITTER MD; Resident Radiologist  This document has been electronically signed.  PO IBARRA MD; Attending Radiologist  This document has been electronically signed. Apr 10 2022  1:58AM (04-09-22 @ 23:03)      CARDIOLOGY TESTING  12 Lead ECG:   Ventricular Rate 110 BPM    Atrial Rate 110 BPM    P-R Interval 136 ms    QRS Duration 76 ms    Q-T Interval 312 ms    QTC Calculation(Bazett) 422 ms    P Axis 49 degrees    R Axis 50 degrees    T Axis 62 degrees    Diagnosis Line Sinus tachycardia  Otherwise normal ECG    Confirmed by SANJEEV ALVAREZ MD ([id number]) on 4/10/2022 9:28:23 AM (04-09-22 @ 21:32)      MEDICATIONS  BACItracin   Ointment 1 Topical two times a day  dextrose 5% + sodium chloride 0.45%. 1000 IV Continuous <Continuous>  meropenem  IVPB 1000 IV Intermittent every 8 hours  pantoprazole    Tablet 40 Oral before breakfast  senna 2 Oral at bedtime  valsartan 80 Oral daily      WEIGHT  Weight (kg): 73.6 (04-10-22 @ 02:22)      ANTIBIOTICS:  meropenem  IVPB 1000 milliGRAM(s) IV Intermittent every 8 hours      All available historical records have been reviewed

## 2022-04-12 NOTE — DISCHARGE NOTE PROVIDER - CARE PROVIDER_API CALL
Vicki Nguyễn)  Urology  [address] [place] [address], [address]  [place] [address]  Phone: [phone]  Fax: [phone]  Follow Up Time: 1-3 days

## 2022-04-12 NOTE — DISCHARGE NOTE PROVIDER - HOSPITAL COURSE
Pt is a 42 year old male with a PMHx of bladder exstrophy s/p multiple bladder resections, CIC via abdominal stoma, now s/p stomal cystoscopy and laser epilation of urethral hair on 4/7/2022 by Dr. Steiner. Pt presents with malaise and fever, denies cough, SOB, dyspnea, abdominal/flank pain.    # Bladder exstrophy status post multiple bladder reconstructions  # Chronic Prostatitis- recent Cx grew E.coli and Enterococcus - s/p completed 4 weeks of oral Augmentin   # ALLERGIES: Cipro and Levaquin    CBC WNL; placed on meropenem    Recommendations  - follow-up Procalcitonin   - fevers resolved after removal of penile drain 4/11-- unclear if this was contributing to fevers?   - reviewed previous urine/urethral cx -- colonized with Enterococcus, E coli, ESBL E coli, and Prevotella  -- if procalcitonin does not return in time and he remains afebrile, can consider Augmentin 875/125 mg BID and Bactrim 1DS BID to complete 7 days from removal of penile drain -- will need close follow-up afterwards  - Continue Meropenem for now

## 2022-04-12 NOTE — DISCHARGE NOTE PROVIDER - NSDCMRMEDTOKEN_GEN_ALL_CORE_FT
omeprazole 40 mg oral delayed release capsule: orally 2 times a day  valsartan 80 mg oral tablet: 1 tab(s) orally once a day, am   amoxicillin-clavulanate 875 mg-125 mg oral tablet: 1 tab(s) orally every 12 hours   Bactrim  mg-160 mg oral tablet: 1 tab(s) orally every 12 hours   omeprazole 40 mg oral delayed release capsule: orally 2 times a day  valsartan 80 mg oral tablet: 1 tab(s) orally once a day, am

## 2022-04-12 NOTE — DISCHARGE NOTE PROVIDER - NSDCFUADDINST_GEN_ALL_CORE_FT
If you experience abdominal pain or recurrence of fevers - please return to ER    Please follow up your lab work with your urologist and take antibiotics as instructed

## 2022-04-12 NOTE — DISCHARGE NOTE PROVIDER - CARE PROVIDERS DIRECT ADDRESSES
[email or web address]

## 2022-04-12 NOTE — PROGRESS NOTE ADULT - NS ATTEND AMEND GEN_ALL_CORE FT
Epigastric pain x 0500, vomited x 2, 1  soft bm Ibuprofen 200 mg 0530 without relief
Prostatitis  Agree with IV antibiotiocs  f/up urine/blood culture  ID evaluation  Will d/c urethral stent tomorrow.
feeling much better since urethral catheter removed  conversation with ID - recommend DC with augmenting and bactrim, both BID  he knows to follow up with his urologist - Dr. Steiner  call back immediately if symtpoms return  probiotics given that he has been on multiple antibiotics  self catheter self as needed

## 2022-04-12 NOTE — DISCHARGE NOTE NURSING/CASE MANAGEMENT/SOCIAL WORK - NSDCPEFALRISK_GEN_ALL_CORE
For information on Fall & Injury Prevention, visit: [email or web address] OR  [email or web address] OR  https://www.cdc.gov/steadi/patient.html

## 2022-04-12 NOTE — PROGRESS NOTE ADULT - ASSESSMENT
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 penile drain, on April 7 by Dr. Steiner at [facility], now presents to ER for evaluation of fever and chills.  Patient has been on oral Augmentin x1 month also has history of hypertension and recurrent multidrug-resistant urinary tract infections.  Patient is complaining of suprapubic pain.  Patient self caths.  ON admission he has fever but negative CXR and Urine analysis. The CT abd/pelvis shows Stable degree of caliectasis/hydroureteronephrosis bilaterally to the level of the neobladder without obstructing calculus. He has started on Meropenem and the ID consult requested to assist with further evaluation and antibiotic management.     # Post-op fever- s/p  cystoscopy with Laser hair removal and placed a ? penile drain-on 4/7/22 at [facility]  - drain removed 4/11  # Bladder exstrophy status post multiple bladder reconstructions  # Chronic Prostatitis- recent Cx grew E.coli and Enterococcus - s/p completed 4 weeks of oral Augmentin   # ALLERGIES: Cipro and Levaquin    Recommendations  - follow-up Procalcitonin   - fevers resolved after removal of penile drain -- unclear if this was contributing to fevers?   - reviewed previous urine/urethral cx -- colonized with Enterococcus, E coli, ESBL E coli, and Prevotella  -- if procalcitonin does not return in time and he remains afebrile, can consider Augmentin 875/125 mg BID and Bactrim 1DS BID to complete 7 days from removal of penile drain -- will need close follow-up afterwards  - Continue Meropenem for now       Please call or message on [employer] Teams if with any questions.  Spectra [phone]

## 2022-04-12 NOTE — DISCHARGE NOTE NURSING/CASE MANAGEMENT/SOCIAL WORK - PATIENT PORTAL LINK FT
You can access the FollowMyHealth Patient Portal offered by [facility] by registering at the following website: [email or web address]. By joining [facility]’s FollowMyHealth portal, you will also be able to view your health information using other applications (apps) compatible with our system.

## 2022-04-12 NOTE — PROGRESS NOTE ADULT - SUBJECTIVE AND OBJECTIVE BOX
Pt endorses fatigue and headache for which he was given ibuprofen.  Pt denies abdominal pain, flank pain and dysuria.  Suprapubic tube in place and draining pale yellow urine.    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 1000 milliLiter(s) (100 mL/Hr) IV Continuous <Continuous>  meropenem  IVPB 1000 milliGRAM(s) IV Intermittent every 8 hours  pantoprazole    Tablet 40 milliGRAM(s) Oral before breakfast  senna 2 Tablet(s) Oral at bedtime  valsartan 80 milliGRAM(s) Oral daily    MEDICATIONS  (PRN):  acetaminophen     Tablet .. 650 milliGRAM(s) Oral every 6 hours PRN Temp greater or equal to 38C (100.4F), Mild Pain (1 - 3)  ibuprofen  Tablet. 400 milliGRAM(s) Oral every 8 hours PRN Mild Pain (1 - 3)  morphine  - Injectable 2 milliGRAM(s) IV Push every 4 hours PRN Severe Pain (7 - 10)  ondansetron Injectable 4 milliGRAM(s) IV Push every 6 hours PRN Nausea and/or Vomiting  simethicone 80 milliGRAM(s) Chew three times a day PRN Gas    Vital Signs Last 24 Hrs  T(C): 36.1 (12 Apr 2022 05:00), Max: 36.7 (11 Apr 2022 14:20)  T(F): 97 (12 Apr 2022 05:00), Max: 98 (11 Apr 2022 14:20)  HR: 74 (12 Apr 2022 05:00) (72 - 75)  BP: 114/68 (12 Apr 2022 05:00) (112/57 - 121/60)  RR: 18 (12 Apr 2022 05:00) (16 - 18)    Physical Exam  General: Nontoxic  Chest: CTA bilaterally  Cardio: S1 and S2 with RRR  Abdomen: + bowel sounds, soft, nondistended, nontender. No rebound tenderness or guarding.  : +urethral stent/catheter in place, suprapubic tube drainage is clear without sediments.  Multiple abdominal incisions well healed with no hernia  No urethral discharge seen at this time                          11.8   4.85  )-----------( 221      ( 11 Apr 2022 05:24 )             37.3     04-11    138  |  103  |  11  ----------------------------<  110<H>  4.5   |  27  |  1.2    Ca    8.7      11 Apr 2022 05:24  Phos  3.6     04-11  Mg     2.0     04-11     Pt endorses fatigue and headache for which he was given ibuprofen.  Pt denies abdominal pain, flank pain and dysuria.  Suprapubic tube in place and draining pale yellow urine. 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 1000 milliLiter(s) (100 mL/Hr) IV Continuous <Continuous>  meropenem  IVPB 1000 milliGRAM(s) IV Intermittent every 8 hours  pantoprazole    Tablet 40 milliGRAM(s) Oral before breakfast  senna 2 Tablet(s) Oral at bedtime  valsartan 80 milliGRAM(s) Oral daily    MEDICATIONS  (PRN):  acetaminophen     Tablet .. 650 milliGRAM(s) Oral every 6 hours PRN Temp greater or equal to 38C (100.4F), Mild Pain (1 - 3)  ibuprofen  Tablet. 400 milliGRAM(s) Oral every 8 hours PRN Mild Pain (1 - 3)  morphine  - Injectable 2 milliGRAM(s) IV Push every 4 hours PRN Severe Pain (7 - 10)  ondansetron Injectable 4 milliGRAM(s) IV Push every 6 hours PRN Nausea and/or Vomiting  simethicone 80 milliGRAM(s) Chew three times a day PRN Gas    Vital Signs Last 24 Hrs  T(C): 36.1 (12 Apr 2022 05:00), Max: 36.7 (11 Apr 2022 14:20)  T(F): 97 (12 Apr 2022 05:00), Max: 98 (11 Apr 2022 14:20)  HR: 74 (12 Apr 2022 05:00) (72 - 75)  BP: 114/68 (12 Apr 2022 05:00) (112/57 - 121/60)  RR: 18 (12 Apr 2022 05:00) (16 - 18)    Physical Exam  General: Nontoxic  Chest: CTA bilaterally  Cardio: S1 and S2 with RRR  Abdomen: + bowel sounds, soft, nondistended, nontender. No rebound tenderness or guarding.  : suprapubic tube drainage is clear without sediments.  Multiple abdominal incisions well healed with no hernia                          11.8   4.85  )-----------( 221      ( 11 Apr 2022 05:24 )             37.3     04-11    138  |  103  |  11  ----------------------------<  110<H>  4.5   |  27  |  1.2    Ca    8.7      11 Apr 2022 05:24  Phos  3.6     04-11  Mg     2.0     04-11

## 2022-04-13 LAB — PROCALCITONIN SERPL-MCNC: 0.39 NG/ML — HIGH (ref 0.02–0.1)

## 2022-04-15 LAB
CULTURE RESULTS: SIGNIFICANT CHANGE UP
CULTURE RESULTS: SIGNIFICANT CHANGE UP
SPECIMEN SOURCE: SIGNIFICANT CHANGE UP
SPECIMEN SOURCE: SIGNIFICANT CHANGE UP

## 2022-04-19 DIAGNOSIS — R50.9 FEVER, UNSPECIFIED: ICD-10-CM

## 2022-04-19 DIAGNOSIS — R50.82 POSTPROCEDURAL FEVER: ICD-10-CM

## 2022-04-19 DIAGNOSIS — N28.89 OTHER SPECIFIED DISORDERS OF KIDNEY AND URETER: ICD-10-CM

## 2022-04-19 DIAGNOSIS — N41.1 CHRONIC PROSTATITIS: ICD-10-CM

## 2022-04-19 DIAGNOSIS — Z88.1 ALLERGY STATUS TO OTHER ANTIBIOTIC AGENTS STATUS: ICD-10-CM

## 2022-04-19 DIAGNOSIS — Z87.440 PERSONAL HISTORY OF URINARY (TRACT) INFECTIONS: ICD-10-CM

## 2022-04-19 DIAGNOSIS — K21.9 GASTRO-ESOPHAGEAL REFLUX DISEASE WITHOUT ESOPHAGITIS: ICD-10-CM

## 2022-04-19 DIAGNOSIS — I10 ESSENTIAL (PRIMARY) HYPERTENSION: ICD-10-CM

## 2022-04-19 DIAGNOSIS — Q64.10 EXSTROPHY OF URINARY BLADDER, UNSPECIFIED: ICD-10-CM

## 2022-04-19 DIAGNOSIS — A41.9 SEPSIS, UNSPECIFIED ORGANISM: ICD-10-CM
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
## 2022-04-19 DIAGNOSIS — Z91.040 LATEX ALLERGY STATUS: ICD-10-CM

## 2022-06-15 PROBLEM — N32.9 BLADDER DISORDER, UNSPECIFIED: Chronic | Status: ACTIVE | Noted: 2018-03-26

## 2022-07-04 ENCOUNTER — NON-APPOINTMENT (OUTPATIENT)
Age: 42
End: 2022-07-04

## 2022-07-27 ENCOUNTER — APPOINTMENT (OUTPATIENT)
Dept: PEDIATRIC UROLOGY | Facility: CLINIC | Age: 42
End: 2022-07-27

## 2022-07-27 VITALS — HEIGHT: 68 IN | BODY MASS INDEX: 21.22 KG/M2 | WEIGHT: 140 LBS | TEMPERATURE: 97.4 F

## 2022-07-27 DIAGNOSIS — Z87.448 PERSONAL HISTORY OF OTHER DISEASES OF URINARY SYSTEM: ICD-10-CM

## 2022-07-27 PROCEDURE — 99213 OFFICE O/P EST LOW 20 MIN: CPT

## 2022-07-27 NOTE — DISCHARGE NOTE PROVIDER - NSDCDCMDCOMP_GEN_ALL_CORE
Patient is critically ill, requiring critical care services.
This document is complete and the patient is ready for discharge.

## 2022-07-28 NOTE — CONSULT LETTER
[FreeTextEntry1] : Dr. Clemons ,\par \par I had the pleasure of seeing RODRIGO MIR. Please see my note below. Briefly, aside from the febrile episode that ultimately failed to demonstrate bacteremia, he has done well. From a symptomatic stand point, the previously noted nodular area at his penopubic junction is no longer uncomfortable. I had a long javon discussion regarding reconstruction for his urethra and he wisely agreed not to pursue urethroplasty. In regards to his prostatic infections, I agree this chronic bacterial prostatitis and is extremely difficult to treat with abx alone. I discussed TURP as an option however this doesn't always lead to cure. Will go for cysto and laser of his urethral hair once again in a few months.\par \par Thank you for allowing me to participate in the care of this patient. Please feel free to contact me with any questions\par \par Tomasz Steiner MD\par [facility]\par Pediatric Urology\par [facility]

## 2022-07-28 NOTE — HISTORY OF PRESENT ILLNESS
[TextBox_4] : 41 y/o M h/o bladder exstrophy s/p closure and lower tract recon here for follow up. Had cysto and laser depilation of the urethral hair and his post-op course was complicated by fevers w/o bacteremia. Suspect endotoxin to be the cause as there was no bacteria isolated, no focus of infection, and known hx of urinary tract infections. Since his surgery, he has been doing well. He states there was drastic improvement in regards to the firm lump he had at the base of the penis and discharge per urethra. He is currently taking Hiprex. \par \par Denies F/C

## 2022-07-28 NOTE — ASSESSMENT
[FreeTextEntry1] : 41 y/o M h/o exstrophy s/p repair, s/p cysto, laser depilation of urethral hair\par - discussed intraop findings again with pt, the urethra is immediately deep to the skin on the dorsum with very little subcutaneous tissue, this would make urethral reconstruction with graft very high risk given there is a low chance a graft will take during an augmentation urethroplasty, even with flap reconstruction, there is a risk of flap necrosis and the risk of urethrocutaneous fistula, pt would like to hold off on major reconstruction\par - as for his prostatic symptoms, agree with adult consultant that this is likely chronic bacterial prostatitis that con't to smolder, TURP has been described to rid the pt of infected tissue however cure is not guaranteed\par - would plan for another cysto, possible depilation in a few months, pt will obtain cultures and start abx as appropriate

## 2022-07-28 NOTE — PHYSICAL EXAM
[Acute distress] : no acute distress [TextBox_37] : S/ND, multiple abdominal scars [TextBox_92] : No tenderness of of the penopubic junction, no urethral discharge

## 2022-09-29 ENCOUNTER — NON-APPOINTMENT (OUTPATIENT)
Age: 42
End: 2022-09-29

## 2022-11-03 ENCOUNTER — OUTPATIENT (OUTPATIENT)
Dept: OUTPATIENT SERVICES | Facility: HOSPITAL | Age: 42
LOS: 1 days | Discharge: HOME | End: 2022-11-03

## 2022-11-03 VITALS
SYSTOLIC BLOOD PRESSURE: 131 MMHG | DIASTOLIC BLOOD PRESSURE: 79 MMHG | WEIGHT: 139.99 LBS | HEART RATE: 60 BPM | TEMPERATURE: 98 F | RESPIRATION RATE: 14 BRPM | OXYGEN SATURATION: 100 % | HEIGHT: 68 IN

## 2022-11-03 DIAGNOSIS — Z98.890 OTHER SPECIFIED POSTPROCEDURAL STATES: Chronic | ICD-10-CM

## 2022-11-03 DIAGNOSIS — N36.9 URETHRAL DISORDER, UNSPECIFIED: ICD-10-CM

## 2022-11-03 DIAGNOSIS — Z90.49 ACQUIRED ABSENCE OF OTHER SPECIFIED PARTS OF DIGESTIVE TRACT: Chronic | ICD-10-CM

## 2022-11-03 DIAGNOSIS — Z01.818 ENCOUNTER FOR OTHER PREPROCEDURAL EXAMINATION: ICD-10-CM

## 2022-11-03 LAB
ALBUMIN SERPL ELPH-MCNC: 5 G/DL — SIGNIFICANT CHANGE UP (ref 3.5–5.2)
ALP SERPL-CCNC: 129 U/L — HIGH (ref 30–115)
ALT FLD-CCNC: 17 U/L — SIGNIFICANT CHANGE UP (ref 0–41)
ANION GAP SERPL CALC-SCNC: 16 MMOL/L — HIGH (ref 7–14)
APTT BLD: 30.5 SEC — SIGNIFICANT CHANGE UP (ref 27–39.2)
AST SERPL-CCNC: 18 U/L — SIGNIFICANT CHANGE UP (ref 0–41)
BASOPHILS # BLD AUTO: 0.04 K/UL — SIGNIFICANT CHANGE UP (ref 0–0.2)
BASOPHILS NFR BLD AUTO: 0.7 % — SIGNIFICANT CHANGE UP (ref 0–1)
BILIRUB SERPL-MCNC: 0.4 MG/DL — SIGNIFICANT CHANGE UP (ref 0.2–1.2)
BUN SERPL-MCNC: 23 MG/DL — HIGH (ref 10–20)
CALCIUM SERPL-MCNC: 10 MG/DL — SIGNIFICANT CHANGE UP (ref 8.4–10.5)
CHLORIDE SERPL-SCNC: 99 MMOL/L — SIGNIFICANT CHANGE UP (ref 98–110)
CO2 SERPL-SCNC: 24 MMOL/L — SIGNIFICANT CHANGE UP (ref 17–32)
CREAT SERPL-MCNC: 1 MG/DL — SIGNIFICANT CHANGE UP (ref 0.7–1.5)
EGFR: 97 ML/MIN/1.73M2 — SIGNIFICANT CHANGE UP
EOSINOPHIL # BLD AUTO: 0.08 K/UL — SIGNIFICANT CHANGE UP (ref 0–0.7)
EOSINOPHIL NFR BLD AUTO: 1.4 % — SIGNIFICANT CHANGE UP (ref 0–8)
GLUCOSE SERPL-MCNC: 95 MG/DL — SIGNIFICANT CHANGE UP (ref 70–99)
HCT VFR BLD CALC: 45.4 % — SIGNIFICANT CHANGE UP (ref 42–52)
HGB BLD-MCNC: 14.8 G/DL — SIGNIFICANT CHANGE UP (ref 14–18)
IMM GRANULOCYTES NFR BLD AUTO: 0.3 % — SIGNIFICANT CHANGE UP (ref 0.1–0.3)
INR BLD: 0.98 RATIO — SIGNIFICANT CHANGE UP (ref 0.65–1.3)
LYMPHOCYTES # BLD AUTO: 1.87 K/UL — SIGNIFICANT CHANGE UP (ref 1.2–3.4)
LYMPHOCYTES # BLD AUTO: 31.6 % — SIGNIFICANT CHANGE UP (ref 20.5–51.1)
MCHC RBC-ENTMCNC: 25.4 PG — LOW (ref 27–31)
MCHC RBC-ENTMCNC: 32.6 G/DL — SIGNIFICANT CHANGE UP (ref 32–37)
MCV RBC AUTO: 78 FL — LOW (ref 80–94)
MONOCYTES # BLD AUTO: 0.45 K/UL — SIGNIFICANT CHANGE UP (ref 0.1–0.6)
MONOCYTES NFR BLD AUTO: 7.6 % — SIGNIFICANT CHANGE UP (ref 1.7–9.3)
NEUTROPHILS # BLD AUTO: 3.45 K/UL — SIGNIFICANT CHANGE UP (ref 1.4–6.5)
NEUTROPHILS NFR BLD AUTO: 58.4 % — SIGNIFICANT CHANGE UP (ref 42.2–75.2)
NRBC # BLD: 0 /100 WBCS — SIGNIFICANT CHANGE UP (ref 0–0)
PLATELET # BLD AUTO: 349 K/UL — SIGNIFICANT CHANGE UP (ref 130–400)
POTASSIUM SERPL-MCNC: 4.7 MMOL/L — SIGNIFICANT CHANGE UP (ref 3.5–5)
POTASSIUM SERPL-SCNC: 4.7 MMOL/L — SIGNIFICANT CHANGE UP (ref 3.5–5)
PROT SERPL-MCNC: 7.3 G/DL — SIGNIFICANT CHANGE UP (ref 6–8)
PROTHROM AB SERPL-ACNC: 11.2 SEC — SIGNIFICANT CHANGE UP (ref 9.95–12.87)
RBC # BLD: 5.82 M/UL — SIGNIFICANT CHANGE UP (ref 4.7–6.1)
RBC # FLD: 13.9 % — SIGNIFICANT CHANGE UP (ref 11.5–14.5)
SODIUM SERPL-SCNC: 139 MMOL/L — SIGNIFICANT CHANGE UP (ref 135–146)
WBC # BLD: 5.91 K/UL — SIGNIFICANT CHANGE UP (ref 4.8–10.8)
WBC # FLD AUTO: 5.91 K/UL — SIGNIFICANT CHANGE UP (ref 4.8–10.8)

## 2022-11-03 PROCEDURE — 93010 ELECTROCARDIOGRAM REPORT: CPT

## 2022-11-03 RX ORDER — OMEPRAZOLE 10 MG/1
0 CAPSULE, DELAYED RELEASE ORAL
Qty: 0 | Refills: 0 | DISCHARGE

## 2022-11-03 NOTE — H&P PST ADULT - REASON FOR ADMISSION
Case Type: OP Non-block TimeSuite: OR NorthProceduralist: Cristiano Murphy  Confirmed Surgery DateTime: 11-   Procedure: CYSTOSCOPY  Laterality: N/ALength of Procedure: 60 Minutes  Anesthesia Type: General

## 2022-11-03 NOTE — H&P PST ADULT - HISTORY OF PRESENT ILLNESS
43 yo male presents for PAST in preparation for cystoscopy  Pt complains of . Denies any chest pain, difficulty breathing, SOB, palpitations, dysuria, URI, or any other infections in the last 2 weeks/1 month. Denies any recent travel, contact, or exposure to any persons with known or suspected COVID-19. Pt also denies COVID testing within the last 2 weeks. Pt advised to self quarantine until day of procedure. Exercise tolerance of 2-3 flights of stairs without dyspnea. ELSY reviewed with patient.  Anesthesia Alert  NO--Difficult Airway  NO--History of neck surgery or radiation  NO--Limited ROM of neck  NO--History of Malignant hyperthermia  NO--Personal or family history of Pseudocholinesterase deficiency.  NO--Prior Anesthesia Complication  YES--Latex Allergy, rash  NO--Loose teeth  NO--History of Rheumatoid Arthritis  NO--ELSY  NO--Bleeding risk  NO--Other   written and verbal instructions with teach back on chlorhexidine shampoo provided,  pt verbalized understanding with returned demonstration  Patient verbalized understanding of instructions and was given the opportunity to ask questions and have them answered.   43 yo male with PMH of bladder atrophy (multiple surgeries), cystoscopies, prostitis self-catheterize since birth  presents for PAST in preparation for cystoscopy scheduled on 11/11/22 at [facility] to be done by Dr Steiner (pediatric urologist). Pt is currently treated with Augmentin for reoccuring UTI.  Pt complains of chronic frequent suprapubic pain. pt is scheduled for serveillence cystoscopy.  Denies any chest pain, difficulty breathing, SOB, palpitations, dysuria, hematuria, URI, or any other infections in the last 2 weeks/1 month. Denies any recent travel, contact, or exposure to any persons with known or suspected COVID-19. Pt also denies COVID testing within the last 2 weeks. Pt advised to self quarantine until day of procedure. Exercise tolerance of 2-3 flights of stairs without dyspnea. ELSY reviewed with patient.    Anesthesia Alert  NO--Difficult Airway  NO--History of neck surgery or radiation  NO--Limited ROM of neck  NO--History of Malignant hyperthermia  NO--Personal or family history of Pseudocholinesterase deficiency.  NO--Prior Anesthesia Complication  YES--Latex Allergy, rash  NO--Loose teeth  NO--History of Rheumatoid Arthritis  NO--ELSY  NO--Bleeding risk  NO--Other   written and verbal instructions with teach back on chlorhexidine shampoo provided,  pt verbalized understanding with returned demonstration  Patient verbalized understanding of instructions and was given the opportunity to ask questions and have them answered.   41 yo male with PMH of bladder atrophy (multiple surgeries), cystoscopies, prostitis, self-catheterizes since birth  presents for PAST in preparation for cystoscopy scheduled on 11/11/22 at [facility] to be done by Dr Steiner (pediatric urologist). Pt is currently treated with Augmentin for reoccuring UTI.  Pt complains of chronic frequent suprapubic pain. pt is scheduled for serveillence cystoscopy.  Denies any chest pain, difficulty breathing, SOB, palpitations, dysuria, hematuria, URI, or any other infections in the last 2 weeks/1 month. Denies any recent travel, contact, or exposure to any persons with known or suspected COVID-19. Pt also denies COVID testing within the last 2 weeks. Pt advised to self quarantine until day of procedure. Exercise tolerance of 2-3 flights of stairs without dyspnea. ELSY reviewed with patient.    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  NO--Prior Anesthesia Complication  YES--Latex Allergy, rash  NO--Loose teeth  NO--History of Rheumatoid Arthritis  NO--ELSY  NO--Bleeding risk  NO--Other   written and verbal instructions with teach back on chlorhexidine shampoo provided,  pt verbalized understanding with returned demonstration  Patient verbalized understanding of instructions and was given the opportunity to ask questions and have them answered.

## 2022-11-03 NOTE — H&P PST ADULT - NS MD HP INPLANTS MED DEV
Pt screaming and shouting at this RN and ED tech at bedside, stating \" I want to suck some ganga and I wanna get fucked.\"     Multiple attempts to jump out of bed, ripping off gowns, and throwing blood soaked underwear at staff.     RN unable to redirect. Dr. Crump notified    None

## 2022-11-03 NOTE — H&P PST ADULT - NSICDXPASTSURGICALHX_GEN_ALL_CORE_FT
PAST SURGICAL HISTORY:  H/O arthroscopy of left knee    H/O cystoscopy multiple    History of bladder surgery multiple bladder reconstruction surgery [id number]    History of colon resection 1980's    S/P small bowel resection 1999, 2010

## 2022-11-03 NOTE — H&P PST ADULT - MEDICATION HERBAL REMEDIES, PROFILE
"Chief Complaint   Patient presents with     Chest Pain       Initial /74   Pulse 99   Ht 1.778 m (5' 10\")   Wt 89.8 kg (198 lb)   SpO2 98%   BMI 28.41 kg/m   Estimated body mass index is 28.41 kg/m  as calculated from the following:    Height as of this encounter: 1.778 m (5' 10\").    Weight as of this encounter: 89.8 kg (198 lb).  Medication Reconciliation: complete  Agueda May LPN  " no

## 2022-11-10 ENCOUNTER — TRANSCRIPTION ENCOUNTER (OUTPATIENT)
Age: 42
End: 2022-11-10

## 2022-11-10 NOTE — ASU PATIENT PROFILE, ADULT - FALL HARM RISK - UNIVERSAL INTERVENTIONS
Bed in lowest position, wheels locked, appropriate side rails in place/Call bell, personal items and telephone in reach/Instruct patient to call for assistance before getting out of bed or chair/Non-slip footwear when patient is out of bed/[place] to call system/Physically safe environment - no spills, clutter or unnecessary equipment/Purposeful Proactive Rounding/Room/bathroom lighting operational, light cord in reach

## 2022-11-10 NOTE — ASU PATIENT PROFILE, ADULT - NSICDXPASTSURGICALHX_GEN_ALL_CORE_FT
PAST SURGICAL HISTORY:  H/O arthroscopy of left knee    H/O cystoscopy multiple    History of bladder surgery multiple bladder reconstruction surgery [id number]    History of colon resection 1980's    S/P small bowel resection 1999, 2010

## 2022-11-11 ENCOUNTER — TRANSCRIPTION ENCOUNTER (OUTPATIENT)
Age: 42
End: 2022-11-11

## 2022-11-11 ENCOUNTER — APPOINTMENT (OUTPATIENT)
Dept: PEDIATRIC UROLOGY | Facility: HOSPITAL | Age: 42
End: 2022-11-11

## 2022-11-11 ENCOUNTER — OUTPATIENT (OUTPATIENT)
Dept: OUTPATIENT SERVICES | Facility: HOSPITAL | Age: 42
LOS: 1 days | Discharge: HOME | End: 2022-11-11

## 2022-11-11 VITALS
RESPIRATION RATE: 18 BRPM | TEMPERATURE: 99 F | HEIGHT: 68 IN | SYSTOLIC BLOOD PRESSURE: 112 MMHG | WEIGHT: 139.99 LBS | HEART RATE: 80 BPM | DIASTOLIC BLOOD PRESSURE: 70 MMHG

## 2022-11-11 VITALS
SYSTOLIC BLOOD PRESSURE: 118 MMHG | RESPIRATION RATE: 18 BRPM | HEART RATE: 63 BPM | OXYGEN SATURATION: 99 % | DIASTOLIC BLOOD PRESSURE: 72 MMHG

## 2022-11-11 DIAGNOSIS — Z98.890 OTHER SPECIFIED POSTPROCEDURAL STATES: Chronic | ICD-10-CM

## 2022-11-11 DIAGNOSIS — Z90.49 ACQUIRED ABSENCE OF OTHER SPECIFIED PARTS OF DIGESTIVE TRACT: Chronic | ICD-10-CM

## 2022-11-11 DIAGNOSIS — N36.9 URETHRAL DISORDER, UNSPECIFIED: ICD-10-CM

## 2022-11-11 PROCEDURE — 52214 CYSTOSCOPY AND TREATMENT: CPT

## 2022-11-11 DEVICE — LASER FIBER FLEXIVA 365 ID
Type: IMPLANTABLE DEVICE | Status: NON-FUNCTIONAL
Removed: 2022-11-11

## 2022-11-11 DEVICE — STENT URETH FIRLIT KLUG 8FRX30CM
Type: IMPLANTABLE DEVICE | Status: NON-FUNCTIONAL
Removed: 2022-11-11

## 2022-11-11 RX ORDER — FENTANYL CITRATE 50 UG/ML
50 INJECTION INTRAVENOUS
Refills: 0 | Status: DISCONTINUED | OUTPATIENT
Start: 2022-11-11 | End: 2022-11-11

## 2022-11-11 RX ORDER — SODIUM CHLORIDE 9 MG/ML
1000 INJECTION, SOLUTION INTRAVENOUS
Refills: 0 | Status: DISCONTINUED | OUTPATIENT
Start: 2022-11-11 | End: 2022-11-25

## 2022-11-11 RX ORDER — ONDANSETRON 8 MG/1
4 TABLET, FILM COATED ORAL ONCE
Refills: 0 | Status: COMPLETED | OUTPATIENT
Start: 2022-11-11 | End: 2022-11-11

## 2022-11-11 RX ORDER — OXYCODONE HYDROCHLORIDE 5 MG/1
5 TABLET ORAL ONCE
Refills: 0 | Status: DISCONTINUED | OUTPATIENT
Start: 2022-11-11 | End: 2022-11-11

## 2022-11-11 RX ADMIN — OXYCODONE HYDROCHLORIDE 5 MILLIGRAM(S): 5 TABLET ORAL at 10:10

## 2022-11-11 RX ADMIN — FENTANYL CITRATE 50 MICROGRAM(S): 50 INJECTION INTRAVENOUS at 09:50

## 2022-11-11 RX ADMIN — FENTANYL CITRATE 50 MICROGRAM(S): 50 INJECTION INTRAVENOUS at 09:38

## 2022-11-11 RX ADMIN — OXYCODONE HYDROCHLORIDE 5 MILLIGRAM(S): 5 TABLET ORAL at 10:30

## 2022-11-11 RX ADMIN — ONDANSETRON 4 MILLIGRAM(S): 8 TABLET, FILM COATED ORAL at 09:41

## 2022-11-11 NOTE — ASU DISCHARGE PLAN (ADULT/PEDIATRIC) - NURSING INSTRUCTIONS
Watch for signs of infection; redness, swelling, fever, chills or heat, report such symptoms to the MD. Drink 6-8 glasses of fluids daily to promote hydration. No heavy lifting, pulling or pushing heavy objects. Follow up with surgical MD. Follow MD instructions regarding  catheter care.

## 2022-11-11 NOTE — ASU DISCHARGE PLAN (ADULT/PEDIATRIC) - CARE PROVIDER_API CALL
Tomasz Steiner)  Pediatrics Urology  [phone] [place] [address] 4th [place] [address]  Phone: [phone]  Fax: [phone]  Follow Up Time:

## 2022-11-11 NOTE — ASU DISCHARGE PLAN (ADULT/PEDIATRIC) - NS MD DC FALL RISK RISK
For information on Fall & Injury Prevention, visit: [email or web address] OR  [email or web address] OR  https://www.cdc.gov/steadi/patient.html

## 2022-11-11 NOTE — ASU PREOP CHECKLIST - BSA (M2)
Recommendation Preamble: The following recommendations were made during the visit: Recommendations (Free Text): Recommended moisturizer Detail Level: Zone Render Risk Assessment In Note?: no 1.76

## 2022-11-11 NOTE — ASU DISCHARGE PLAN (ADULT/PEDIATRIC) - ASU DC SPECIAL INSTRUCTIONSFT
Keep urethral willis in place for 3 days and then remove as instructed.  Resume CIC in [place] today on your usual schedule.  Follow up for repeat cystoscopy in 4 months.  Call the office if you have fever greater than 101, difficulty catheterizing, pain not relieved with pain medication, nausea/vomiting.

## 2022-12-22 LAB
ALBUMIN SERPL ELPH-MCNC: 5.1 G/DL
ALP BLD-CCNC: 98 U/L
ALT SERPL-CCNC: 15 U/L
ANION GAP SERPL CALC-SCNC: 10 MMOL/L
AST SERPL-CCNC: 17 U/L
BASOPHILS # BLD AUTO: 0.04 K/UL
BASOPHILS NFR BLD AUTO: 0.7 %
BILIRUB SERPL-MCNC: 0.4 MG/DL
BUN SERPL-MCNC: 25 MG/DL
CALCIUM SERPL-MCNC: 9.9 MG/DL
CELIAC DISEASE INTERPRETATION: NORMAL
CELIAC GENE PAIRS PRESENT: YES
CHLORIDE SERPL-SCNC: 102 MMOL/L
CO2 SERPL-SCNC: 29 MMOL/L
CREAT SERPL-MCNC: 1.4 MG/DL
CRP SERPL-MCNC: <3 MG/L
DQ ALPHA 1: NORMAL
DQ BETA 1: NORMAL
EOSINOPHIL # BLD AUTO: 0.07 K/UL
EOSINOPHIL NFR BLD AUTO: 1.3 %
ERYTHROCYTE [SEDIMENTATION RATE] IN BLOOD BY WESTERGREN METHOD: 4 MM/HR
GLUCOSE SERPL-MCNC: 98 MG/DL
HCT VFR BLD CALC: 44 %
HGB BLD-MCNC: 14.3 G/DL
IMM GRANULOCYTES NFR BLD AUTO: 0.6 %
IMMUNOGLOBULIN A (IGA): 86 MG/DL
LYMPHOCYTES # BLD AUTO: 1.74 K/UL
LYMPHOCYTES NFR BLD AUTO: 32.3 %
MAN DIFF?: NORMAL
MCHC RBC-ENTMCNC: 26.5 PG
MCHC RBC-ENTMCNC: 32.5 G/DL
MCV RBC AUTO: 81.5 FL
MONOCYTES # BLD AUTO: 0.41 K/UL
MONOCYTES NFR BLD AUTO: 7.6 %
NEUTROPHILS # BLD AUTO: 3.09 K/UL
NEUTROPHILS NFR BLD AUTO: 57.5 %
PLATELET # BLD AUTO: 321 K/UL
POTASSIUM SERPL-SCNC: 4.9 MMOL/L
PROT SERPL-MCNC: 7.4 G/DL
RBC # BLD: 5.4 M/UL
RBC # FLD: 13.3 %
SODIUM SERPL-SCNC: 141 MMOL/L
WBC # FLD AUTO: 5.38 K/UL

## 2023-04-20 ENCOUNTER — NON-APPOINTMENT (OUTPATIENT)
Age: 43
End: 2023-04-20

## 2023-04-20 DIAGNOSIS — R36.9 URETHRAL DISCHARGE, UNSPECIFIED: ICD-10-CM

## 2023-04-20 DIAGNOSIS — N41.1 CHRONIC PROSTATITIS: ICD-10-CM

## 2023-04-24 ENCOUNTER — OUTPATIENT (OUTPATIENT)
Dept: OUTPATIENT SERVICES | Facility: HOSPITAL | Age: 43
LOS: 1 days | End: 2023-04-24
Payer: COMMERCIAL

## 2023-04-24 VITALS
HEART RATE: 65 BPM | SYSTOLIC BLOOD PRESSURE: 132 MMHG | OXYGEN SATURATION: 100 % | RESPIRATION RATE: 18 BRPM | WEIGHT: 139.99 LBS | HEIGHT: 67 IN | TEMPERATURE: 98 F | DIASTOLIC BLOOD PRESSURE: 74 MMHG

## 2023-04-24 DIAGNOSIS — Z98.890 OTHER SPECIFIED POSTPROCEDURAL STATES: Chronic | ICD-10-CM

## 2023-04-24 DIAGNOSIS — N36.9 URETHRAL DISORDER, UNSPECIFIED: ICD-10-CM

## 2023-04-24 DIAGNOSIS — Z90.49 ACQUIRED ABSENCE OF OTHER SPECIFIED PARTS OF DIGESTIVE TRACT: Chronic | ICD-10-CM

## 2023-04-24 DIAGNOSIS — Z01.818 ENCOUNTER FOR OTHER PREPROCEDURAL EXAMINATION: ICD-10-CM

## 2023-04-24 LAB
ALBUMIN SERPL ELPH-MCNC: 4.8 G/DL — SIGNIFICANT CHANGE UP (ref 3.5–5.2)
ALP SERPL-CCNC: 143 U/L — HIGH (ref 30–115)
ALT FLD-CCNC: 14 U/L — SIGNIFICANT CHANGE UP (ref 0–41)
ANION GAP SERPL CALC-SCNC: 10 MMOL/L — SIGNIFICANT CHANGE UP (ref 7–14)
APTT BLD: 32.5 SEC — SIGNIFICANT CHANGE UP (ref 27–39.2)
AST SERPL-CCNC: 16 U/L — SIGNIFICANT CHANGE UP (ref 0–41)
BASOPHILS # BLD AUTO: 0.04 K/UL — SIGNIFICANT CHANGE UP (ref 0–0.2)
BASOPHILS NFR BLD AUTO: 0.6 % — SIGNIFICANT CHANGE UP (ref 0–1)
BILIRUB SERPL-MCNC: 0.3 MG/DL — SIGNIFICANT CHANGE UP (ref 0.2–1.2)
BUN SERPL-MCNC: 14 MG/DL — SIGNIFICANT CHANGE UP (ref 10–20)
CALCIUM SERPL-MCNC: 9.6 MG/DL — SIGNIFICANT CHANGE UP (ref 8.4–10.5)
CHLORIDE SERPL-SCNC: 104 MMOL/L — SIGNIFICANT CHANGE UP (ref 98–110)
CO2 SERPL-SCNC: 26 MMOL/L — SIGNIFICANT CHANGE UP (ref 17–32)
CREAT SERPL-MCNC: 1.2 MG/DL — SIGNIFICANT CHANGE UP (ref 0.7–1.5)
EGFR: 77 ML/MIN/1.73M2 — SIGNIFICANT CHANGE UP
EOSINOPHIL # BLD AUTO: 0.07 K/UL — SIGNIFICANT CHANGE UP (ref 0–0.7)
EOSINOPHIL NFR BLD AUTO: 1 % — SIGNIFICANT CHANGE UP (ref 0–8)
GLUCOSE SERPL-MCNC: 79 MG/DL — SIGNIFICANT CHANGE UP (ref 70–99)
HCT VFR BLD CALC: 42.2 % — SIGNIFICANT CHANGE UP (ref 42–52)
HGB BLD-MCNC: 13.3 G/DL — LOW (ref 14–18)
IMM GRANULOCYTES NFR BLD AUTO: 0.3 % — SIGNIFICANT CHANGE UP (ref 0.1–0.3)
INR BLD: 0.92 RATIO — SIGNIFICANT CHANGE UP (ref 0.65–1.3)
LYMPHOCYTES # BLD AUTO: 2.2 K/UL — SIGNIFICANT CHANGE UP (ref 1.2–3.4)
LYMPHOCYTES # BLD AUTO: 31.8 % — SIGNIFICANT CHANGE UP (ref 20.5–51.1)
MCHC RBC-ENTMCNC: 25.5 PG — LOW (ref 27–31)
MCHC RBC-ENTMCNC: 31.5 G/DL — LOW (ref 32–37)
MCV RBC AUTO: 80.8 FL — SIGNIFICANT CHANGE UP (ref 80–94)
MONOCYTES # BLD AUTO: 0.56 K/UL — SIGNIFICANT CHANGE UP (ref 0.1–0.6)
MONOCYTES NFR BLD AUTO: 8.1 % — SIGNIFICANT CHANGE UP (ref 1.7–9.3)
NEUTROPHILS # BLD AUTO: 4.03 K/UL — SIGNIFICANT CHANGE UP (ref 1.4–6.5)
NEUTROPHILS NFR BLD AUTO: 58.2 % — SIGNIFICANT CHANGE UP (ref 42.2–75.2)
NRBC # BLD: 0 /100 WBCS — SIGNIFICANT CHANGE UP (ref 0–0)
PLATELET # BLD AUTO: 363 K/UL — SIGNIFICANT CHANGE UP (ref 130–400)
PMV BLD: 10.3 FL — SIGNIFICANT CHANGE UP (ref 7.4–10.4)
POTASSIUM SERPL-MCNC: 4.9 MMOL/L — SIGNIFICANT CHANGE UP (ref 3.5–5)
POTASSIUM SERPL-SCNC: 4.9 MMOL/L — SIGNIFICANT CHANGE UP (ref 3.5–5)
PROT SERPL-MCNC: 7.1 G/DL — SIGNIFICANT CHANGE UP (ref 6–8)
PROTHROM AB SERPL-ACNC: 10.5 SEC — SIGNIFICANT CHANGE UP (ref 9.95–12.87)
RBC # BLD: 5.22 M/UL — SIGNIFICANT CHANGE UP (ref 4.7–6.1)
RBC # FLD: 13.7 % — SIGNIFICANT CHANGE UP (ref 11.5–14.5)
SODIUM SERPL-SCNC: 140 MMOL/L — SIGNIFICANT CHANGE UP (ref 135–146)
URINE CULTURE <10: ABNORMAL
WBC # BLD: 6.92 K/UL — SIGNIFICANT CHANGE UP (ref 4.8–10.8)
WBC # FLD AUTO: 6.92 K/UL — SIGNIFICANT CHANGE UP (ref 4.8–10.8)

## 2023-04-24 PROCEDURE — 36415 COLL VENOUS BLD VENIPUNCTURE: CPT

## 2023-04-24 PROCEDURE — 85025 COMPLETE CBC W/AUTO DIFF WBC: CPT

## 2023-04-24 PROCEDURE — 80053 COMPREHEN METABOLIC PANEL: CPT

## 2023-04-24 PROCEDURE — 99214 OFFICE O/P EST MOD 30 MIN: CPT | Mod: 25

## 2023-04-24 PROCEDURE — 85730 THROMBOPLASTIN TIME PARTIAL: CPT

## 2023-04-24 PROCEDURE — 85610 PROTHROMBIN TIME: CPT

## 2023-04-24 NOTE — H&P PST ADULT - HISTORY OF PRESENT ILLNESS
42 yo male with PMH of bladder atrophy (multiple surgeries), cystoscopies, prostitis, self-catheterizes since birth  presents for PAST in preparation for cystoscopy scheduled on 11/11/22 at [facility] to be done by Dr Steiner (pediatric urologist). Pt is currently treated with Augmentin for reoccuring UTI.    PATIENT CURRENTLY DENIES CHEST PAIN  SHORTNESS OF BREATH  PALPITATIONS,  DYSURIA, OR UPPER RESPIRATORY INFECTION IN PAST 2 WEEKS  EXERCISE  TOLERANCE  1-2 FLIGHT OF STAIRS  WITHOUT SHORTNESS OF BREATH  denies travel outside the [place] in the past 30 days  Patient denies any signs or symptoms of COVID 19 and denies contact with known positive individuals.  They have an appointment for repeat COVID testing pre-procedure and acknowledge its time and place.  They were instructed to quarantine pre-procedure, practice exposure control measures, continue to self-monitor and report any concerns to their proceduralist.  pt advised self quarantine till day of procedure  Anesthesia Alert  NO--Difficult Airway  NO--History of neck surgery or radiation  NO--Limited ROM of neck  NO--History of Malignant hyperthermia  NO--No personal or family history of Pseudocholinesterase deficiency.  NO--Prior Anesthesia Complication  +Latex Allergy  NO--Loose teeth  NO--History of Rheumatoid Arthritis  NO--Bleeding risk  NO--ELSY  NO--Other_____    PT DENIES ANY RASHES, ABRASION, OR OPEN WOUNDS OR CUTS    AS PER THE PT, THIS IS HIS/HER COMPLETE MEDICAL AND SURGICAL HX, INCLUDING MEDICATIONS PRESCRIBED AND OVER THE COUNTER    Patient verbalized understanding of instructions and was given the opportunity to ask questions and have them answered.    pt denies any suicidal ideation or thoughts, pt states not a threat to self or others    Disorder of urethra    Encounter for other preprocedural examination    No pertinent family history in first degree relatives    Bladder disease or syndrome    Eczema    HTN (hypertension)    GERD (gastroesophageal reflux disease)    Recurrent urinary tract infection    H/O prostatitis    History of bladder surgery    History of colon resection    S/P small bowel resection    H/O arthroscopy    H/O cystoscopy    [id number]    SysAdmin_VstLnk

## 2023-04-24 NOTE — H&P PST ADULT - NSICDXPASTSURGICALHX_GEN_ALL_CORE_FT
PAST SURGICAL HISTORY:  H/O arthroscopy of left knee    H/O cystoscopy multiple    History of bladder surgery multiple bladder reconstruction surgery [id number]    History of colon resection 1980's    S/P small bowel resection 1999, 2010

## 2023-04-24 NOTE — H&P PST ADULT - REASON FOR ADMISSION
Patient is a  year old  male presenting to PAST in preparation for cystoscopy   on   5/16/23 under  general anesthesia by Dr. cali

## 2023-04-25 DIAGNOSIS — Z01.818 ENCOUNTER FOR OTHER PREPROCEDURAL EXAMINATION: ICD-10-CM

## 2023-04-25 DIAGNOSIS — N36.9 URETHRAL DISORDER, UNSPECIFIED: ICD-10-CM

## 2023-05-01 LAB — BACTERIA FLD CULT: ABNORMAL

## 2023-05-03 ENCOUNTER — TRANSCRIPTION ENCOUNTER (OUTPATIENT)
Age: 43
End: 2023-05-03

## 2023-05-03 ENCOUNTER — NON-APPOINTMENT (OUTPATIENT)
Age: 43
End: 2023-05-03

## 2023-05-03 NOTE — ASU PATIENT PROFILE, ADULT - NSICDXPASTSURGICALHX_GEN_ALL_CORE_FT
PAST SURGICAL HISTORY:  H/O arthroscopy of left knee    H/O cystoscopy multiple    History of bladder surgery multiple bladder reconstruction surgery [id number]    History of colon resection 1980's    S/P small bowel resection 1999, 2010

## 2023-05-03 NOTE — ASU PATIENT PROFILE, ADULT - NSCAFFEINETYPE_GEN_ALL_CORE_SD
Jhony Chang Patient Age: 38 year old  MESSAGE:   Patient states she has strong urine smell and discharge.  Patient made telephone visit for today at 3:30 with Stacia Artis.  Patient made annual with Silke Bradford for 6-17-20 at 11:00 .  Call connected to call center triage queue.  Routed to Call Center Triage Pool    Message confirmed with caller.      WEIGHT AND HEIGHT:   Wt Readings from Last 1 Encounters:   11/08/17 79.4 kg (175 lb)     Ht Readings from Last 1 Encounters:   08/31/17 5' 8\" (1.727 m)     BMI Readings from Last 1 Encounters:   11/08/17 26.61 kg/m²       ALLERGIES: not on file.  No current outpatient medications on file.     No current facility-administered medications for this visit.      PHARMACY to use: not listed          Pharmacy preference(s) on file: No Pharmacies Listed    CALL BACK INFO: Ok to leave response (including medical information) on answering machine  ROUTING: Patient's physician/staff        PCP: No primary care provider on file.         INS: Payor: BLUE [place] / Plan: PPO [id number] / Product Type: PPO MISC   PATIENT ADDRESS:  [address] [place] [id number]  
Last seen in 2017.  Needs to be seen in office.  Cannot come today.  Appt made for Thursday per pt request.    
coffee

## 2023-05-04 ENCOUNTER — APPOINTMENT (OUTPATIENT)
Dept: PEDIATRIC UROLOGY | Facility: HOSPITAL | Age: 43
End: 2023-05-04

## 2023-05-04 ENCOUNTER — OUTPATIENT (OUTPATIENT)
Dept: OUTPATIENT SERVICES | Facility: HOSPITAL | Age: 43
LOS: 1 days | Discharge: HOME | End: 2023-05-04
Payer: COMMERCIAL

## 2023-05-04 ENCOUNTER — TRANSCRIPTION ENCOUNTER (OUTPATIENT)
Age: 43
End: 2023-05-04

## 2023-05-04 VITALS
HEART RATE: 63 BPM | RESPIRATION RATE: 20 BRPM | TEMPERATURE: 97 F | DIASTOLIC BLOOD PRESSURE: 85 MMHG | SYSTOLIC BLOOD PRESSURE: 125 MMHG | OXYGEN SATURATION: 97 %

## 2023-05-04 VITALS
DIASTOLIC BLOOD PRESSURE: 85 MMHG | RESPIRATION RATE: 16 BRPM | HEIGHT: 67 IN | TEMPERATURE: 98 F | OXYGEN SATURATION: 99 % | HEART RATE: 69 BPM | WEIGHT: 139.99 LBS | SYSTOLIC BLOOD PRESSURE: 133 MMHG

## 2023-05-04 DIAGNOSIS — Z98.890 OTHER SPECIFIED POSTPROCEDURAL STATES: Chronic | ICD-10-CM

## 2023-05-04 DIAGNOSIS — Z90.49 ACQUIRED ABSENCE OF OTHER SPECIFIED PARTS OF DIGESTIVE TRACT: Chronic | ICD-10-CM

## 2023-05-04 DIAGNOSIS — N36.9 URETHRAL DISORDER, UNSPECIFIED: ICD-10-CM

## 2023-05-04 PROCEDURE — 52214 CYSTOSCOPY AND TREATMENT: CPT

## 2023-05-04 DEVICE — LASER FIBER FLEXIVA 365 ID
Type: IMPLANTABLE DEVICE | Status: NON-FUNCTIONAL
Removed: 2023-05-04

## 2023-05-04 RX ORDER — OXYCODONE HYDROCHLORIDE 5 MG/1
5 TABLET ORAL ONCE
Refills: 0 | Status: DISCONTINUED | OUTPATIENT
Start: 2023-05-04 | End: 2023-05-04

## 2023-05-04 RX ORDER — ONDANSETRON 8 MG/1
4 TABLET, FILM COATED ORAL ONCE
Refills: 0 | Status: COMPLETED | OUTPATIENT
Start: 2023-05-04 | End: 2023-05-04

## 2023-05-04 RX ORDER — HYDROMORPHONE HYDROCHLORIDE 2 MG/ML
0.5 INJECTION INTRAMUSCULAR; INTRAVENOUS; SUBCUTANEOUS
Refills: 0 | Status: DISCONTINUED | OUTPATIENT
Start: 2023-05-04 | End: 2023-05-04

## 2023-05-04 RX ADMIN — OXYCODONE HYDROCHLORIDE 5 MILLIGRAM(S): 5 TABLET ORAL at 20:15

## 2023-05-04 RX ADMIN — ONDANSETRON 4 MILLIGRAM(S): 8 TABLET, FILM COATED ORAL at 19:40

## 2023-05-04 RX ADMIN — OXYCODONE HYDROCHLORIDE 5 MILLIGRAM(S): 5 TABLET ORAL at 19:40

## 2023-05-04 NOTE — ASU PREOP CHECKLIST - TYPE OF SOLUTION
Discharge Summary    Patient: Inez Reddy MRN: [id number]  CSN: [id number]    YOB: 1963  Age: 65 y.o. Sex: female    DOA: 3/27/2021 LOS:  LOS: 0 days   Discharge Date: 3/29/2021     Admission Diagnoses:   COPD exacerbation    Discharge Diagnoses:    COPD with acute exacerbation  Acute respiratory failure secondary to above  Hypertensive urgency  Tobacco use/active smoker  Substance abuse, UDS positive for cocaine and opiates  Medical noncompliance      Hospital Course:   See admission H&P for full details of HPI. Patient was admitted to the hospital after presenting to the emergency department for evaluation of worsening shortness of breath. She is well-known to the service and has a known history of poor compliance and substance abuse. On arrival to the emergency department she required initiation of BiPAP due to severe respiratory distress. Respiratory status improved with bronchodilator and IV steroid therapy. Patient began to feel better with regard to her overall respiratory status although she was not felt to be medically stable for discharge. Per usual course, patient began to become very agitated and poorly cooperative with nursing staff and began refusing routine nursing care, medications and respiratory treatments. She left the hospital [address] despite being made aware of the risks of doing so. Raquel Garcia MD  [address] [place], [place] [address]ists    Disclaimer: Sections of this note are dictated using utilizing voice recognition software. Minor typographical errors may be present. If questions arise, please do not hesitate to contact me or call our department.
LR

## 2023-05-04 NOTE — ASU PREOP CHECKLIST - NS PREOP CHK HIBICLENS NA
N/A Dutasteride Pregnancy And Lactation Text: This medication is absolutely contraindicated in women, especially during pregnancy and breast feeding. Feminization of male fetuses is possible if taking while pregnant.

## 2023-05-04 NOTE — CONSULT LETTER
[FreeTextEntry1] : Dr. Clemons\par \par RODRIGO MIR underwent surgery today for cystoscopy and laser ablation of his urethral hair. Please see my note below. Briefly, he underwent an uneventful surgery. Will see him back in approximately 4 months for re-look cystoscopy and possible ablation.\par \par Thank you for allowing me to participate in the care of this patient. Please feel free to contact me with any questions\par \par Tomasz Steiner MD\par [facility]\par Pediatric Urology\par [facility]

## 2023-05-04 NOTE — ASU DISCHARGE PLAN (ADULT/PEDIATRIC) - NS MD DC FALL RISK RISK
For information on Fall & Injury Prevention, visit: [email or web address] OR  [email or web address] OR  https://www.cdc.gov/steadi/patient.html

## 2023-05-04 NOTE — ASU PREOP CHECKLIST - ALLERGIES REVIEWED
done
no dermatitis, no environmental allergies, no food allergies, no immunosuppressive disorder, and no pruritus.

## 2023-05-04 NOTE — ASU DISCHARGE PLAN (ADULT/PEDIATRIC) - ASU DC SPECIAL INSTRUCTIONSFT
Pain control  - Over the counters Tylenol every 6 hours and ibuprofen every 8 hours alternating as needed for pain    Activity  - No restrictions    When to call  - Call if persistent nausea, vomiting, fevers >38C, or shaking chills    Follow up  - 4 months for repeat cystoscopy and ablation

## 2023-05-04 NOTE — ASU DISCHARGE PLAN (ADULT/PEDIATRIC) - NURSING INSTRUCTIONS
DO NOT take any Tylenol (Acetaminophen) or narcotics containing Tylenol until after midnight . You received Tylenol during your operation and it can cause damage to your liver if too much is taken within a 24 hour time period.     DO NOT take any Ibuprofen ,Advil or Aleeve until after midnight . You received Toradol during your operation and it can cause damage if too much is taken within a 24 hour time period.

## 2023-05-04 NOTE — PROCEDURE
[FreeTextEntry1] : Urethral hair [FreeTextEntry2] : Same [FreeTextEntry3] : Cystoscopy, laser depilation [FreeTextEntry5] : None [FreeTextEntry6] : Resume CIC immediately\par Follow up in 4 months for repeat cystoscopy

## 2023-07-26 ENCOUNTER — INPATIENT (INPATIENT)
Facility: HOSPITAL | Age: 43
LOS: 2 days | Discharge: HOME | DRG: 728 | End: 2023-07-29
Attending: UROLOGY | Admitting: UROLOGY
Payer: COMMERCIAL

## 2023-07-26 VITALS
TEMPERATURE: 99 F | WEIGHT: 139.99 LBS | HEART RATE: 109 BPM | DIASTOLIC BLOOD PRESSURE: 100 MMHG | OXYGEN SATURATION: 100 % | HEIGHT: 67 IN | RESPIRATION RATE: 18 BRPM | SYSTOLIC BLOOD PRESSURE: 166 MMHG

## 2023-07-26 DIAGNOSIS — Z98.890 OTHER SPECIFIED POSTPROCEDURAL STATES: Chronic | ICD-10-CM

## 2023-07-26 DIAGNOSIS — Z90.49 ACQUIRED ABSENCE OF OTHER SPECIFIED PARTS OF DIGESTIVE TRACT: Chronic | ICD-10-CM

## 2023-07-26 DIAGNOSIS — R50.9 FEVER, UNSPECIFIED: ICD-10-CM

## 2023-07-26 LAB
ALBUMIN SERPL ELPH-MCNC: 4.7 G/DL — SIGNIFICANT CHANGE UP (ref 3.5–5.2)
ALP SERPL-CCNC: 128 U/L — HIGH (ref 30–115)
ALT FLD-CCNC: 12 U/L — SIGNIFICANT CHANGE UP (ref 0–41)
ANION GAP SERPL CALC-SCNC: 11 MMOL/L — SIGNIFICANT CHANGE UP (ref 7–14)
APPEARANCE UR: CLEAR — SIGNIFICANT CHANGE UP
APTT BLD: 34.7 SEC — SIGNIFICANT CHANGE UP (ref 27–39.2)
AST SERPL-CCNC: 16 U/L — SIGNIFICANT CHANGE UP (ref 0–41)
BASOPHILS # BLD AUTO: 0.03 K/UL — SIGNIFICANT CHANGE UP (ref 0–0.2)
BASOPHILS NFR BLD AUTO: 0.3 % — SIGNIFICANT CHANGE UP (ref 0–1)
BILIRUB SERPL-MCNC: 0.8 MG/DL — SIGNIFICANT CHANGE UP (ref 0.2–1.2)
BILIRUB UR-MCNC: NEGATIVE — SIGNIFICANT CHANGE UP
BUN SERPL-MCNC: 14 MG/DL — SIGNIFICANT CHANGE UP (ref 10–20)
CALCIUM SERPL-MCNC: 9.3 MG/DL — SIGNIFICANT CHANGE UP (ref 8.4–10.5)
CHLORIDE SERPL-SCNC: 101 MMOL/L — SIGNIFICANT CHANGE UP (ref 98–110)
CO2 SERPL-SCNC: 24 MMOL/L — SIGNIFICANT CHANGE UP (ref 17–32)
COLOR SPEC: YELLOW — SIGNIFICANT CHANGE UP
CREAT SERPL-MCNC: 1.1 MG/DL — SIGNIFICANT CHANGE UP (ref 0.7–1.5)
DIFF PNL FLD: NEGATIVE — SIGNIFICANT CHANGE UP
EGFR: 86 ML/MIN/1.73M2 — SIGNIFICANT CHANGE UP
EOSINOPHIL # BLD AUTO: 0.01 K/UL — SIGNIFICANT CHANGE UP (ref 0–0.7)
EOSINOPHIL NFR BLD AUTO: 0.1 % — SIGNIFICANT CHANGE UP (ref 0–8)
GLUCOSE SERPL-MCNC: 87 MG/DL — SIGNIFICANT CHANGE UP (ref 70–99)
GLUCOSE UR QL: NEGATIVE MG/DL — SIGNIFICANT CHANGE UP
HCT VFR BLD CALC: 40.1 % — LOW (ref 42–52)
HGB BLD-MCNC: 13.3 G/DL — LOW (ref 14–18)
IMM GRANULOCYTES NFR BLD AUTO: 0.3 % — SIGNIFICANT CHANGE UP (ref 0.1–0.3)
INR BLD: 0.98 RATIO — SIGNIFICANT CHANGE UP (ref 0.65–1.3)
KETONES UR-MCNC: NEGATIVE — SIGNIFICANT CHANGE UP
LACTATE SERPL-SCNC: 1.2 MMOL/L — SIGNIFICANT CHANGE UP (ref 0.7–2)
LEUKOCYTE ESTERASE UR-ACNC: NEGATIVE — SIGNIFICANT CHANGE UP
LYMPHOCYTES # BLD AUTO: 0.7 K/UL — LOW (ref 1.2–3.4)
LYMPHOCYTES # BLD AUTO: 7.5 % — LOW (ref 20.5–51.1)
MCHC RBC-ENTMCNC: 25.4 PG — LOW (ref 27–31)
MCHC RBC-ENTMCNC: 33.2 G/DL — SIGNIFICANT CHANGE UP (ref 32–37)
MCV RBC AUTO: 76.5 FL — LOW (ref 80–94)
MONOCYTES # BLD AUTO: 0.68 K/UL — HIGH (ref 0.1–0.6)
MONOCYTES NFR BLD AUTO: 7.3 % — SIGNIFICANT CHANGE UP (ref 1.7–9.3)
NEUTROPHILS # BLD AUTO: 7.87 K/UL — HIGH (ref 1.4–6.5)
NEUTROPHILS NFR BLD AUTO: 84.5 % — HIGH (ref 42.2–75.2)
NITRITE UR-MCNC: NEGATIVE — SIGNIFICANT CHANGE UP
NRBC # BLD: 0 /100 WBCS — SIGNIFICANT CHANGE UP (ref 0–0)
PH UR: 6 — SIGNIFICANT CHANGE UP (ref 5–8)
PLATELET # BLD AUTO: 289 K/UL — SIGNIFICANT CHANGE UP (ref 130–400)
PMV BLD: 9.7 FL — SIGNIFICANT CHANGE UP (ref 7.4–10.4)
POTASSIUM SERPL-MCNC: 4.4 MMOL/L — SIGNIFICANT CHANGE UP (ref 3.5–5)
POTASSIUM SERPL-SCNC: 4.4 MMOL/L — SIGNIFICANT CHANGE UP (ref 3.5–5)
PROT SERPL-MCNC: 6.9 G/DL — SIGNIFICANT CHANGE UP (ref 6–8)
PROT UR-MCNC: NEGATIVE MG/DL — SIGNIFICANT CHANGE UP
PROTHROM AB SERPL-ACNC: 11.2 SEC — SIGNIFICANT CHANGE UP (ref 9.95–12.87)
RBC # BLD: 5.24 M/UL — SIGNIFICANT CHANGE UP (ref 4.7–6.1)
RBC # FLD: 13.4 % — SIGNIFICANT CHANGE UP (ref 11.5–14.5)
SODIUM SERPL-SCNC: 136 MMOL/L — SIGNIFICANT CHANGE UP (ref 135–146)
SP GR SPEC: 1.01 — SIGNIFICANT CHANGE UP (ref 1.01–1.03)
UROBILINOGEN FLD QL: 0.2 MG/DL — SIGNIFICANT CHANGE UP
WBC # BLD: 9.32 K/UL — SIGNIFICANT CHANGE UP (ref 4.8–10.8)
WBC # FLD AUTO: 9.32 K/UL — SIGNIFICANT CHANGE UP (ref 4.8–10.8)

## 2023-07-26 PROCEDURE — 87070 CULTURE OTHR SPECIMN AEROBIC: CPT

## 2023-07-26 PROCEDURE — 87205 SMEAR GRAM STAIN: CPT

## 2023-07-26 PROCEDURE — 36415 COLL VENOUS BLD VENIPUNCTURE: CPT

## 2023-07-26 PROCEDURE — 83735 ASSAY OF MAGNESIUM: CPT

## 2023-07-26 PROCEDURE — 99285 EMERGENCY DEPT VISIT HI MDM: CPT

## 2023-07-26 PROCEDURE — 93010 ELECTROCARDIOGRAM REPORT: CPT

## 2023-07-26 PROCEDURE — 74177 CT ABD & PELVIS W/CONTRAST: CPT | Mod: 26,MA

## 2023-07-26 PROCEDURE — 80048 BASIC METABOLIC PNL TOTAL CA: CPT

## 2023-07-26 PROCEDURE — 85027 COMPLETE CBC AUTOMATED: CPT

## 2023-07-26 PROCEDURE — 87077 CULTURE AEROBIC IDENTIFY: CPT

## 2023-07-26 PROCEDURE — 80053 COMPREHEN METABOLIC PANEL: CPT

## 2023-07-26 RX ORDER — MEROPENEM 1 G/30ML
1000 INJECTION INTRAVENOUS ONCE
Refills: 0 | Status: COMPLETED | OUTPATIENT
Start: 2023-07-26 | End: 2023-07-26

## 2023-07-26 RX ORDER — SODIUM CHLORIDE 9 MG/ML
2000 INJECTION, SOLUTION INTRAVENOUS ONCE
Refills: 0 | Status: COMPLETED | OUTPATIENT
Start: 2023-07-26 | End: 2023-07-26

## 2023-07-26 RX ADMIN — MEROPENEM 100 MILLIGRAM(S): 1 INJECTION INTRAVENOUS at 19:55

## 2023-07-26 RX ADMIN — SODIUM CHLORIDE 2000 MILLILITER(S): 9 INJECTION, SOLUTION INTRAVENOUS at 19:53

## 2023-07-26 RX ADMIN — Medication 1 MILLIGRAM(S): at 19:55

## 2023-07-26 NOTE — ED PROVIDER NOTE - NSICDXPASTSURGICALHX_GEN_ALL_CORE_FT
PAST SURGICAL HISTORY:  H/O arthroscopy of left knee    H/O cystoscopy multiple    History of bladder surgery multiple bladder reconstruction surgery [id number]    History of colon resection 1980's    S/P small bowel resection 1999, 2010

## 2023-07-26 NOTE — ED ADULT TRIAGE NOTE - CHIEF COMPLAINT QUOTE
Pt c/o suprapubic pain since Monday and urethral discharge; HX prostatitis; had fever earlier today' took Cipro at 1600.

## 2023-07-26 NOTE — ED PROVIDER NOTE - OBJECTIVE STATEMENT
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 Pt states he had 101.6F temp yesterday with 10/10 pain to his suprapubic region/base of penis area radiating to his rectum with reproducible white milky discharge from urethra. Pt was unable to straighten his legs due to the pain which has now improved since given ED meds. States he feels better pain now 5/10. States he is treated by urologist Dr Steiner who has performed multiple urological surgeries on pt for many years. Denies CP, SOB, abd pain, HA.

## 2023-07-26 NOTE — ED PROVIDER NOTE - CLINICAL SUMMARY MEDICAL DECISION MAKING FREE TEXT BOX
43 year old male with a PMHx of bladder exstrophy s/p multiple bladder resections, CIC via abdominal stoma, now s/p stomal cystoscopy and laser epilation of urethral hair on 4/7/2022 by Dr. Steiner. chronic prostatit s-  usually enterococcus that he gest treated with AUgmentin  Pt presents with  fever today  . finished  2 week course of abx 1 week ago .  today with fever and purulent discharge  labs wnl CT  no acute pathology. pt admitted to Dr Nguyễn

## 2023-07-26 NOTE — ED ADULT NURSE NOTE - NSICDXPASTSURGICALHX_GEN_ALL_CORE_FT
PAST SURGICAL HISTORY:  H/O arthroscopy of left knee    H/O cystoscopy multiple    History of bladder surgery multiple bladder reconstruction surgery [id number]    History of colon resection 1980's    S/P small bowel resection 1999, 2010

## 2023-07-26 NOTE — ED ADULT NURSE NOTE - NSFALLUNIVINTERV_ED_ALL_ED
Bed/Stretcher in lowest position, wheels locked, appropriate side rails in place/Call bell, personal items and telephone in reach/Instruct patient to call for assistance before getting out of bed/chair/stretcher/Non-slip footwear applied when patient is off stretcher/[place] to call system/Physically safe environment - no spills, clutter or unnecessary equipment/Purposeful proactive rounding/Room/bathroom lighting operational, light cord in reach

## 2023-07-27 LAB
ALBUMIN SERPL ELPH-MCNC: 4 G/DL — SIGNIFICANT CHANGE UP (ref 3.5–5.2)
ALP SERPL-CCNC: 113 U/L — SIGNIFICANT CHANGE UP (ref 30–115)
ALT FLD-CCNC: 10 U/L — SIGNIFICANT CHANGE UP (ref 0–41)
ANION GAP SERPL CALC-SCNC: 10 MMOL/L — SIGNIFICANT CHANGE UP (ref 7–14)
AST SERPL-CCNC: 12 U/L — SIGNIFICANT CHANGE UP (ref 0–41)
BILIRUB SERPL-MCNC: 1 MG/DL — SIGNIFICANT CHANGE UP (ref 0.2–1.2)
BUN SERPL-MCNC: 11 MG/DL — SIGNIFICANT CHANGE UP (ref 10–20)
CALCIUM SERPL-MCNC: 8.8 MG/DL — SIGNIFICANT CHANGE UP (ref 8.4–10.5)
CHLORIDE SERPL-SCNC: 102 MMOL/L — SIGNIFICANT CHANGE UP (ref 98–110)
CO2 SERPL-SCNC: 26 MMOL/L — SIGNIFICANT CHANGE UP (ref 17–32)
CREAT SERPL-MCNC: 1 MG/DL — SIGNIFICANT CHANGE UP (ref 0.7–1.5)
CULTURE RESULTS: SIGNIFICANT CHANGE UP
EGFR: 96 ML/MIN/1.73M2 — SIGNIFICANT CHANGE UP
GLUCOSE SERPL-MCNC: 100 MG/DL — HIGH (ref 70–99)
HCT VFR BLD CALC: 39.8 % — LOW (ref 42–52)
HGB BLD-MCNC: 12.9 G/DL — LOW (ref 14–18)
MCHC RBC-ENTMCNC: 25.2 PG — LOW (ref 27–31)
MCHC RBC-ENTMCNC: 32.4 G/DL — SIGNIFICANT CHANGE UP (ref 32–37)
MCV RBC AUTO: 77.7 FL — LOW (ref 80–94)
NRBC # BLD: 0 /100 WBCS — SIGNIFICANT CHANGE UP (ref 0–0)
PLATELET # BLD AUTO: 298 K/UL — SIGNIFICANT CHANGE UP (ref 130–400)
PMV BLD: 9.9 FL — SIGNIFICANT CHANGE UP (ref 7.4–10.4)
POTASSIUM SERPL-MCNC: 4.3 MMOL/L — SIGNIFICANT CHANGE UP (ref 3.5–5)
POTASSIUM SERPL-SCNC: 4.3 MMOL/L — SIGNIFICANT CHANGE UP (ref 3.5–5)
PROT SERPL-MCNC: 5.9 G/DL — LOW (ref 6–8)
RBC # BLD: 5.12 M/UL — SIGNIFICANT CHANGE UP (ref 4.7–6.1)
RBC # FLD: 13.5 % — SIGNIFICANT CHANGE UP (ref 11.5–14.5)
SODIUM SERPL-SCNC: 138 MMOL/L — SIGNIFICANT CHANGE UP (ref 135–146)
SPECIMEN SOURCE: SIGNIFICANT CHANGE UP
WBC # BLD: 11.86 K/UL — HIGH (ref 4.8–10.8)
WBC # FLD AUTO: 11.86 K/UL — HIGH (ref 4.8–10.8)

## 2023-07-27 PROCEDURE — 99221 1ST HOSP IP/OBS SF/LOW 40: CPT

## 2023-07-27 RX ORDER — MEROPENEM 1 G/30ML
1000 INJECTION INTRAVENOUS EVERY 8 HOURS
Refills: 0 | Status: DISCONTINUED | OUTPATIENT
Start: 2023-07-27 | End: 2023-07-27

## 2023-07-27 RX ORDER — AMPICILLIN SODIUM AND SULBACTAM SODIUM 250; 125 MG/ML; MG/ML
3 INJECTION, POWDER, FOR SUSPENSION INTRAMUSCULAR; INTRAVENOUS ONCE
Refills: 0 | Status: COMPLETED | OUTPATIENT
Start: 2023-07-27 | End: 2023-07-27

## 2023-07-27 RX ORDER — SODIUM CHLORIDE 9 MG/ML
1000 INJECTION INTRAMUSCULAR; INTRAVENOUS; SUBCUTANEOUS
Refills: 0 | Status: DISCONTINUED | OUTPATIENT
Start: 2023-07-27 | End: 2023-07-29

## 2023-07-27 RX ORDER — AMPICILLIN SODIUM AND SULBACTAM SODIUM 250; 125 MG/ML; MG/ML
3 INJECTION, POWDER, FOR SUSPENSION INTRAMUSCULAR; INTRAVENOUS EVERY 6 HOURS
Refills: 0 | Status: DISCONTINUED | OUTPATIENT
Start: 2023-07-27 | End: 2023-07-29

## 2023-07-27 RX ORDER — ESOMEPRAZOLE MAGNESIUM 40 MG/1
1 CAPSULE, DELAYED RELEASE ORAL
Qty: 0 | Refills: 0 | DISCHARGE

## 2023-07-27 RX ORDER — MEROPENEM 1 G/30ML
INJECTION INTRAVENOUS
Refills: 0 | Status: DISCONTINUED | OUTPATIENT
Start: 2023-07-27 | End: 2023-07-27

## 2023-07-27 RX ORDER — VALSARTAN 80 MG/1
80 TABLET ORAL DAILY
Refills: 0 | Status: DISCONTINUED | OUTPATIENT
Start: 2023-07-27 | End: 2023-07-29

## 2023-07-27 RX ORDER — KETOROLAC TROMETHAMINE 30 MG/ML
15 SYRINGE (ML) INJECTION EVERY 4 HOURS
Refills: 0 | Status: DISCONTINUED | OUTPATIENT
Start: 2023-07-27 | End: 2023-07-29

## 2023-07-27 RX ORDER — LANOLIN ALCOHOL/MO/W.PET/CERES
3 CREAM (GRAM) TOPICAL AT BEDTIME
Refills: 0 | Status: DISCONTINUED | OUTPATIENT
Start: 2023-07-27 | End: 2023-07-29

## 2023-07-27 RX ORDER — MEROPENEM 1 G/30ML
1000 INJECTION INTRAVENOUS ONCE
Refills: 0 | Status: DISCONTINUED | OUTPATIENT
Start: 2023-07-27 | End: 2023-07-27

## 2023-07-27 RX ORDER — ACETAMINOPHEN 500 MG
650 TABLET ORAL EVERY 6 HOURS
Refills: 0 | Status: DISCONTINUED | OUTPATIENT
Start: 2023-07-27 | End: 2023-07-29

## 2023-07-27 RX ORDER — AMPICILLIN SODIUM AND SULBACTAM SODIUM 250; 125 MG/ML; MG/ML
INJECTION, POWDER, FOR SUSPENSION INTRAMUSCULAR; INTRAVENOUS
Refills: 0 | Status: DISCONTINUED | OUTPATIENT
Start: 2023-07-27 | End: 2023-07-29

## 2023-07-27 RX ORDER — CIPROFLOXACIN LACTATE 400MG/40ML
400 VIAL (ML) INTRAVENOUS EVERY 12 HOURS
Refills: 0 | Status: DISCONTINUED | OUTPATIENT
Start: 2023-07-27 | End: 2023-07-29

## 2023-07-27 RX ORDER — KETOROLAC TROMETHAMINE 30 MG/ML
30 SYRINGE (ML) INJECTION EVERY 6 HOURS
Refills: 0 | Status: DISCONTINUED | OUTPATIENT
Start: 2023-07-27 | End: 2023-07-29

## 2023-07-27 RX ORDER — ONDANSETRON 8 MG/1
4 TABLET, FILM COATED ORAL EVERY 8 HOURS
Refills: 0 | Status: DISCONTINUED | OUTPATIENT
Start: 2023-07-27 | End: 2023-07-29

## 2023-07-27 RX ORDER — PANTOPRAZOLE SODIUM 20 MG/1
40 TABLET, DELAYED RELEASE ORAL EVERY 12 HOURS
Refills: 0 | Status: DISCONTINUED | OUTPATIENT
Start: 2023-07-27 | End: 2023-07-29

## 2023-07-27 RX ADMIN — Medication 1 MILLIGRAM(S): at 18:00

## 2023-07-27 RX ADMIN — Medication 1 MILLIGRAM(S): at 00:15

## 2023-07-27 RX ADMIN — Medication 1 MILLIGRAM(S): at 23:55

## 2023-07-27 RX ADMIN — Medication 30 MILLIGRAM(S): at 18:13

## 2023-07-27 RX ADMIN — AMPICILLIN SODIUM AND SULBACTAM SODIUM 200 GRAM(S): 250; 125 INJECTION, POWDER, FOR SUSPENSION INTRAMUSCULAR; INTRAVENOUS at 18:12

## 2023-07-27 RX ADMIN — Medication 15 MILLIGRAM(S): at 05:22

## 2023-07-27 RX ADMIN — SODIUM CHLORIDE 75 MILLILITER(S): 9 INJECTION INTRAMUSCULAR; INTRAVENOUS; SUBCUTANEOUS at 05:22

## 2023-07-27 RX ADMIN — Medication 30 MILLIGRAM(S): at 23:56

## 2023-07-27 RX ADMIN — AMPICILLIN SODIUM AND SULBACTAM SODIUM 200 GRAM(S): 250; 125 INJECTION, POWDER, FOR SUSPENSION INTRAMUSCULAR; INTRAVENOUS at 23:46

## 2023-07-27 RX ADMIN — Medication 200 MILLIGRAM(S): at 18:12

## 2023-07-27 RX ADMIN — Medication 15 MILLIGRAM(S): at 11:22

## 2023-07-27 RX ADMIN — AMPICILLIN SODIUM AND SULBACTAM SODIUM 200 GRAM(S): 250; 125 INJECTION, POWDER, FOR SUSPENSION INTRAMUSCULAR; INTRAVENOUS at 12:50

## 2023-07-27 RX ADMIN — Medication 1 MILLIGRAM(S): at 05:26

## 2023-07-27 RX ADMIN — PANTOPRAZOLE SODIUM 40 MILLIGRAM(S): 20 TABLET, DELAYED RELEASE ORAL at 18:13

## 2023-07-27 RX ADMIN — PANTOPRAZOLE SODIUM 40 MILLIGRAM(S): 20 TABLET, DELAYED RELEASE ORAL at 05:22

## 2023-07-27 RX ADMIN — VALSARTAN 80 MILLIGRAM(S): 80 TABLET ORAL at 05:30

## 2023-07-27 NOTE — H&P ADULT - NSHPPHYSICALEXAM_GEN_ALL_CORE
VITALS:   T(C): 36.5 (07-26-23 @ 22:55), Max: 37.1 (07-26-23 @ 19:19)  HR: 99 (07-26-23 @ 21:09) (99 - 109)  BP: 130/78 (07-26-23 @ 21:09) (130/78 - 166/100)  RR: 18 (07-26-23 @ 21:09) (18 - 18)  SpO2: 100% (07-26-23 @ 21:09) (100% - 100%)    GENERAL: NAD, lying in bed comfortably and pleasant  HEAD:  Atraumatic, Normocephalic  EYES: EOMI, conjunctiva and sclera clear  ENT: Moist mucous membranes  NECK: Supple, No JVD  CHEST/LUNG: CTA b/l,  Unlabored respirations  HEART: Regular rate and rhythm; No murmurs, rubs, or gallops  ABDOMEN: Bowel sounds present; Soft, Nontender, Nondistended, ols surgical scars   : + SPT, + nodule at base of penis,   EXTREMITIES:  No clubbing, cyanosis, or edema  NERVOUS SYSTEM:  Alert & Oriented X3, speech clear. No deficits   MSK: FROM all 4 extremities, full and equal strength  SKIN: No rashes or lesions VITALS:   T(C): 36.5 (07-26-23 @ 22:55), Max: 37.1 (07-26-23 @ 19:19)  HR: 99 (07-26-23 @ 21:09) (99 - 109)  BP: 130/78 (07-26-23 @ 21:09) (130/78 - 166/100)  RR: 18 (07-26-23 @ 21:09) (18 - 18)  SpO2: 100% (07-26-23 @ 21:09) (100% - 100%)    GENERAL: NAD, lying in bed comfortably and pleasant  HEAD:  Atraumatic, Normocephalic  EYES: EOMI, conjunctiva and sclera clear  ENT: Moist mucous membranes  NECK: Supple, No JVD  CHEST/LUNG: CTA b/l,  Unlabored respirations  HEART: Regular rate and rhythm; No murmurs, rubs, or gallops  ABDOMEN: Bowel sounds present; Soft, Nontender, Nondistended, ols surgical scars   : + SPT, + nodule at base of penis, external drainage catheter entering the right abdominal wall  EXTREMITIES:  No clubbing, cyanosis, or edema  NERVOUS SYSTEM:  Alert & Oriented X3, speech clear. No deficits   MSK: FROM all 4 extremities, full and equal strength  SKIN: No rashes or lesions

## 2023-07-27 NOTE — H&P ADULT - NS ATTEND AMEND GEN_ALL_CORE FT
Pt interviewed, examined, ct reviewed.  Has pelvic pain.  UA negative, CT negative for collection, has pus vis urethra when pressing on proximal penile area.  Likely urethritis/prostatitis.  continue meropenem and await culture of urethral discharg.  ID consult

## 2023-07-27 NOTE — H&P ADULT - ASSESSMENT
# Fever  # Urethral discharge 42 y/o M w/PMHx of  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 Pt states he pyw251.6F temp yesterday with 10/10 pain to his suprapubic region/base of penis area radiating to his rectum with reproducible white milky discharge from urethra.      # Fever  # Prostatitis   # Urethral discharge  - WBC WNL, afebrile, lactate neg  - c/w IVAB  - ID consult  - pain control  - monitor VS  - tylenol for pyrexia   - f/u urethral discharge culture   - f/u Ucx  - f/u Bcx  - am labs   42 y/o M w/PMHx of  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 Pt states he ood919.6F temp yesterday with 10/10 pain to his suprapubic region/base of penis area radiating to his rectum with reproducible white milky discharge from urethra.      # Fever  # Prostatitis   # Urethral discharge  - WBC WNL, afebrile, lactate neg  - c/w IVAB  - ID consult  - pain control  - monitor VS  - tylenol for pyrexia   - f/u urethral discharge culture   - f/u Ucx  - f/u Bcx  - am labs    # HTN  - c/w valsartan    # GERD  - protonix for omeprazole

## 2023-07-27 NOTE — CONSULT NOTE ADULT - ASSESSMENT
ASSESSMENT  43y M admitted with Fever due to unspecified condition      Bladder disease or syndrome    Eczema    HTN (hypertension)    GERD (gastroesophageal reflux disease)    Recurrent urinary tract infection    H/O prostatitis        IMPRESSION      RECOMMENDATIONS  This is an incomplete consult note. All final recommendations to follow after interview and examination of the patient. Please follow recommendations noted below.    If any questions, please send a message or call on [employer]  Please continue to update ID with any pertinent new laboratory or radiographic findings.    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   CT abdomen does not show any fluid collections or abscess.    IMPRESSION  # Unclear cause of pain- Probable bladder muscle spasm?  # UA normal. Ingrown hair on the shaft of penis, vs urethral diverticuli?  # CT abdomen- no prostate abscess, unchanged bilateral mild caliectasis/hydroureteronephrosis.   # Fever episode  #History of recurrent UTI   # Urethral drainage    RECOMMENDATIONS  -Follow up with the blood cultures, urine and urethral cultures  -Stop Meropenem. Will keep on IV ciprofloxacin 400mg BID (aware of allergies) and Unasyn 3 gram q 6 hours.   -Will consider obtaining MRI of the abdomen/ pelvis if symptoms do not resolve to further elucidate the possible cause given complicated surgical history.  - Duration of treatment to be determined.   - Continue to hold methanamine while being managed for possible infection.  - Cultures positive for E.feacalis, will consider adding susceptibilities for fosfomycin- to be used for PPX.    If any questions, please send a message or call on [employer] Teams  Please continue to update ID with any pertinent new laboratory or radiographic findings.    Darcie Brooks M.D  Infectious Diseases Attending  [facility]

## 2023-07-27 NOTE — H&P ADULT - NS ATTEND OPT1 GEN_ALL_CORE
I attest my time as attending is greater than 50% of the total combined time spent on qualifying patient care activities by the PA/NP and attending.

## 2023-07-27 NOTE — CONSULT NOTE ADULT - SUBJECTIVE AND OBJECTIVE BOX
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 Pt states he qep191.6F temp yesterday with 10/10 pain to his suprapubic region/base of penis area radiating to his rectum with reproducible white milky discharge from urethra. Pt was unable to straighten his legs due to the pain which has now improved since given ED meds. States he feels better pain now 5/10. States he is treated by urologist Dr Steiner who has performed multiple urological surgeries on pt for many years. Denies CP, SOB, abd pain, HA.  (27 Jul 2023 00:39)      INFECTIOUS DISEASE HISTORY:  Hospital course-  ID consulted for     Prior hospital charts reviewed [Yes]  Primary team notes reviewed [Yes]  Other consultant notes reviewed [Yes]    REVIEW OF SYSTEMS:  CONSTITUTIONAL: No fever or chills  HEENT: No sore throat  RESPIRATORY: No cough, no shortness of breath  CARDIOVASCULAR: No chest pain or palpitations  GASTROINTESTINAL: No abdominal or epigastric pain  GENITOURINARY: No dysuria  NEUROLOGICAL: No headache/dizziness  MSK: No joint pain, erythema, or swelling; no back pain  SKIN: No itching, rashes  All other ROS negative except noted above    PAST MEDICAL & SURGICAL HISTORY:  Bladder disease or syndrome  congenital bladder exstrophy      Eczema      HTN (hypertension)      GERD (gastroesophageal reflux disease)      Recurrent urinary tract infection      H/O prostatitis      History of bladder surgery  multiple bladder reconstruction surgery [id number]      History of colon resection  1980's      S/P small bowel resection  1999, 2010      H/O arthroscopy  of left knee      H/O cystoscopy  multiple          SOCIAL HISTORY:  - Born in [place], migrated [place] in 19XX  - Currently working as / Retired  - Lives with _____; no pets  - No recent travel  - Denies tobacco use, alcohol use, illicit drug use  - Currently sexually active, has one male/female sexual partner    FAMILY HISTORY:  No pertinent family history in first degree relatives        Allergy: 1d    ANTIMICROBIALS:      ANTIMICROBIALS (past 90 days):  MEDICATIONS  (STANDING):  meropenem  IVPB   100 mL/Hr IV Intermittent (07-26-23 @ 19:55)        OTHER MEDS:   MEDICATIONS  (STANDING):  acetaminophen     Tablet .. 650 every 6 hours PRN  aluminum hydroxide/magnesium hydroxide/simethicone Suspension 30 every 4 hours PRN  ketorolac   Injectable 15 every 4 hours PRN  ketorolac   Injectable 30 every 6 hours PRN  melatonin 3 at bedtime PRN  ondansetron Injectable 4 every 8 hours PRN  pantoprazole    Tablet 40 every 12 hours  valsartan 80 daily      VITALS:  Vital Signs Last 24 Hrs  T(F): 99.1 (07-27-23 @ 05:07), Max: 99.1 (07-27-23 @ 05:07)    Vital Signs Last 24 Hrs  HR: 109 (07-27-23 @ 05:07) (99 - 109)  BP: 124/77 (07-27-23 @ 05:07) (124/77 - 166/100)  RR: 18 (07-27-23 @ 05:07)  SpO2: 97% (07-27-23 @ 05:07) (97% - 100%)  Wt(kg): --    EXAM:  GENERAL: NAD, lying in bed  HEAD: No head lesions  NECK: Supple, nontender to palpation; no JVD  CHEST/LUNG: Clear to auscultation bilaterally  HEART: S1 S2  ABDOMEN: Soft, nontender, nondistended; normoactive bowel sounds  EXTREMITIES: No clubbing, cyanosis, or petal edema  NERVOUS SYSTEM: Alert and oriented to person, time, place and situation, speech clear. No focal deficits   MSK: No joint erythema, swelling or pain  SKIN: No rashes or lesions, no superficial thrombophlebitis    Labs:                        12.9   11.86 )-----------( 298      ( 27 Jul 2023 07:29 )             39.8     07-27    138  |  102  |  11  ----------------------------<  100<H>  4.3   |  26  |  1.0    Ca    8.8      27 Jul 2023 07:29    TPro  5.9<L>  /  Alb  4.0  /  TBili  1.0  /  DBili  x   /  AST  12  /  ALT  10  /  AlkPhos  113  07-27      WBC Trend:  WBC Count: 11.86 (07-27-23 @ 07:29)  WBC Count: 9.32 (07-26-23 @ 19:50)      Auto Neutrophil #: 7.87 K/uL (07-26-23 @ 19:50)  Auto Neutrophil #: 4.03 K/uL (04-24-23 @ 18:01)  Auto Neutrophil #: 3.45 K/uL (11-03-22 @ 06:40)      Creatine Trend:  Creatinine: 1.0 (07-27)  Creatinine: 1.1 (07-26)      Liver Biochemical Testing Trend:  Alanine Aminotransferase (ALT/SGPT): 10 (07-27)  Alanine Aminotransferase (ALT/SGPT): 12 (07-26)  Alanine Aminotransferase (ALT/SGPT): 14 (04-24)  Alanine Aminotransferase (ALT/SGPT): 17 (11-03)  Aspartate Aminotransferase (AST/SGOT): 12 (07-27-23 @ 07:29)  Aspartate Aminotransferase (AST/SGOT): 16 (07-26-23 @ 19:50)  Aspartate Aminotransferase (AST/SGOT): 16 (04-24-23 @ 18:01)  Aspartate Aminotransferase (AST/SGOT): 18 (11-03-22 @ 06:40)  Bilirubin Total: 1.0 (07-27)  Bilirubin Total: 0.8 (07-26)  Bilirubin Total, Serum: 0.3 (04-24)  Bilirubin Total, Serum: 0.4 (11-03)      Trend LDH      Auto Eosinophil %: 0.1 % (07-26-23 @ 19:50)      Urinalysis Basic - ( 27 Jul 2023 07:29 )    Color: x / Appearance: x / SG: x / pH: x  Gluc: 100 mg/dL / Ketone: x  / Bili: x / Urobili: x   Blood: x / Protein: x / Nitrite: x   Leuk Esterase: x / RBC: x / WBC x   Sq Epi: x / Non Sq Epi: x / Bacteria: x        MICROBIOLOGY:    Male    Lactate, Blood: 1.2 (07-26 @ 19:50)        INFLAMMATORY MARKERS      RADIOLOGY & ADDITIONAL TESTS:  I have personally reviewed the imagings.  CXR      CT  CT Abdomen and Pelvis w/ IV Cont:   ACC: [id number] EXAM:  CT ABDOMEN AND PELVIS IC   ORDERED BY: RODRIGO ROSEN     PROCEDURE DATE:  07/26/2023          INTERPRETATION:  CLINICAL STATEMENT: Fever. Pelvic pain.    TECHNIQUE: Contiguous axial CT images were obtained from the lower chest   to the pubic symphysis following administration of 90 cc Omnipaque 350   intravenous contrast.  Oral contrast was not administered.  Reformatted   images in the coronal and sagittal planes were acquired. 10 cc contrast   discarded    Comparison made with CT abdomen and pelvis 4/19/2022    FINDINGS:    LOWER CHEST: Unremarkable.    HEPATOBILIARY: Unremarkable.    SPLEEN: Unremarkable.    PANCREAS: Unremarkable.    ADRENAL GLANDS: Unremarkable.    KIDNEYS/PELVIC ORGANS:  Symmetric pattern of renal enhancement. Unchanged bilateral mild   caliectasis/hydroureteronephrosis to the level of a decompressed   neobladder without evidence for obstructing calculus. Neobladder   decompressed secondary to a external drainage catheter entering the right   abdominal wall. Prostate gland unchanged. No evidence of prostate abscess.    ABDOMINOPELVIC NODES: Unremarkable.    PERITONEUM/MESENTERY/BOWEL: Unremarkable.    BONES/SOFT TISSUES: Osseous structures unremarkable. No evidence of   pelvic subcutaneous inflammatory stranding.    OTHER: Small bilateral fat-containing hernias.      IMPRESSION:    No evidence of acute abdominal pathology.    Unchanged bilateral mild caliectasis/hydroureteronephrosis to the level   of a decompressed neobladder. No evidence of prostate abscess.    --- End of Report ---            JILL POLANCO MD; Attending Radiologist  This document has been electronically signed. Jul 26 2023  8:45PM (07-26-23 @ 20:32)      CARDIOLOGY TESTING  12 Lead ECG:   Ventricular Rate 94 BPM    Atrial Rate 94 BPM    P-R Interval 152 ms    QRS Duration 78 ms    Q-T Interval 360 ms    QTC Calculation(Bazett) 450 ms    P Axis 50 degrees    R Axis 47 degrees    T Axis 50 degrees    Diagnosis Line Normal sinus rhythm  Normal ECG    Confirmed by SANJEEV ALVAREZ MD ([id number]) on 7/27/2023 6:39:04 AM (07-26-23 @ 20:44)             RODRIGO MIR  43y, Male  Allergies    No Known Drug Allergies  latex (Rash)    Intolerances    Cipro (Other) Insomnia      LOS  1d    HPI  HPI:  44 y/o M w/PMHx of  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 Pt states he yvq658.6F temp yesterday with 10/10 pain to his suprapubic region/base of penis area radiating to his rectum with reproducible white milky discharge from urethra. Pt was unable to straighten his legs due to the pain which has now improved since given ED meds. States he feels better pain now 5/10. States he is treated by urologist Dr Steiner who has performed multiple urological surgeries on pt for many years. Denies CP, SOB, abd pain, HA.  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 He received pain medications and IV antibiotics and ID is being consulted for antimicrobial recommendations.   He has been having intermittent laser surgeries- epilation of urethral hair (complications of the bladder reconstructions) last one approximately 3 months ago.   Reviewed his old culture data for UTI- Heavily colonized with E.feacalis (Amp and Vanc susceptible), rarely citrobacter farmeri (S to Ceftriaxone), one occasion with ESBL E.coli (3/2022- Susceptible to Bactrim) and patient reports history of Acetinobacter Baumanni long time ago in his urine.   He self catheterizes himself from the stoma. Currently denies abdominal pain. He describes that he has been on methanamine for a long time that had significantly reduced his UTI episodes.     Prior hospital charts reviewed [Yes]  Primary team notes reviewed [Yes]  Other consultant notes reviewed [Yes]    REVIEW OF SYSTEMS:  CONSTITUTIONAL: No fever or chills  HEENT: No sore throat  RESPIRATORY: No cough, no shortness of breath  CARDIOVASCULAR: No chest pain or palpitations  GASTROINTESTINAL: No abdominal or epigastric pain  GENITOURINARY: No dysuria  NEUROLOGICAL: No headache/dizziness  MSK: No joint pain, erythema, or swelling; no back pain  SKIN: No itching, rashes  All other ROS negative except noted above    PAST MEDICAL & SURGICAL HISTORY:  Bladder disease or syndrome  congenital bladder exstrophy      Eczema      HTN (hypertension)      GERD (gastroesophageal reflux disease)      Recurrent urinary tract infection      H/O prostatitis      History of bladder surgery  multiple bladder reconstruction surgery [id number]      History of colon resection  1980's      S/P small bowel resection  1999, 2010      H/O arthroscopy  of left knee      H/O cystoscopy  multiple          SOCIAL HISTORY:  - Currently working as a health care professional    FAMILY HISTORY:  No pertinent family history in first degree relatives    ANTIMICROBIALS:  Ciprofloxacin 7/27-   Unasyn 7/27-     Previous  Meropenem 7/26-27    OTHER MEDS:   MEDICATIONS  (STANDING):  acetaminophen     Tablet .. 650 every 6 hours PRN  aluminum hydroxide/magnesium hydroxide/simethicone Suspension 30 every 4 hours PRN  ketorolac   Injectable 15 every 4 hours PRN  ketorolac   Injectable 30 every 6 hours PRN  melatonin 3 at bedtime PRN  ondansetron Injectable 4 every 8 hours PRN  pantoprazole    Tablet 40 every 12 hours  valsartan 80 daily      VITALS:  Vital Signs Last 24 Hrs  T(F): 99.1 (07-27-23 @ 05:07), Max: 99.1 (07-27-23 @ 05:07)    Vital Signs Last 24 Hrs  HR: 109 (07-27-23 @ 05:07) (99 - 109)  BP: 124/77 (07-27-23 @ 05:07) (124/77 - 166/100)  RR: 18 (07-27-23 @ 05:07)  SpO2: 97% (07-27-23 @ 05:07) (97% - 100%)  Wt(kg): --    EXAM:  GENERAL: NAD, lying in bed  HEAD: No head lesions  NECK: Supple, nontender to palpation; no JVD  CHEST/LUNG: Clear to auscultation bilaterally  HEART: S1 S2  ABDOMEN: Soft, nontender, nondistended, catheter in the stoma draining clear urine.  GROIN- Whitish yellowish purulence noted on expression from the urethra Base of the penile shaft with small macular lesion (non-tender and not fluctuant) half centimeter in size.   EXTREMITIES: No clubbing, cyanosis, or petal edema  NERVOUS SYSTEM: Alert and oriented to person, time, place and situation  MSK: No joint erythema, swelling or pain  SKIN: No rashes or lesions, no superficial thrombophlebitis    Labs:                        12.9   11.86 )-----------( 298      ( 27 Jul 2023 07:29 )             39.8     07-27    138  |  102  |  11  ----------------------------<  100<H>  4.3   |  26  |  1.0    Ca    8.8      27 Jul 2023 07:29    TPro  5.9<L>  /  Alb  4.0  /  TBili  1.0  /  DBili  x   /  AST  12  /  ALT  10  /  AlkPhos  113  07-27      WBC Trend:  WBC Count: 11.86 (07-27-23 @ 07:29)  WBC Count: 9.32 (07-26-23 @ 19:50)      Auto Neutrophil #: 7.87 K/uL (07-26-23 @ 19:50)  Auto Neutrophil #: 4.03 K/uL (04-24-23 @ 18:01)  Auto Neutrophil #: 3.45 K/uL (11-03-22 @ 06:40)      Creatine Trend:  Creatinine: 1.0 (07-27)  Creatinine: 1.1 (07-26)      Liver Biochemical Testing Trend:  Alanine Aminotransferase (ALT/SGPT): 10 (07-27)  Alanine Aminotransferase (ALT/SGPT): 12 (07-26)  Alanine Aminotransferase (ALT/SGPT): 14 (04-24)  Alanine Aminotransferase (ALT/SGPT): 17 (11-03)  Aspartate Aminotransferase (AST/SGOT): 12 (07-27-23 @ 07:29)  Aspartate Aminotransferase (AST/SGOT): 16 (07-26-23 @ 19:50)  Aspartate Aminotransferase (AST/SGOT): 16 (04-24-23 @ 18:01)  Aspartate Aminotransferase (AST/SGOT): 18 (11-03-22 @ 06:40)  Bilirubin Total: 1.0 (07-27)  Bilirubin Total: 0.8 (07-26)  Bilirubin Total, Serum: 0.3 (04-24)  Bilirubin Total, Serum: 0.4 (11-03)      Trend LDH      Auto Eosinophil %: 0.1 % (07-26-23 @ 19:50)      Urinalysis Basic - ( 27 Jul 2023 07:29 )    Color: x / Appearance: x / SG: x / pH: x  Gluc: 100 mg/dL / Ketone: x  / Bili: x / Urobili: x   Blood: x / Protein: x / Nitrite: x   Leuk Esterase: x / RBC: x / WBC x   Sq Epi: x / Non Sq Epi: x / Bacteria: x        MICROBIOLOGY:    Male    Lactate, Blood: 1.2 (07-26 @ 19:50)        INFLAMMATORY MARKERS      RADIOLOGY & ADDITIONAL TESTS:  I have personally reviewed the imagings.  CXR      CT  CT Abdomen and Pelvis w/ IV Cont:   ACC: [id number] EXAM:  CT ABDOMEN AND PELVIS IC   ORDERED BY: RODRIGO ROSEN     PROCEDURE DATE:  07/26/2023          INTERPRETATION:  CLINICAL STATEMENT: Fever. Pelvic pain.    TECHNIQUE: Contiguous axial CT images were obtained from the lower chest   to the pubic symphysis following administration of 90 cc Omnipaque 350   intravenous contrast.  Oral contrast was not administered.  Reformatted   images in the coronal and sagittal planes were acquired. 10 cc contrast   discarded    Comparison made with CT abdomen and pelvis 4/19/2022    FINDINGS:    LOWER CHEST: Unremarkable.    HEPATOBILIARY: Unremarkable.    SPLEEN: Unremarkable.    PANCREAS: Unremarkable.    ADRENAL GLANDS: Unremarkable.    KIDNEYS/PELVIC ORGANS:  Symmetric pattern of renal enhancement. Unchanged bilateral mild   caliectasis/hydroureteronephrosis to the level of a decompressed   neobladder without evidence for obstructing calculus. Neobladder   decompressed secondary to a external drainage catheter entering the right   abdominal wall. Prostate gland unchanged. No evidence of prostate abscess.    ABDOMINOPELVIC NODES: Unremarkable.    PERITONEUM/MESENTERY/BOWEL: Unremarkable.    BONES/SOFT TISSUES: Osseous structures unremarkable. No evidence of   pelvic subcutaneous inflammatory stranding.    OTHER: Small bilateral fat-containing hernias.      IMPRESSION:    No evidence of acute abdominal pathology.    Unchanged bilateral mild caliectasis/hydroureteronephrosis to the level   of a decompressed neobladder. No evidence of prostate abscess.    --- End of Report ---            JILL POLANCO MD; Attending Radiologist  This document has been electronically signed. Jul 26 2023  8:45PM (07-26-23 @ 20:32)      CARDIOLOGY TESTING  12 Lead ECG:   Ventricular Rate 94 BPM    Atrial Rate 94 BPM    P-R Interval 152 ms    QRS Duration 78 ms    Q-T Interval 360 ms    QTC Calculation(Bazett) 450 ms    P Axis 50 degrees    R Axis 47 degrees    T Axis 50 degrees    Diagnosis Line Normal sinus rhythm  Normal ECG    Confirmed by SANJEEV ALVAREZ MD ([id number]) on 7/27/2023 6:39:04 AM (07-26-23 @ 20:44)

## 2023-07-27 NOTE — PATIENT PROFILE ADULT - FUNCTIONAL ASSESSMENT - BASIC MOBILITY 3.
Pt arrives to ED via personal transportation with c/o sore throat, pain upon swallowing and decreased appetite x couple days. Denies fever or any other symptoms at this time.  
4 = No assist / stand by assistance

## 2023-07-27 NOTE — H&P ADULT - NSHPLABSRESULTS_GEN_ALL_CORE
LABS:  cret                        13.3   9.32  )-----------( 289      ( 26 Jul 2023 19:50 )             40.1     07-26    136  |  101  |  14  ----------------------------<  87  4.4   |  24  |  1.1    Ca    9.3      26 Jul 2023 19:50    TPro  6.9  /  Alb  4.7  /  TBili  0.8  /  DBili  x   /  AST  16  /  ALT  12  /  AlkPhos  128<H>  07-26    PT/INR - ( 26 Jul 2023 19:50 )   PT: 11.20 sec;   INR: 0.98 ratio         PTT - ( 26 Jul 2023 19:50 )  PTT:34.7 sec      RADIOLOGY:    CT Abdomen and Pelvis w/ IV Cont (07.26.23 @ 20:32)     IMPRESSION:    No evidence of acute abdominal pathology.    Unchanged bilateral mild caliectasis/hydroureteronephrosis to the level   of a decompressed neobladder. No evidence of prostate abscess.

## 2023-07-27 NOTE — H&P ADULT - HISTORY OF PRESENT ILLNESS
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 Pt states he unu261.6F temp yesterday with 10/10 pain to his suprapubic region/base of penis area radiating to his rectum with reproducible white milky discharge from urethra. Pt was unable to straighten his legs due to the pain which has now improved since given ED meds. States he feels better pain now 5/10. States he is treated by urologist Dr Steiner who has performed multiple urological surgeries on pt for many years. 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 Pt states he pta235.6F temp yesterday with 10/10 pain to his suprapubic region/base of penis area radiating to his rectum with reproducible white milky discharge from urethra. Pt was unable to straighten his legs due to the pain which has now improved since given ED meds. States he feels better pain now 5/10. States he is treated by urologist Dr Steiner who has performed multiple urological surgeries on pt for many years. Denies CP, SOB, abd pain, HA.

## 2023-07-28 LAB
ANION GAP SERPL CALC-SCNC: 10 MMOL/L — SIGNIFICANT CHANGE UP (ref 7–14)
ANION GAP SERPL CALC-SCNC: 9 MMOL/L — SIGNIFICANT CHANGE UP (ref 7–14)
BUN SERPL-MCNC: 13 MG/DL — SIGNIFICANT CHANGE UP (ref 10–20)
BUN SERPL-MCNC: 13 MG/DL — SIGNIFICANT CHANGE UP (ref 10–20)
CALCIUM SERPL-MCNC: 8.5 MG/DL — SIGNIFICANT CHANGE UP (ref 8.4–10.5)
CALCIUM SERPL-MCNC: 8.8 MG/DL — SIGNIFICANT CHANGE UP (ref 8.4–10.5)
CHLORIDE SERPL-SCNC: 107 MMOL/L — SIGNIFICANT CHANGE UP (ref 98–110)
CHLORIDE SERPL-SCNC: 107 MMOL/L — SIGNIFICANT CHANGE UP (ref 98–110)
CO2 SERPL-SCNC: 24 MMOL/L — SIGNIFICANT CHANGE UP (ref 17–32)
CO2 SERPL-SCNC: 26 MMOL/L — SIGNIFICANT CHANGE UP (ref 17–32)
CREAT SERPL-MCNC: 1.1 MG/DL — SIGNIFICANT CHANGE UP (ref 0.7–1.5)
CREAT SERPL-MCNC: 1.2 MG/DL — SIGNIFICANT CHANGE UP (ref 0.7–1.5)
EGFR: 77 ML/MIN/1.73M2 — SIGNIFICANT CHANGE UP
EGFR: 86 ML/MIN/1.73M2 — SIGNIFICANT CHANGE UP
GLUCOSE SERPL-MCNC: 109 MG/DL — HIGH (ref 70–99)
GLUCOSE SERPL-MCNC: 99 MG/DL — SIGNIFICANT CHANGE UP (ref 70–99)
HCT VFR BLD CALC: 36.3 % — LOW (ref 42–52)
HCT VFR BLD CALC: 38.8 % — LOW (ref 42–52)
HGB BLD-MCNC: 11.7 G/DL — LOW (ref 14–18)
HGB BLD-MCNC: 12.7 G/DL — LOW (ref 14–18)
MAGNESIUM SERPL-MCNC: 1.9 MG/DL — SIGNIFICANT CHANGE UP (ref 1.8–2.4)
MCHC RBC-ENTMCNC: 25.2 PG — LOW (ref 27–31)
MCHC RBC-ENTMCNC: 25.6 PG — LOW (ref 27–31)
MCHC RBC-ENTMCNC: 32.2 G/DL — SIGNIFICANT CHANGE UP (ref 32–37)
MCHC RBC-ENTMCNC: 32.7 G/DL — SIGNIFICANT CHANGE UP (ref 32–37)
MCV RBC AUTO: 78.1 FL — LOW (ref 80–94)
MCV RBC AUTO: 78.2 FL — LOW (ref 80–94)
NRBC # BLD: 0 /100 WBCS — SIGNIFICANT CHANGE UP (ref 0–0)
NRBC # BLD: 0 /100 WBCS — SIGNIFICANT CHANGE UP (ref 0–0)
PLATELET # BLD AUTO: 280 K/UL — SIGNIFICANT CHANGE UP (ref 130–400)
PLATELET # BLD AUTO: 290 K/UL — SIGNIFICANT CHANGE UP (ref 130–400)
PMV BLD: 9.5 FL — SIGNIFICANT CHANGE UP (ref 7.4–10.4)
PMV BLD: 9.6 FL — SIGNIFICANT CHANGE UP (ref 7.4–10.4)
POTASSIUM SERPL-MCNC: 4.4 MMOL/L — SIGNIFICANT CHANGE UP (ref 3.5–5)
POTASSIUM SERPL-MCNC: 5.2 MMOL/L — HIGH (ref 3.5–5)
POTASSIUM SERPL-SCNC: 4.4 MMOL/L — SIGNIFICANT CHANGE UP (ref 3.5–5)
POTASSIUM SERPL-SCNC: 5.2 MMOL/L — HIGH (ref 3.5–5)
RBC # BLD: 4.65 M/UL — LOW (ref 4.7–6.1)
RBC # BLD: 4.96 M/UL — SIGNIFICANT CHANGE UP (ref 4.7–6.1)
RBC # FLD: 13.5 % — SIGNIFICANT CHANGE UP (ref 11.5–14.5)
RBC # FLD: 13.6 % — SIGNIFICANT CHANGE UP (ref 11.5–14.5)
SODIUM SERPL-SCNC: 141 MMOL/L — SIGNIFICANT CHANGE UP (ref 135–146)
SODIUM SERPL-SCNC: 142 MMOL/L — SIGNIFICANT CHANGE UP (ref 135–146)
WBC # BLD: 7.04 K/UL — SIGNIFICANT CHANGE UP (ref 4.8–10.8)
WBC # BLD: 7.13 K/UL — SIGNIFICANT CHANGE UP (ref 4.8–10.8)
WBC # FLD AUTO: 7.04 K/UL — SIGNIFICANT CHANGE UP (ref 4.8–10.8)
WBC # FLD AUTO: 7.13 K/UL — SIGNIFICANT CHANGE UP (ref 4.8–10.8)

## 2023-07-28 PROCEDURE — 99231 SBSQ HOSP IP/OBS SF/LOW 25: CPT

## 2023-07-28 RX ADMIN — Medication 200 MILLIGRAM(S): at 17:35

## 2023-07-28 RX ADMIN — Medication 30 MILLIGRAM(S): at 15:50

## 2023-07-28 RX ADMIN — PANTOPRAZOLE SODIUM 40 MILLIGRAM(S): 20 TABLET, DELAYED RELEASE ORAL at 05:08

## 2023-07-28 RX ADMIN — Medication 30 MILLIGRAM(S): at 09:44

## 2023-07-28 RX ADMIN — AMPICILLIN SODIUM AND SULBACTAM SODIUM 200 GRAM(S): 250; 125 INJECTION, POWDER, FOR SUSPENSION INTRAMUSCULAR; INTRAVENOUS at 17:33

## 2023-07-28 RX ADMIN — VALSARTAN 80 MILLIGRAM(S): 80 TABLET ORAL at 05:08

## 2023-07-28 RX ADMIN — Medication 200 MILLIGRAM(S): at 06:09

## 2023-07-28 RX ADMIN — Medication 30 MILLIGRAM(S): at 10:14

## 2023-07-28 RX ADMIN — AMPICILLIN SODIUM AND SULBACTAM SODIUM 200 GRAM(S): 250; 125 INJECTION, POWDER, FOR SUSPENSION INTRAMUSCULAR; INTRAVENOUS at 12:41

## 2023-07-28 RX ADMIN — Medication 1 MILLIGRAM(S): at 15:50

## 2023-07-28 RX ADMIN — Medication 1 MILLIGRAM(S): at 21:08

## 2023-07-28 RX ADMIN — SODIUM CHLORIDE 75 MILLILITER(S): 9 INJECTION INTRAMUSCULAR; INTRAVENOUS; SUBCUTANEOUS at 12:42

## 2023-07-28 RX ADMIN — Medication 30 MILLIGRAM(S): at 21:08

## 2023-07-28 RX ADMIN — AMPICILLIN SODIUM AND SULBACTAM SODIUM 200 GRAM(S): 250; 125 INJECTION, POWDER, FOR SUSPENSION INTRAMUSCULAR; INTRAVENOUS at 05:05

## 2023-07-28 RX ADMIN — PANTOPRAZOLE SODIUM 40 MILLIGRAM(S): 20 TABLET, DELAYED RELEASE ORAL at 17:33

## 2023-07-28 RX ADMIN — Medication 1 MILLIGRAM(S): at 09:47

## 2023-07-28 NOTE — PROGRESS NOTE ADULT - TIME BILLING
I have personally seen and examined this patient.    I have reviewed all pertinent clinical information and reviewed all relevant imaging and diagnostic studies personally.   I counseled the patient about diagnostic testing and treatment plan. All questions were answered.   I discussed recommendations with the primary team.
as above

## 2023-07-28 NOTE — PROGRESS NOTE ADULT - SUBJECTIVE AND OBJECTIVE BOX
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 Pt states he [id number] temp yesterday with 10/10 pain to his suprapubic region/base of penis area radiating to his rectum with reproducible white milky discharge from urethra. Pt was unable to straighten his legs due to the pain which has now improved since given ED meds. States he feels better pain now 5/10. States he is treated by urologist Dr Steiner who has performed multiple urological surgeries on pt for many years. Denies CP, SOB, abd pain, HA.     TODAY : pt feeling slightly better, with less bladder spasms and now afeb for 2 days   denies new symptoms   ID reviewed cx and revised abx    Allergies:  	No Known Drug Allergies:   	    PAST MEDICAL HISTORY:  Bladder disease or syndrome congenital bladder exstrophy    Eczema     GERD (gastroesophageal reflux disease)     H/O prostatitis     HTN (hypertension)     Recurrent urinary tract infection.     PAST SURGICAL HISTORY:  H/O arthroscopy of left knee    H/O cystoscopy multiple    History of bladder surgery multiple bladder reconstruction surgery [id number]    History of colon resection 1980's    S/P small bowel resection 1999, 2010.     FAMILY HISTORY:  No pertinent family history in first degree relatives. No pertinent family history of: .      Vital Signs Last 24 Hrs  T(C): 36 (28 Jul 2023 04:25), Max: 36 (27 Jul 2023 14:07)  T(F): 96.8 (28 Jul 2023 04:25), Max: 96.8 (27 Jul 2023 14:07)  HR: 93 (28 Jul 2023 04:25) (92 - 97)  BP: 120/70 (28 Jul 2023 04:25) (116/66 - 120/80)  BP(mean): --  RR: 18 (28 Jul 2023 04:25) (18 - 18)    GENERAL: NAD, lying in bed comfortably and pleasant  HEAD:  Atraumatic, Normocephalic  EYES: EOMI, conjunctiva and sclera clear  ENT: Moist mucous membranes  NECK: Supple, No JVD  CHEST/LUNG: CTA b/l,  Unlabored respirations  HEART: Regular rate and rhythm; No murmurs, rubs, or gallops  ABDOMEN: Bowel sounds present; Soft, Nontender, Nondistended, ols surgical scars   : + SPT, + nodule at base of penis, external drainage catheter entering the right abdominal wall  EXTREMITIES:  No clubbing, cyanosis, or edema  NERVOUS SYSTEM:  Alert & Oriented X3, speech clear. No deficits   MSK: FROM all 4 extremities, full and equal strength  SKIN: No rashes or lesions    labs pending         RADIOLOGY:    CT Abdomen and Pelvis w/ IV Cont (07.26.23 @ 20:32)     IMPRESSION:    No evidence of acute abdominal pathology.    Unchanged bilateral mild caliectasis/hydroureteronephrosis to the level   of a decompressed neobladder. No evidence of prostate abscess.     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 Pt states he [id number] temp yesterday with 10/10 pain to his suprapubic region/base of penis area radiating to his rectum with reproducible white milky discharge from urethra.      # Fever- resolved afeb x 2 days  # Prostatitis   # Urethral discharge  - WBC WNL, afebrile, lactate neg  - c/w IVAB  - ID consult reviewed now on unasyn and cipro  - pain control  - monitor VS  - tylenol for pyrexia   - f/u urethral discharge culture   - Ucx: neg  -  Bcx : neg  - am labs    # HTN  - c/w valsartan    # GERD  - protonix for omeprazole

## 2023-07-28 NOTE — PROGRESS NOTE ADULT - NS ATTEND AMEND GEN_ALL_CORE FT
pt seen and examined 7/28/23.   exam shows induration and significant tenderness near distal perineum in location of urethra.  CRISTELA shows significantly tender prostate, however not warm or boggy.   after exam EPS cultures obtained.  pt had drainage per urethra w palpation of urethra and prostate.  perhaps patient has recurrence of infected hair follicle in urethra causing infection proximally?  recommend mri pelvis to further eval prostate and urethra.  cont abx as per id. discussed case w ID attending

## 2023-07-28 NOTE — PROGRESS NOTE ADULT - SUBJECTIVE AND OBJECTIVE BOX
CLARKE RODRIGO  43y, Male    LOS  2d    INTERVAL EVENTS/HPI  - No acute events overnight  - T(F): , Max: 96.8 (07-27-23 @ 14:07)  - Denies any worsening symptoms  - Tolerating medication  - WBC Count: 11.86 (07-27-23 @ 07:29)  WBC Count: 9.32 (07-26-23 @ 19:50)  - Creatinine: 1.0 (07-27-23 @ 07:29)  Creatinine: 1.1 (07-26-23 @ 19:50)     REVIEW OF SYSTEMS:  CONSTITUTIONAL: No fever or chills  HEENT: No sore throat  RESPIRATORY: No cough, no shortness of breath  CARDIOVASCULAR: No chest pain or palpitations  GASTROINTESTINAL: No abdominal or epigastric pain  GENITOURINARY: No dysuria  NEUROLOGICAL: No headache/dizziness  MSK: No joint pain, erythema, or swelling; no back pain  SKIN: No itching, rashes  All other ROS negative except noted above    Prior hospital charts reviewed [Yes]  Primary team notes reviewed [Yes]  Other consultant notes reviewed [Yes]      ANTIMICROBIALS:   Ciprofloxacin 7/27-   Unasyn 7/27-     Previous  Meropenem 7/26-27    OTHER MEDS: MEDICATIONS  (STANDING):  acetaminophen     Tablet .. 650 every 6 hours PRN  aluminum hydroxide/magnesium hydroxide/simethicone Suspension 30 every 4 hours PRN  ketorolac   Injectable 30 every 6 hours PRN  ketorolac   Injectable 15 every 4 hours PRN  LORazepam   Injectable 1 every 6 hours PRN  melatonin 3 at bedtime PRN  ondansetron Injectable 4 every 8 hours PRN  pantoprazole    Tablet 40 every 12 hours  valsartan 80 daily      Vital Signs Last 24 Hrs  T(F): 96.8 (07-28-23 @ 04:25), Max: 99.1 (07-27-23 @ 05:07)    Vital Signs Last 24 Hrs  HR: 93 (07-28-23 @ 04:25) (92 - 97)  BP: 120/70 (07-28-23 @ 04:25) (116/66 - 120/80)  RR: 18 (07-28-23 @ 04:25)  SpO2: --  Wt(kg): --    EXAM:  GENERAL: NAD, lying in bed  HEAD: No head lesions  NECK: Supple, nontender to palpation; no JVD  CHEST/LUNG: Clear to auscultation bilaterally  HEART: S1 S2  ABDOMEN: Soft, nontender, nondistended, catheter in the stoma draining clear urine.  GROIN- Whitish yellowish purulence noted on expression from the urethra Base of the penile shaft with small macular lesion (non-tender and not fluctuant) half centimeter in size.   EXTREMITIES: No clubbing, cyanosis, or petal edema  NERVOUS SYSTEM: Alert and oriented to person, time, place and situation  MSK: No joint erythema, swelling or pain  SKIN: No rashes or lesions, no superficial thrombophlebitis      Labs:                        12.9   11.86 )-----------( 298      ( 27 Jul 2023 07:29 )             39.8     07-27    138  |  102  |  11  ----------------------------<  100<H>  4.3   |  26  |  1.0    Ca    8.8      27 Jul 2023 07:29    TPro  5.9<L>  /  Alb  4.0  /  TBili  1.0  /  DBili  x   /  AST  12  /  ALT  10  /  AlkPhos  113  07-27      WBC Trend:  WBC Count: 11.86 (07-27-23 @ 07:29)  WBC Count: 9.32 (07-26-23 @ 19:50)      Creatine Trend:  Creatinine: 1.0 (07-27)  Creatinine: 1.1 (07-26)      Liver Biochemical Testing Trend:  Alanine Aminotransferase (ALT/SGPT): 10 (07-27)  Alanine Aminotransferase (ALT/SGPT): 12 (07-26)  Alanine Aminotransferase (ALT/SGPT): 14 (04-24)  Alanine Aminotransferase (ALT/SGPT): 17 (11-03)  Aspartate Aminotransferase (AST/SGOT): 12 (07-27-23 @ 07:29)  Aspartate Aminotransferase (AST/SGOT): 16 (07-26-23 @ 19:50)  Aspartate Aminotransferase (AST/SGOT): 16 (04-24-23 @ 18:01)  Aspartate Aminotransferase (AST/SGOT): 18 (11-03-22 @ 06:40)  Bilirubin Total: 1.0 (07-27)  Bilirubin Total: 0.8 (07-26)  Bilirubin Total, Serum: 0.3 (04-24)  Bilirubin Total, Serum: 0.4 (11-03)      Trend LDH      Urinalysis Basic - ( 27 Jul 2023 07:29 )    Color: x / Appearance: x / SG: x / pH: x  Gluc: 100 mg/dL / Ketone: x  / Bili: x / Urobili: x   Blood: x / Protein: x / Nitrite: x   Leuk Esterase: x / RBC: x / WBC x   Sq Epi: x / Non Sq Epi: x / Bacteria: x        MICROBIOLOGY:    Male    Culture - Urine (collected 26 Jul 2023 19:50)  Source: Clean Catch Clean Catch (Midstream)  Final Report:    <10,000 CFU/mL Normal Urogenital Mela    Culture - Blood (collected 26 Jul 2023 19:50)  Source: .Blood Blood-Peripheral  Preliminary Report:    No growth at 24 hours    Culture - Blood (collected 26 Jul 2023 19:50)  Source: .Blood Blood-Peripheral  Preliminary Report:    No growth at 24 hours    Culture - Urine (collected 09 Apr 2022 21:55)  Source: Clean Catch Clean Catch (Midstream)  Final Report:    No growth    Culture - Blood (collected 09 Apr 2022 21:44)  Source: .Blood Blood-Peripheral  Final Report:    No Growth Final    Culture - Blood (collected 09 Apr 2022 21:44)  Source: .Blood Blood-Peripheral  Final Report:    No Growth Final    Lactate, Blood: 1.2 (07-26 @ 19:50)    RADIOLOGY & ADDITIONAL TESTS:  I have personally reviewed the relevant images.   CXR      CT  CT Abdomen and Pelvis w/ IV Cont:   ACC: [id number] EXAM:  CT ABDOMEN AND PELVIS IC   ORDERED BY: RODRIGO ROSEN     PROCEDURE DATE:  07/26/2023          INTERPRETATION:  CLINICAL STATEMENT: Fever. Pelvic pain.    TECHNIQUE: Contiguous axial CT images were obtained from the lower chest   to the pubic symphysis following administration of 90 cc Omnipaque 350   intravenous contrast.  Oral contrast was not administered.  Reformatted   images in the coronal and sagittal planes were acquired. 10 cc contrast   discarded    Comparison made with CT abdomen and pelvis 4/19/2022    FINDINGS:    LOWER CHEST: Unremarkable.    HEPATOBILIARY: Unremarkable.    SPLEEN: Unremarkable.    PANCREAS: Unremarkable.    ADRENAL GLANDS: Unremarkable.    KIDNEYS/PELVIC ORGANS:  Symmetric pattern of renal enhancement. Unchanged bilateral mild   caliectasis/hydroureteronephrosis to the level of a decompressed   neobladder without evidence for obstructing calculus. Neobladder   decompressed secondary to a external drainage catheter entering the right   abdominal wall. Prostate gland unchanged. No evidence of prostate abscess.    ABDOMINOPELVIC NODES: Unremarkable.    PERITONEUM/MESENTERY/BOWEL: Unremarkable.    BONES/SOFT TISSUES: Osseous structures unremarkable. No evidence of   pelvic subcutaneous inflammatory stranding.    OTHER: Small bilateral fat-containing hernias.      IMPRESSION:    No evidence of acute abdominal pathology.    Unchanged bilateral mild caliectasis/hydroureteronephrosis to the level   of a decompressed neobladder. No evidence of prostate abscess.    --- End of Report ---            JILL POLANCO MD; Attending Radiologist  This document has been electronically signed. Jul 26 2023  8:45PM (07-26-23 @ 20:32)        WEIGHT  Weight (kg): 63.5 (07-26-23 @ 19:19)      All available historical records have been reviewed       STEFANIARODRIGO CHAVEZ  43y, Male    LOS  2d    INTERVAL EVENTS/HPI  - No acute events overnight  - T(F): , Max: 96.8 (07-27-23 @ 14:07)  - Denies any worsening symptoms  - Tolerating medication  - WBC Count: 11.86 (07-27-23 @ 07:29)  WBC Count: 9.32 (07-26-23 @ 19:50)  - Creatinine: 1.0 (07-27-23 @ 07:29)  Creatinine: 1.1 (07-26-23 @ 19:50)  -Drainage and drip persists. Slightly clearing out.     REVIEW OF SYSTEMS:  CONSTITUTIONAL: No fever or chills  HEENT: No sore throat  RESPIRATORY: No cough, no shortness of breath  CARDIOVASCULAR: No chest pain or palpitations  GASTROINTESTINAL: No abdominal or epigastric pain  GENITOURINARY: No dysuria  NEUROLOGICAL: No headache/dizziness  MSK: No joint pain, erythema, or swelling; no back pain  SKIN: No itching, rashes  All other ROS negative except noted above    Prior hospital charts reviewed [Yes]  Primary team notes reviewed [Yes]  Other consultant notes reviewed [Yes]      ANTIMICROBIALS:   Ciprofloxacin 7/27-   Unasyn 7/27-     Previous  Meropenem 7/26-27    OTHER MEDS: MEDICATIONS  (STANDING):  acetaminophen     Tablet .. 650 every 6 hours PRN  aluminum hydroxide/magnesium hydroxide/simethicone Suspension 30 every 4 hours PRN  ketorolac   Injectable 30 every 6 hours PRN  ketorolac   Injectable 15 every 4 hours PRN  LORazepam   Injectable 1 every 6 hours PRN  melatonin 3 at bedtime PRN  ondansetron Injectable 4 every 8 hours PRN  pantoprazole    Tablet 40 every 12 hours  valsartan 80 daily      Vital Signs Last 24 Hrs  T(F): 96.8 (07-28-23 @ 04:25), Max: 99.1 (07-27-23 @ 05:07)    Vital Signs Last 24 Hrs  HR: 93 (07-28-23 @ 04:25) (92 - 97)  BP: 120/70 (07-28-23 @ 04:25) (116/66 - 120/80)  RR: 18 (07-28-23 @ 04:25)  SpO2: --  Wt(kg): --    EXAM:  GENERAL: NAD, lying in bed  HEAD: No head lesions  NECK: Supple, nontender to palpation; no JVD  CHEST/LUNG: Clear to auscultation bilaterally  HEART: S1 S2  ABDOMEN: Soft, nontender, nondistended, catheter in the stoma draining clear urine.  GROIN- Whitish yellowish purulence noted on expression from the urethra Base of the penile shaft with small macular lesion (non-tender and not fluctuant) half centimeter in size.   EXTREMITIES: No clubbing, cyanosis, or petal edema  NERVOUS SYSTEM: Alert and oriented to person, time, place and situation  MSK: No joint erythema, swelling or pain  SKIN: No rashes or lesions, no superficial thrombophlebitis      Labs:                        12.9   11.86 )-----------( 298      ( 27 Jul 2023 07:29 )             39.8     07-27    138  |  102  |  11  ----------------------------<  100<H>  4.3   |  26  |  1.0    Ca    8.8      27 Jul 2023 07:29    TPro  5.9<L>  /  Alb  4.0  /  TBili  1.0  /  DBili  x   /  AST  12  /  ALT  10  /  AlkPhos  113  07-27      WBC Trend:  WBC Count: 11.86 (07-27-23 @ 07:29)  WBC Count: 9.32 (07-26-23 @ 19:50)      Creatine Trend:  Creatinine: 1.0 (07-27)  Creatinine: 1.1 (07-26)      Liver Biochemical Testing Trend:  Alanine Aminotransferase (ALT/SGPT): 10 (07-27)  Alanine Aminotransferase (ALT/SGPT): 12 (07-26)  Alanine Aminotransferase (ALT/SGPT): 14 (04-24)  Alanine Aminotransferase (ALT/SGPT): 17 (11-03)  Aspartate Aminotransferase (AST/SGOT): 12 (07-27-23 @ 07:29)  Aspartate Aminotransferase (AST/SGOT): 16 (07-26-23 @ 19:50)  Aspartate Aminotransferase (AST/SGOT): 16 (04-24-23 @ 18:01)  Aspartate Aminotransferase (AST/SGOT): 18 (11-03-22 @ 06:40)  Bilirubin Total: 1.0 (07-27)  Bilirubin Total: 0.8 (07-26)  Bilirubin Total, Serum: 0.3 (04-24)  Bilirubin Total, Serum: 0.4 (11-03)      Trend LDH      Urinalysis Basic - ( 27 Jul 2023 07:29 )    Color: x / Appearance: x / SG: x / pH: x  Gluc: 100 mg/dL / Ketone: x  / Bili: x / Urobili: x   Blood: x / Protein: x / Nitrite: x   Leuk Esterase: x / RBC: x / WBC x   Sq Epi: x / Non Sq Epi: x / Bacteria: x        MICROBIOLOGY:    Male    Culture - Urine (collected 26 Jul 2023 19:50)  Source: Clean Catch Clean Catch (Midstream)  Final Report:    <10,000 CFU/mL Normal Urogenital Mela    Culture - Blood (collected 26 Jul 2023 19:50)  Source: .Blood Blood-Peripheral  Preliminary Report:    No growth at 24 hours    Culture - Blood (collected 26 Jul 2023 19:50)  Source: .Blood Blood-Peripheral  Preliminary Report:    No growth at 24 hours    Culture - Urine (collected 09 Apr 2022 21:55)  Source: Clean Catch Clean Catch (Midstream)  Final Report:    No growth    Culture - Blood (collected 09 Apr 2022 21:44)  Source: .Blood Blood-Peripheral  Final Report:    No Growth Final    Culture - Blood (collected 09 Apr 2022 21:44)  Source: .Blood Blood-Peripheral  Final Report:    No Growth Final    Lactate, Blood: 1.2 (07-26 @ 19:50)    RADIOLOGY & ADDITIONAL TESTS:  I have personally reviewed the relevant images.   CXR      CT  CT Abdomen and Pelvis w/ IV Cont:   ACC: [id number] EXAM:  CT ABDOMEN AND PELVIS IC   ORDERED BY: RODRIGO ROSEN     PROCEDURE DATE:  07/26/2023          INTERPRETATION:  CLINICAL STATEMENT: Fever. Pelvic pain.    TECHNIQUE: Contiguous axial CT images were obtained from the lower chest   to the pubic symphysis following administration of 90 cc Omnipaque 350   intravenous contrast.  Oral contrast was not administered.  Reformatted   images in the coronal and sagittal planes were acquired. 10 cc contrast   discarded    Comparison made with CT abdomen and pelvis 4/19/2022    FINDINGS:    LOWER CHEST: Unremarkable.    HEPATOBILIARY: Unremarkable.    SPLEEN: Unremarkable.    PANCREAS: Unremarkable.    ADRENAL GLANDS: Unremarkable.    KIDNEYS/PELVIC ORGANS:  Symmetric pattern of renal enhancement. Unchanged bilateral mild   caliectasis/hydroureteronephrosis to the level of a decompressed   neobladder without evidence for obstructing calculus. Neobladder   decompressed secondary to a external drainage catheter entering the right   abdominal wall. Prostate gland unchanged. No evidence of prostate abscess.    ABDOMINOPELVIC NODES: Unremarkable.    PERITONEUM/MESENTERY/BOWEL: Unremarkable.    BONES/SOFT TISSUES: Osseous structures unremarkable. No evidence of   pelvic subcutaneous inflammatory stranding.    OTHER: Small bilateral fat-containing hernias.      IMPRESSION:    No evidence of acute abdominal pathology.    Unchanged bilateral mild caliectasis/hydroureteronephrosis to the level   of a decompressed neobladder. No evidence of prostate abscess.    --- End of Report ---            JILL POLANCO MD; Attending Radiologist  This document has been electronically signed. Jul 26 2023  8:45PM (07-26-23 @ 20:32)        WEIGHT  Weight (kg): 63.5 (07-26-23 @ 19:19)      All available historical records have been reviewed       STEFANIARODRIGO CHAVEZ  43y, Male    LOS  2d    INTERVAL EVENTS/HPI  - No acute events overnight  - T(F): , Max: 96.8 (07-27-23 @ 14:07)  - Denies any worsening symptoms  - Tolerating medication  - WBC Count: 11.86 (07-27-23 @ 07:29)  WBC Count: 9.32 (07-26-23 @ 19:50)  - Creatinine: 1.0 (07-27-23 @ 07:29)  Creatinine: 1.1 (07-26-23 @ 19:50)  -Drainage and drip persists. Slightly clearing out.     REVIEW OF SYSTEMS:  CONSTITUTIONAL: No fever or chills  HEENT: No sore throat  RESPIRATORY: No cough, no shortness of breath  CARDIOVASCULAR: No chest pain or palpitations  GASTROINTESTINAL: No abdominal or epigastric pain  GENITOURINARY: No dysuria  NEUROLOGICAL: No headache/dizziness  MSK: No joint pain, erythema, or swelling; no back pain  SKIN: No itching, rashes  All other ROS negative except noted above    Prior hospital charts reviewed [Yes]  Primary team notes reviewed [Yes]  Other consultant notes reviewed [Yes]      ANTIMICROBIALS:   Ciprofloxacin 7/27-   Unasyn 7/27-     Previous  Meropenem 7/26-27    OTHER MEDS: MEDICATIONS  (STANDING):  acetaminophen     Tablet .. 650 every 6 hours PRN  aluminum hydroxide/magnesium hydroxide/simethicone Suspension 30 every 4 hours PRN  ketorolac   Injectable 30 every 6 hours PRN  ketorolac   Injectable 15 every 4 hours PRN  LORazepam   Injectable 1 every 6 hours PRN  melatonin 3 at bedtime PRN  ondansetron Injectable 4 every 8 hours PRN  pantoprazole    Tablet 40 every 12 hours  valsartan 80 daily      Vital Signs Last 24 Hrs  T(F): 96.8 (07-28-23 @ 04:25), Max: 99.1 (07-27-23 @ 05:07)    Vital Signs Last 24 Hrs  HR: 93 (07-28-23 @ 04:25) (92 - 97)  BP: 120/70 (07-28-23 @ 04:25) (116/66 - 120/80)  RR: 18 (07-28-23 @ 04:25)  SpO2: --  Wt(kg): --    EXAM:  GENERAL: NAD, lying in bed  HEAD: No head lesions  NECK: Supple, nontender to palpation; no JVD  CHEST/LUNG: Clear to auscultation bilaterally  HEART: S1 S2  ABDOMEN: Soft, nontender, nondistended, catheter in the stoma draining clear urine.  GROIN- Whitish yellowish purulence noted on expression from the urethra-improving. Base of the penile shaft with small macular lesion (non-tender and not fluctuant) half centimeter in size- improving  EXTREMITIES: No clubbing, cyanosis, or petal edema  NERVOUS SYSTEM: Alert and oriented to person, time, place and situation  MSK: No joint erythema, swelling or pain  SKIN: No rashes or lesions, no superficial thrombophlebitis      Labs:                        12.9   11.86 )-----------( 298      ( 27 Jul 2023 07:29 )             39.8     07-27    138  |  102  |  11  ----------------------------<  100<H>  4.3   |  26  |  1.0    Ca    8.8      27 Jul 2023 07:29    TPro  5.9<L>  /  Alb  4.0  /  TBili  1.0  /  DBili  x   /  AST  12  /  ALT  10  /  AlkPhos  113  07-27      WBC Trend:  WBC Count: 11.86 (07-27-23 @ 07:29)  WBC Count: 9.32 (07-26-23 @ 19:50)      Creatine Trend:  Creatinine: 1.0 (07-27)  Creatinine: 1.1 (07-26)      Liver Biochemical Testing Trend:  Alanine Aminotransferase (ALT/SGPT): 10 (07-27)  Alanine Aminotransferase (ALT/SGPT): 12 (07-26)  Alanine Aminotransferase (ALT/SGPT): 14 (04-24)  Alanine Aminotransferase (ALT/SGPT): 17 (11-03)  Aspartate Aminotransferase (AST/SGOT): 12 (07-27-23 @ 07:29)  Aspartate Aminotransferase (AST/SGOT): 16 (07-26-23 @ 19:50)  Aspartate Aminotransferase (AST/SGOT): 16 (04-24-23 @ 18:01)  Aspartate Aminotransferase (AST/SGOT): 18 (11-03-22 @ 06:40)  Bilirubin Total: 1.0 (07-27)  Bilirubin Total: 0.8 (07-26)  Bilirubin Total, Serum: 0.3 (04-24)  Bilirubin Total, Serum: 0.4 (11-03)      Trend LDH      Urinalysis Basic - ( 27 Jul 2023 07:29 )    Color: x / Appearance: x / SG: x / pH: x  Gluc: 100 mg/dL / Ketone: x  / Bili: x / Urobili: x   Blood: x / Protein: x / Nitrite: x   Leuk Esterase: x / RBC: x / WBC x   Sq Epi: x / Non Sq Epi: x / Bacteria: x        MICROBIOLOGY:    Male    Culture - Urine (collected 26 Jul 2023 19:50)  Source: Clean Catch Clean Catch (Midstream)  Final Report:    <10,000 CFU/mL Normal Urogenital Mela    Culture - Blood (collected 26 Jul 2023 19:50)  Source: .Blood Blood-Peripheral  Preliminary Report:    No growth at 24 hours    Culture - Blood (collected 26 Jul 2023 19:50)  Source: .Blood Blood-Peripheral  Preliminary Report:    No growth at 24 hours    Culture - Urine (collected 09 Apr 2022 21:55)  Source: Clean Catch Clean Catch (Midstream)  Final Report:    No growth    Culture - Blood (collected 09 Apr 2022 21:44)  Source: .Blood Blood-Peripheral  Final Report:    No Growth Final    Culture - Blood (collected 09 Apr 2022 21:44)  Source: .Blood Blood-Peripheral  Final Report:    No Growth Final    Lactate, Blood: 1.2 (07-26 @ 19:50)    RADIOLOGY & ADDITIONAL TESTS:  I have personally reviewed the relevant images.   CXR      CT  CT Abdomen and Pelvis w/ IV Cont:   ACC: [id number] EXAM:  CT ABDOMEN AND PELVIS IC   ORDERED BY: RODRIGO ROSEN     PROCEDURE DATE:  07/26/2023          INTERPRETATION:  CLINICAL STATEMENT: Fever. Pelvic pain.    TECHNIQUE: Contiguous axial CT images were obtained from the lower chest   to the pubic symphysis following administration of 90 cc Omnipaque 350   intravenous contrast.  Oral contrast was not administered.  Reformatted   images in the coronal and sagittal planes were acquired. 10 cc contrast   discarded    Comparison made with CT abdomen and pelvis 4/19/2022    FINDINGS:    LOWER CHEST: Unremarkable.    HEPATOBILIARY: Unremarkable.    SPLEEN: Unremarkable.    PANCREAS: Unremarkable.    ADRENAL GLANDS: Unremarkable.    KIDNEYS/PELVIC ORGANS:  Symmetric pattern of renal enhancement. Unchanged bilateral mild   caliectasis/hydroureteronephrosis to the level of a decompressed   neobladder without evidence for obstructing calculus. Neobladder   decompressed secondary to a external drainage catheter entering the right   abdominal wall. Prostate gland unchanged. No evidence of prostate abscess.    ABDOMINOPELVIC NODES: Unremarkable.    PERITONEUM/MESENTERY/BOWEL: Unremarkable.    BONES/SOFT TISSUES: Osseous structures unremarkable. No evidence of   pelvic subcutaneous inflammatory stranding.    OTHER: Small bilateral fat-containing hernias.      IMPRESSION:    No evidence of acute abdominal pathology.    Unchanged bilateral mild caliectasis/hydroureteronephrosis to the level   of a decompressed neobladder. No evidence of prostate abscess.    --- End of Report ---            JILL POLANCO MD; Attending Radiologist  This document has been electronically signed. Jul 26 2023  8:45PM (07-26-23 @ 20:32)        WEIGHT  Weight (kg): 63.5 (07-26-23 @ 19:19)      All available historical records have been reviewed

## 2023-07-28 NOTE — PROGRESS NOTE ADULT - ASSESSMENT
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   CT abdomen does not show any fluid collections or abscess.    IMPRESSION  # Unclear cause of pain- Probable bladder muscle spasm?  # UA normal. Ingrown hair on the shaft of penis, vs urethral diverticuli?  # CT abdomen- no prostate abscess, unchanged bilateral mild caliectasis/hydroureteronephrosis.   # Fever episode  #History of recurrent UTI   # Urethral drainage    RECOMMENDATIONS  -Follow up with the blood cultures, urine and urethral cultures  -Continue IV ciprofloxacin 400mg BID (aware of allergies) and Unasyn 3 gram q 6 hours.   -Will consider obtaining MRI of the abdomen/ pelvis if symptoms do not resolve to further elucidate the possible cause given complicated surgical history.  - Duration of treatment to be determined.   - Continue to hold methanamine while being managed for possible infection.  - If Cultures positive for E.feacalis, will consider adding susceptibilities for fosfomycin- to be used for PPX.    If any questions, please send a message or call on [employer]  Please continue to update ID with any pertinent new laboratory or radiographic findings.    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   CT abdomen does not show any fluid collections or abscess.    IMPRESSION  # Unclear cause of pain- Probable bladder muscle spasm?  # UA normal. Ingrown hair on the shaft of penis, vs urethral diverticuli?  # CT abdomen- no prostate abscess, unchanged bilateral mild caliectasis/hydroureteronephrosis.   # Fever episode  #History of recurrent UTI   # Urethral drainage    RECOMMENDATIONS  -Follow up with the blood cultures, urine and urethral cultures  -Continue IV ciprofloxacin 400mg BID (aware of allergies) and Unasyn 3 gram q 6 hours.   -Consider MRI of the abdomen/ pelvis if symptoms do not resolve to further elucidate the possible cause given complicated surgical history.  - Duration of treatment to be determined.   - Continue to hold methanamine while being managed for possible infection.  - If Cultures positive for E.feacalis, will consider adding susceptibilities for fosfomycin- to be used for PPX.    If any questions, please send a message or call on [employer] Teams  Please continue to update ID with any pertinent new laboratory or radiographic findings.    Darcie Brooks M.D  Infectious Diseases Attending  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   CT abdomen does not show any fluid collections or abscess.    IMPRESSION  # Unclear cause of pain- Probable bladder muscle spasm?  # UA normal. Ingrown hair on the shaft of penis, vs urethral diverticuli?  # CT abdomen- no prostate abscess, unchanged bilateral mild caliectasis/hydroureteronephrosis.   # Fever episode  #History of recurrent UTI   # Urethral drainage    RECOMMENDATIONS  -Follow up with the blood cultures, urine and urethral cultures  -Continue IV ciprofloxacin 400mg BID (aware of allergies) and Unasyn 3 gram q 6 hours.   -Consider MRI of the abdomen/ pelvis if symptoms do not resolve to further elucidate the possible cause given complicated surgical history.  -Discussed in detail with the urology team and Dr. Samuels- exam was performed which was reported to have prostate exam which was concerning for inflammation.   -Will discuss about any interventions if MRI shows any concerning findings.   - Duration of treatment to be determined.   - Continue to hold methanamine while being managed for possible infection.  - Will discuss about antimicrobial PPX once acute infection is resolved.     If any questions, please send a message or call on [employer] Teams  Please continue to update ID with any pertinent new laboratory or radiographic findings.    Darcie Brooks M.D  Infectious Diseases Attending  [facility]

## 2023-07-29 ENCOUNTER — TRANSCRIPTION ENCOUNTER (OUTPATIENT)
Age: 43
End: 2023-07-29

## 2023-07-29 VITALS — RESPIRATION RATE: 16 BRPM | DIASTOLIC BLOOD PRESSURE: 75 MMHG | HEART RATE: 60 BPM | SYSTOLIC BLOOD PRESSURE: 116 MMHG

## 2023-07-29 LAB
-  AMIKACIN: SIGNIFICANT CHANGE UP
-  AMOXICILLIN/CLAVULANIC ACID: SIGNIFICANT CHANGE UP
-  AMPICILLIN/SULBACTAM: SIGNIFICANT CHANGE UP
-  AMPICILLIN: SIGNIFICANT CHANGE UP
-  AZTREONAM: SIGNIFICANT CHANGE UP
-  CEFAZOLIN: SIGNIFICANT CHANGE UP
-  CEFEPIME: SIGNIFICANT CHANGE UP
-  CEFOXITIN: SIGNIFICANT CHANGE UP
-  CEFTRIAXONE: SIGNIFICANT CHANGE UP
-  CIPROFLOXACIN: SIGNIFICANT CHANGE UP
-  ERTAPENEM: SIGNIFICANT CHANGE UP
-  GENTAMICIN: SIGNIFICANT CHANGE UP
-  IMIPENEM: SIGNIFICANT CHANGE UP
-  LEVOFLOXACIN: SIGNIFICANT CHANGE UP
-  MEROPENEM: SIGNIFICANT CHANGE UP
-  PIPERACILLIN/TAZOBACTAM: SIGNIFICANT CHANGE UP
-  TOBRAMYCIN: SIGNIFICANT CHANGE UP
-  TRIMETHOPRIM/SULFAMETHOXAZOLE: SIGNIFICANT CHANGE UP
ANION GAP SERPL CALC-SCNC: 11 MMOL/L — SIGNIFICANT CHANGE UP (ref 7–14)
BUN SERPL-MCNC: 12 MG/DL — SIGNIFICANT CHANGE UP (ref 10–20)
CALCIUM SERPL-MCNC: 8.3 MG/DL — LOW (ref 8.4–10.5)
CHLORIDE SERPL-SCNC: 108 MMOL/L — SIGNIFICANT CHANGE UP (ref 98–110)
CO2 SERPL-SCNC: 22 MMOL/L — SIGNIFICANT CHANGE UP (ref 17–32)
CREAT SERPL-MCNC: 1.1 MG/DL — SIGNIFICANT CHANGE UP (ref 0.7–1.5)
EGFR: 86 ML/MIN/1.73M2 — SIGNIFICANT CHANGE UP
GLUCOSE SERPL-MCNC: 83 MG/DL — SIGNIFICANT CHANGE UP (ref 70–99)
GRAM STN FLD: SIGNIFICANT CHANGE UP
HCT VFR BLD CALC: 36.5 % — LOW (ref 42–52)
HGB BLD-MCNC: 11.8 G/DL — LOW (ref 14–18)
MAGNESIUM SERPL-MCNC: 1.9 MG/DL — SIGNIFICANT CHANGE UP (ref 1.8–2.4)
MCHC RBC-ENTMCNC: 25.3 PG — LOW (ref 27–31)
MCHC RBC-ENTMCNC: 32.3 G/DL — SIGNIFICANT CHANGE UP (ref 32–37)
MCV RBC AUTO: 78.3 FL — LOW (ref 80–94)
METHOD TYPE: SIGNIFICANT CHANGE UP
NRBC # BLD: 0 /100 WBCS — SIGNIFICANT CHANGE UP (ref 0–0)
PLATELET # BLD AUTO: 282 K/UL — SIGNIFICANT CHANGE UP (ref 130–400)
PMV BLD: 10.2 FL — SIGNIFICANT CHANGE UP (ref 7.4–10.4)
POTASSIUM SERPL-MCNC: 4.6 MMOL/L — SIGNIFICANT CHANGE UP (ref 3.5–5)
POTASSIUM SERPL-SCNC: 4.6 MMOL/L — SIGNIFICANT CHANGE UP (ref 3.5–5)
RBC # BLD: 4.66 M/UL — LOW (ref 4.7–6.1)
RBC # FLD: 13.6 % — SIGNIFICANT CHANGE UP (ref 11.5–14.5)
SODIUM SERPL-SCNC: 141 MMOL/L — SIGNIFICANT CHANGE UP (ref 135–146)
SPECIMEN SOURCE: SIGNIFICANT CHANGE UP
WBC # BLD: 5.18 K/UL — SIGNIFICANT CHANGE UP (ref 4.8–10.8)
WBC # FLD AUTO: 5.18 K/UL — SIGNIFICANT CHANGE UP (ref 4.8–10.8)

## 2023-07-29 RX ORDER — VALSARTAN 80 MG/1
1 TABLET ORAL
Qty: 0 | Refills: 0 | DISCHARGE
Start: 2023-07-29

## 2023-07-29 RX ORDER — CIPROFLOXACIN LACTATE 400MG/40ML
1 VIAL (ML) INTRAVENOUS
Qty: 0 | Refills: 0 | DISCHARGE
Start: 2023-07-29 | End: 2023-08-19

## 2023-07-29 RX ORDER — VALSARTAN 80 MG/1
1 TABLET ORAL
Qty: 0 | Refills: 0 | DISCHARGE

## 2023-07-29 RX ORDER — METHENAMINE MANDELATE 1 G
1 TABLET ORAL
Qty: 0 | Refills: 0 | DISCHARGE

## 2023-07-29 RX ORDER — FOSFOMYCIN TROMETHAMINE 3 G/1
1 POWDER ORAL
Qty: 14 | Refills: 0
Start: 2023-07-29 | End: 2023-08-11

## 2023-07-29 RX ADMIN — AMPICILLIN SODIUM AND SULBACTAM SODIUM 200 GRAM(S): 250; 125 INJECTION, POWDER, FOR SUSPENSION INTRAMUSCULAR; INTRAVENOUS at 00:53

## 2023-07-29 RX ADMIN — PANTOPRAZOLE SODIUM 40 MILLIGRAM(S): 20 TABLET, DELAYED RELEASE ORAL at 05:08

## 2023-07-29 RX ADMIN — AMPICILLIN SODIUM AND SULBACTAM SODIUM 200 GRAM(S): 250; 125 INJECTION, POWDER, FOR SUSPENSION INTRAMUSCULAR; INTRAVENOUS at 05:09

## 2023-07-29 RX ADMIN — VALSARTAN 80 MILLIGRAM(S): 80 TABLET ORAL at 05:08

## 2023-07-29 RX ADMIN — Medication 200 MILLIGRAM(S): at 05:10

## 2023-07-29 NOTE — DISCHARGE NOTE PROVIDER - NSDCCPCAREPLAN_GEN_ALL_CORE_FT
PRINCIPAL DISCHARGE DIAGNOSIS  Diagnosis: Fever  Assessment and Plan of Treatment: ? prostatitis :   take cipro/ fosfomycin  as directed   monitor pain in achillies  follow up with urologist and ID   monitor fever      SECONDARY DISCHARGE DIAGNOSES  Diagnosis: Urethral discharge  Assessment and Plan of Treatment:

## 2023-07-29 NOTE — PROGRESS NOTE ADULT - SUBJECTIVE AND OBJECTIVE BOX
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 Pt states he kkq253.6F temp yesterday with 10/10 pain to his suprapubic region/base of penis area radiating to his rectum with reproducible white milky discharge from urethra. Pt was unable to straighten his legs due to the pain which has now improved since given ED meds. States he feels better pain now 5/10. States he is treated by urologist Dr Steiner who has performed multiple urological surgeries on pt for many years. Denies CP, SOB, abd pain, HA.     TODAY : pt feeling slightly better, with less bladder spasms and now afeb for 3  days   denies new symptoms   on unasyn and cipro    Allergies:  	No Known Drug Allergies:   	    PAST MEDICAL HISTORY:  Bladder disease or syndrome congenital bladder exstrophy    Eczema     GERD (gastroesophageal reflux disease)     H/O prostatitis     HTN (hypertension)     Recurrent urinary tract infection.     PAST SURGICAL HISTORY:  H/O arthroscopy of left knee    H/O cystoscopy multiple    History of bladder surgery multiple bladder reconstruction surgery [id number]    History of colon resection 1980's    S/P small bowel resection 1999, 2010.     FAMILY HISTORY:  No pertinent family history in first degree relatives. No pertinent family history of: .      Vital Signs Last 24 Hrs  T(C): 35.8 (28 Jul 2023 21:33), Max: 36 (28 Jul 2023 14:51)  T(F): 96.5 (28 Jul 2023 21:33), Max: 96.8 (28 Jul 2023 14:51)  HR: 60 (29 Jul 2023 05:38) (60 - 84)  BP: 116/75 (29 Jul 2023 05:38) (116/75 - 132/78)  BP(mean): --  RR: 16 (29 Jul 2023 05:38) (16 - 16)  SpO2: 96% (28 Jul 2023 21:33) (96% - 96%)        GENERAL: NAD, lying in bed comfortably and pleasant  HEAD:  Atraumatic, Normocephalic  EYES: EOMI, conjunctiva and sclera clear  ENT: Moist mucous membranes  NECK: Supple, No JVD  CHEST/LUNG: CTA b/l,  Unlabored respirations  HEART: Regular rate and rhythm; No murmurs, rubs, or gallops  ABDOMEN: Bowel sounds present; Soft, Nontender, Nondistended, ols surgical scars   : + SPT, + nodule at base of penis, external drainage catheter entering the right abdominal wall  EXTREMITIES:  No clubbing, cyanosis, or edema  NERVOUS SYSTEM:  Alert & Oriented X3, speech clear. No deficits   MSK: FROM all 4 extremities, full and equal strength  SKIN: No rashes or lesions    labs pending         RADIOLOGY:    CT Abdomen and Pelvis w/ IV Cont (07.26.23 @ 20:32)     IMPRESSION:    No evidence of acute abdominal pathology.    Unchanged bilateral mild caliectasis/hydroureteronephrosis to the level   of a decompressed neobladder. No evidence of prostate abscess.     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 Pt states he biz209.6F temp yesterday with 10/10 pain to his suprapubic region/base of penis area radiating to his rectum with reproducible white milky discharge from urethra.      # Fever- resolved afeb x 2 days  # Prostatitis   # Urethral discharge  - WBC WNL, afebrile, lactate neg  - c/w IVAB  - ID consult reviewed now on unasyn and cipro  - pain control  - monitor VS  - tylenol for pyrexia   - f/u urethral discharge culture   - Ucx: neg  -  Bcx : neg  - am labs    # HTN  - c/w valsartan    # GERD  - protonix for omeprazole       D/W DR VALLEJO : D/C MRI AND F/U WITH ID FOR HOME ABX     D/W ID : CAN BE D/C WITH PO CIPRO BID X 4 WEEKS   PT EDUCATED ON LONG TERM CIPRO USE : ACHILLES TENDONITIS ( EKG WITH NL QTC )     PT WILL NEED CLOSE F/U WITH URO FOR RESULTS OF CX

## 2023-07-29 NOTE — DISCHARGE NOTE PROVIDER - HOSPITAL COURSE
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 Pt states he qap622.6F temp yesterday with 10/10 pain to his suprapubic region/base of penis area radiating to his rectum with reproducible white milky discharge from urethra.      # Fever  # Prostatitis   # Urethral discharge  - WBC WNL, afebrile, lactate neg  - c/w IVAB  - ID consult: d/w id d/c with 3 weeks of cipro ( pt has at home ) close f/u for final results of cx and out pt mri   pt informed of achilles tendonitis ( ekf reviewed qtc nl )  and fosfomycin 3g daily for 2 weeks   - tylenol for pyrexia   - urethral discharge culture : prelin kleb  - urine and blood cx neg       # HTN  - c/w valsartan    # GERD  - protonix for omeprazole     pt agrees with plan

## 2023-07-29 NOTE — DISCHARGE NOTE PROVIDER - NSDCMRMEDTOKEN_GEN_ALL_CORE_FT
Cipro 500 mg oral tablet: 1 tab(s) orally 2 times a day  fosfomycin 3 g oral granule for reconstitution: 1 each orally once a day  omeprazole 20 mg oral delayed release capsule: 1 cap(s) orally 2 times a day  valsartan 80 mg oral tablet: 1 tab(s) orally once a day

## 2023-07-29 NOTE — DISCHARGE NOTE PROVIDER - CARE PROVIDER_API CALL
Faizan Nguyễn [place]  Urology  [address] [place] [address], [address] [address]  Phone: [phone]  Fax: [phone]  Established Patient  Follow Up Time: 1-3 days

## 2023-07-29 NOTE — DISCHARGE NOTE NURSING/CASE MANAGEMENT/SOCIAL WORK - PATIENT PORTAL LINK FT
You can access the FollowMyHealth Patient Portal offered by [facility] by registering at the following website: [email or web address]. By joining [employer]’s FollowMyHealth portal, you will also be able to view your health information using other applications (apps) compatible with our system.

## 2023-07-30 ENCOUNTER — TRANSCRIPTION ENCOUNTER (OUTPATIENT)
Age: 43
End: 2023-07-30

## 2023-07-30 LAB
-  AMPICILLIN: SIGNIFICANT CHANGE UP
-  TETRACYCLINE: SIGNIFICANT CHANGE UP
-  VANCOMYCIN: SIGNIFICANT CHANGE UP
METHOD TYPE: SIGNIFICANT CHANGE UP

## 2023-08-01 LAB
CULTURE RESULTS: SIGNIFICANT CHANGE UP
ORGANISM # SPEC MICROSCOPIC CNT: SIGNIFICANT CHANGE UP
SPECIMEN SOURCE: SIGNIFICANT CHANGE UP

## 2023-08-02 LAB
CULTURE RESULTS: SIGNIFICANT CHANGE UP
SPECIMEN SOURCE: SIGNIFICANT CHANGE UP

## 2023-08-03 DIAGNOSIS — R50.9 FEVER, UNSPECIFIED: ICD-10-CM

## 2023-08-03 DIAGNOSIS — I10 ESSENTIAL (PRIMARY) HYPERTENSION: ICD-10-CM

## 2023-08-03 DIAGNOSIS — Z91.040 LATEX ALLERGY STATUS: ICD-10-CM

## 2023-08-03 DIAGNOSIS — Z88.1 ALLERGY STATUS TO OTHER ANTIBIOTIC AGENTS STATUS: ICD-10-CM

## 2023-08-03 DIAGNOSIS — N39.0 URINARY TRACT INFECTION, SITE NOT SPECIFIED: ICD-10-CM

## 2023-08-03 DIAGNOSIS — N41.9 INFLAMMATORY DISEASE OF PROSTATE, UNSPECIFIED: ICD-10-CM

## 2023-08-03 DIAGNOSIS — K21.9 GASTRO-ESOPHAGEAL REFLUX DISEASE WITHOUT ESOPHAGITIS: ICD-10-CM

## 2023-08-04 ENCOUNTER — TRANSCRIPTION ENCOUNTER (OUTPATIENT)
Age: 43
End: 2023-08-04

## 2023-08-10 DIAGNOSIS — N41.0 ACUTE PROSTATITIS: ICD-10-CM

## 2023-08-29 DIAGNOSIS — N36.9 URETHRAL DISORDER, UNSPECIFIED: ICD-10-CM

## 2023-09-05 ENCOUNTER — APPOINTMENT (OUTPATIENT)
Dept: CARDIOLOGY | Facility: CLINIC | Age: 43
End: 2023-09-05
Payer: COMMERCIAL

## 2023-09-05 VITALS
SYSTOLIC BLOOD PRESSURE: 146 MMHG | TEMPERATURE: 98.1 F | BODY MASS INDEX: 20.46 KG/M2 | DIASTOLIC BLOOD PRESSURE: 100 MMHG | HEART RATE: 65 BPM | HEIGHT: 68 IN | WEIGHT: 135 LBS | RESPIRATION RATE: 20 BRPM

## 2023-09-05 DIAGNOSIS — Z01.810 ENCOUNTER FOR PREPROCEDURAL CARDIOVASCULAR EXAMINATION: ICD-10-CM

## 2023-09-05 DIAGNOSIS — Z78.9 OTHER SPECIFIED HEALTH STATUS: ICD-10-CM

## 2023-09-05 DIAGNOSIS — I10 ESSENTIAL (PRIMARY) HYPERTENSION: ICD-10-CM

## 2023-09-05 PROCEDURE — 93000 ELECTROCARDIOGRAM COMPLETE: CPT

## 2023-09-05 PROCEDURE — 99204 OFFICE O/P NEW MOD 45 MIN: CPT

## 2023-09-05 RX ORDER — SULFAMETHOXAZOLE AND TRIMETHOPRIM 800; 160 MG/1; MG/1
800-160 TABLET ORAL
Qty: 28 | Refills: 0 | Status: COMPLETED | COMMUNITY
Start: 2022-08-15 | End: 2023-09-05

## 2023-09-05 RX ORDER — CHOLESTYRAMINE 4 G/5.5G
4 POWDER, FOR SUSPENSION ORAL TWICE DAILY
Qty: 360 | Refills: 0 | Status: COMPLETED | COMMUNITY
Start: 2022-01-21 | End: 2023-09-05

## 2023-09-05 RX ORDER — CIPROFLOXACIN HYDROCHLORIDE 500 MG/1
500 TABLET, FILM COATED ORAL
Qty: 6 | Refills: 0 | Status: COMPLETED | COMMUNITY
Start: 2022-11-04 | End: 2023-09-05

## 2023-09-05 RX ORDER — METHENAMINE HIPPURATE 1 G/1
1 TABLET ORAL TWICE DAILY
Qty: 180 | Refills: 3 | Status: ACTIVE | COMMUNITY
Start: 2022-05-23

## 2023-09-05 RX ORDER — SULFAMETHOXAZOLE AND TRIMETHOPRIM 800; 160 MG/1; MG/1
800-160 TABLET ORAL TWICE DAILY
Qty: 28 | Refills: 0 | Status: COMPLETED | COMMUNITY
Start: 2022-04-09 | End: 2023-09-05

## 2023-09-05 RX ORDER — HUMAN PAPILLOMAVIRUS 9-VALENT VACCINE, RECOMBINANT 30; 40; 60; 40; 20; 20; 20; 20; 20 UG/.5ML; UG/.5ML; UG/.5ML; UG/.5ML; UG/.5ML; UG/.5ML; UG/.5ML; UG/.5ML; UG/.5ML
INJECTION, SUSPENSION INTRAMUSCULAR
Qty: 1 | Refills: 2 | Status: COMPLETED | COMMUNITY
Start: 2022-06-06 | End: 2023-09-05

## 2023-09-05 RX ORDER — HUMAN PAPILLOMAVIRUS 9-VALENT VACCINE, RECOMBINANT 30; 40; 60; 40; 20; 20; 20; 20; 20 UG/.5ML; UG/.5ML; UG/.5ML; UG/.5ML; UG/.5ML; UG/.5ML; UG/.5ML; UG/.5ML; UG/.5ML
INJECTION, SUSPENSION INTRAMUSCULAR
Qty: 1 | Refills: 0 | Status: COMPLETED | COMMUNITY
Start: 2022-06-08 | End: 2023-09-05

## 2023-09-05 RX ORDER — SULFAMETHOXAZOLE AND TRIMETHOPRIM 800; 160 MG/1; MG/1
800-160 TABLET ORAL
Qty: 84 | Refills: 0 | Status: COMPLETED | COMMUNITY
Start: 2023-08-04 | End: 2023-09-05

## 2023-09-05 RX ORDER — METRONIDAZOLE 500 MG/1
500 TABLET ORAL TWICE DAILY
Qty: 20 | Refills: 0 | Status: COMPLETED | COMMUNITY
Start: 2023-02-09 | End: 2023-09-05

## 2023-09-05 RX ORDER — HUMAN PAPILLOMAVIRUS 9-VALENT VACCINE, RECOMBINANT 30; 40; 60; 40; 20; 20; 20; 20; 20 UG/.5ML; UG/.5ML; UG/.5ML; UG/.5ML; UG/.5ML; UG/.5ML; UG/.5ML; UG/.5ML; UG/.5ML
INJECTION, SUSPENSION INTRAMUSCULAR
Qty: 1 | Refills: 2 | Status: COMPLETED | COMMUNITY
Start: 2022-02-09 | End: 2023-09-05

## 2023-09-05 RX ORDER — SULFAMETHOXAZOLE AND TRIMETHOPRIM 800; 160 MG/1; MG/1
800-160 TABLET ORAL
Qty: 20 | Refills: 0 | Status: COMPLETED | COMMUNITY
Start: 2022-01-11 | End: 2023-09-05

## 2023-09-05 RX ORDER — DOXYCYCLINE 100 MG/1
100 CAPSULE ORAL
Qty: 28 | Refills: 0 | Status: COMPLETED | COMMUNITY
Start: 2023-04-24 | End: 2023-09-05

## 2023-09-05 RX ORDER — AMOXICILLIN 500 MG/1
500 CAPSULE ORAL TWICE DAILY
Qty: 60 | Refills: 0 | Status: COMPLETED | COMMUNITY
Start: 2022-02-09 | End: 2023-09-05

## 2023-09-05 RX ORDER — CIPROFLOXACIN HYDROCHLORIDE 500 MG/1
500 TABLET, FILM COATED ORAL
Qty: 28 | Refills: 1 | Status: COMPLETED | COMMUNITY
Start: 2023-07-26 | End: 2023-09-05

## 2023-09-05 RX ORDER — VALSARTAN 80 MG/1
80 TABLET, COATED ORAL
Refills: 0 | Status: ACTIVE | COMMUNITY

## 2023-09-05 RX ORDER — FOSFOMYCIN TROMETHAMINE 3 G/1
3 POWDER ORAL
Qty: 15 | Refills: 0 | Status: ACTIVE | COMMUNITY
Start: 2023-08-10

## 2023-09-05 RX ORDER — ESOMEPRAZOLE MAGNESIUM 40 MG/1
40 CAPSULE, DELAYED RELEASE ORAL TWICE DAILY
Qty: 60 | Refills: 3 | Status: ACTIVE | COMMUNITY
Start: 2022-01-21

## 2023-09-05 RX ORDER — SULFAMETHOXAZOLE AND TRIMETHOPRIM 800; 160 MG/1; MG/1
800-160 TABLET ORAL
Qty: 84 | Refills: 0 | Status: COMPLETED | COMMUNITY
Start: 2023-07-31 | End: 2023-09-05

## 2023-09-05 RX ORDER — HUMAN PAPILLOMAVIRUS 9-VALENT VACCINE, RECOMBINANT 30; 40; 60; 40; 20; 20; 20; 20; 20 UG/.5ML; UG/.5ML; UG/.5ML; UG/.5ML; UG/.5ML; UG/.5ML; UG/.5ML; UG/.5ML; UG/.5ML
INJECTION, SUSPENSION INTRAMUSCULAR
Qty: 1 | Refills: 2 | Status: COMPLETED | COMMUNITY
Start: 2022-02-07 | End: 2023-09-05

## 2023-09-05 RX ORDER — AMOXICILLIN AND CLAVULANATE POTASSIUM 875; 125 MG/1; MG/1
875-125 TABLET, COATED ORAL TWICE DAILY
Qty: 28 | Refills: 0 | Status: COMPLETED | COMMUNITY
Start: 2022-03-11 | End: 2023-09-05

## 2023-09-05 NOTE — HISTORY OF PRESENT ILLNESS
[FreeTextEntry1] : 44 y/o male with history of HTN, bladder exstrophy s/p closure, multiple urological surgeries, presents for evaluation for a cardiac clearance prior to cystoscopy. Patient reports no angina. No dyspnea on exertion or chest pain. No palpitations. BP is elevated today, but usually well controlled with Diovan. He did not take medication today. ET is over 4 METs. Recent admission to [facility] for urosepsis, still on antibiotic therapy.

## 2023-09-05 NOTE — ASSESSMENT
[FreeTextEntry1] : 42 y/o male with history and presentation as above.  Pre-op evaluation RCRI 0 No cardiac contraindications for the planned procedure Low risk. ET over 4 METs ECG - normal No indication for stress test.  HTN BP control discussed. C/w valsartan 80 mg. Low salt diet. If increase in BP, will increase the dose to 160, but he reports normal BP with his usual dose, so would keep the same for now.  Primary prevention.  Clearance letter placed in [employer].

## 2023-09-05 NOTE — REVIEW OF SYSTEMS
[Urinary Frequency] : urinary frequency [Pain During Urination] : dysuria [Negative] : Heme/Lymph [FreeTextEntry7] : GERD

## 2023-09-13 ENCOUNTER — TRANSCRIPTION ENCOUNTER (OUTPATIENT)
Age: 43
End: 2023-09-13

## 2023-09-14 ENCOUNTER — OUTPATIENT (OUTPATIENT)
Dept: OUTPATIENT SERVICES | Facility: HOSPITAL | Age: 43
LOS: 1 days | Discharge: HOME | End: 2023-09-14
Payer: COMMERCIAL

## 2023-09-14 ENCOUNTER — TRANSCRIPTION ENCOUNTER (OUTPATIENT)
Age: 43
End: 2023-09-14

## 2023-09-14 ENCOUNTER — APPOINTMENT (OUTPATIENT)
Dept: PEDIATRIC UROLOGY | Facility: AMBULATORY SURGERY CENTER | Age: 43
End: 2023-09-14

## 2023-09-14 VITALS
RESPIRATION RATE: 18 BRPM | OXYGEN SATURATION: 100 % | DIASTOLIC BLOOD PRESSURE: 90 MMHG | WEIGHT: 135.14 LBS | TEMPERATURE: 97 F | HEART RATE: 67 BPM | SYSTOLIC BLOOD PRESSURE: 134 MMHG

## 2023-09-14 VITALS
SYSTOLIC BLOOD PRESSURE: 131 MMHG | RESPIRATION RATE: 15 BRPM | HEART RATE: 68 BPM | TEMPERATURE: 97 F | OXYGEN SATURATION: 100 % | DIASTOLIC BLOOD PRESSURE: 71 MMHG

## 2023-09-14 DIAGNOSIS — Z90.49 ACQUIRED ABSENCE OF OTHER SPECIFIED PARTS OF DIGESTIVE TRACT: Chronic | ICD-10-CM

## 2023-09-14 DIAGNOSIS — Z98.890 OTHER SPECIFIED POSTPROCEDURAL STATES: Chronic | ICD-10-CM

## 2023-09-14 DIAGNOSIS — N36.9 URETHRAL DISORDER, UNSPECIFIED: ICD-10-CM

## 2023-09-14 PROCEDURE — 52214 CYSTOSCOPY AND TREATMENT: CPT

## 2023-09-14 DEVICE — GUIDEWIRE SENSOR DUAL-FLEX NITINOL STRAIGHT .038" X 150CM
Type: IMPLANTABLE DEVICE | Status: NON-FUNCTIONAL
Removed: 2023-09-14

## 2023-09-14 DEVICE — LASER FIBER SOLTIVE 365
Type: IMPLANTABLE DEVICE | Status: NON-FUNCTIONAL
Removed: 2023-09-14

## 2023-09-14 RX ORDER — SODIUM CHLORIDE 9 MG/ML
1000 INJECTION, SOLUTION INTRAVENOUS
Refills: 0 | Status: DISCONTINUED | OUTPATIENT
Start: 2023-09-14 | End: 2023-09-29

## 2023-09-14 NOTE — ASU DISCHARGE PLAN (ADULT/PEDIATRIC) - CARE PROVIDER_API CALL
Jatin, [facility]  Urology  [address] [place] [address] [address]  [place] [address]  Phone: [phone]  Fax: [phone]  Follow Up Time:

## 2023-09-14 NOTE — ASU DISCHARGE PLAN (ADULT/PEDIATRIC) - ASU DC SPECIAL INSTRUCTIONSFT
Pain control  - Over the coutner Tylenol every 6 hours and ibuprofen every 8 hours alternating    Activity  - No strenuous activity for 4 weeks    What to expect  - Red colored urine is normal, drink plenty of fluids to help clear the blood  - your child may have urge to urinate or discomfort of the urethra, this is normal especially with a indwelling ureteral stent, drink plenty of fluids and take medication as directed    When to call  - Call if persistent nausea, vomiting, fevers >38C, or shaking chills    Follow up  - As advised by Dr. Steiner depending on the case

## 2023-09-14 NOTE — ASU DISCHARGE PLAN (ADULT/PEDIATRIC) - NS MD DC FALL RISK RISK
For information on Fall & Injury Prevention, visit: [email or web address] OR  [email or web address] OR  https://www.cdc.gov/steadi/patient.html

## 2023-09-14 NOTE — ASU PREOP CHECKLIST - AS TEMP SITE
forehead Doxycycline Pregnancy And Lactation Text: This medication is Pregnancy Category D and not consider safe during pregnancy. It is also excreted in breast milk but is considered safe for shorter treatment courses. Birth Control Pills Pregnancy And Lactation Text: This medication should be avoided if pregnant and for the first 30 days post-partum. Tetracycline Pregnancy And Lactation Text: This medication is Pregnancy Category D and not consider safe during pregnancy. It is also excreted in breast milk. Use Enhanced Medication Counseling?: No Isotretinoin Pregnancy And Lactation Text: This medication is Pregnancy Category X and is considered extremely dangerous during pregnancy. It is unknown if it is excreted in breast milk. Topical Clindamycin Pregnancy And Lactation Text: This medication is Pregnancy Category B and is considered safe during pregnancy. It is unknown if it is excreted in breast milk. Topical Retinoid Pregnancy And Lactation Text: This medication is Pregnancy Category C. It is unknown if this medication is excreted in breast milk. Dapsone Counseling: I discussed with the patient the risks of dapsone including but not limited to hemolytic anemia, agranulocytosis, rashes, methemoglobinemia, kidney failure, peripheral neuropathy, headaches, GI upset, and liver toxicity. Patients who start dapsone require monitoring including baseline LFTs and weekly CBCs for the first month, then every month thereafter. The patient verbalized understanding of the proper use and possible adverse effects of dapsone. All of the patient's questions and concerns were addressed. Bactrim Counseling:  I discussed with the patient the risks of sulfa antibiotics including but not limited to GI upset, allergic reaction, drug rash, diarrhea, dizziness, photosensitivity, and yeast infections. Rarely, more serious reactions can occur including but not limited to aplastic anemia, agranulocytosis, methemoglobinemia, blood dyscrasias, liver or kidney failure, lung infiltrates or desquamative/blistering drug rashes. Spironolactone Counseling: Patient advised regarding risks of diarrhea, abdominal pain, hyperkalemia, birth defects (for female patients), liver toxicity and renal toxicity. The patient may need blood work to monitor liver and kidney function and potassium levels while on therapy. The patient verbalized understanding of the proper use and possible adverse effects of spironolactone. All of the patient's questions and concerns were addressed. High Dose Vitamin A Counseling: Side effects reviewed, pt to contact office should one occur. Erythromycin Counseling:  I discussed with the patient the risks of erythromycin including but not limited to GI upset, allergic reaction, drug rash, diarrhea, increase in liver enzymes, and yeast infections. Topical Sulfur Applications Counseling: Topical Sulfur Counseling: Patient counseled that this medication may cause skin irritation or allergic reactions. In the event of skin irritation, the patient was advised to reduce the amount of the drug applied or use it less frequently. The patient verbalized understanding of the proper use and possible adverse effects of topical sulfur application. All of the patient's questions and concerns were addressed. Tazorac Counseling:  Patient advised that medication is irritating and drying. Patient may need to apply sparingly and wash off after an hour before eventually leaving it on overnight. The patient verbalized understanding of the proper use and possible adverse effects of tazorac. All of the patient's questions and concerns were addressed. Benzoyl Peroxide Counseling: Patient counseled that medicine may cause skin irritation and bleach clothing. In the event of skin irritation, the patient was advised to reduce the amount of the drug applied or use it less frequently. The patient verbalized understanding of the proper use and possible adverse effects of benzoyl peroxide. All of the patient's questions and concerns were addressed. Detail Level: Zone Erythromycin Pregnancy And Lactation Text: This medication is Pregnancy Category B and is considered safe during pregnancy. It is also excreted in breast milk. Dapsone Pregnancy And Lactation Text: This medication is Pregnancy Category C and is not considered safe during pregnancy or breast feeding. Bactrim Pregnancy And Lactation Text: This medication is Pregnancy Category D and is known to cause fetal risk. It is also excreted in breast milk. Spironolactone Pregnancy And Lactation Text: This medication can cause feminization of the male fetus and should be avoided during pregnancy. The active metabolite is also found in breast milk. High Dose Vitamin A Pregnancy And Lactation Text: High dose vitamin A therapy is contraindicated during pregnancy and breast feeding. Topical Sulfur Applications Pregnancy And Lactation Text: This medication is Pregnancy Category C and has an unknown safety profile during pregnancy. It is unknown if this topical medication is excreted in breast milk. Tazorac Pregnancy And Lactation Text: This medication is not safe during pregnancy. It is unknown if this medication is excreted in breast milk. Benzoyl Peroxide Pregnancy And Lactation Text: This medication is Pregnancy Category C. It is unknown if benzoyl peroxide is excreted in breast milk. Doxycycline Counseling:  Patient counseled regarding possible photosensitivity and increased risk for sunburn. Patient instructed to avoid sunlight, if possible. When exposed to sunlight, patients should wear protective clothing, sunglasses, and sunscreen. The patient was instructed to call the office immediately if the following severe adverse effects occur:  hearing changes, easy bruising/bleeding, severe headache, or vision changes. The patient verbalized understanding of the proper use and possible adverse effects of doxycycline. All of the patient's questions and concerns were addressed. Birth Control Pills Counseling: Birth Control Pill Counseling: I discussed with the patient the potential side effects of OCPs including but not limited to increased risk of stroke, heart attack, thrombophlebitis, deep venous thrombosis, hepatic adenomas, breast changes, GI upset, headaches, and depression. The patient verbalized understanding of the proper use and possible adverse effects of OCPs. All of the patient's questions and concerns were addressed. Azithromycin Counseling:  I discussed with the patient the risks of azithromycin including but not limited to GI upset, allergic reaction, drug rash, diarrhea, and yeast infections. Tetracycline Counseling: Patient counseled regarding possible photosensitivity and increased risk for sunburn. Patient instructed to avoid sunlight, if possible. When exposed to sunlight, patients should wear protective clothing, sunglasses, and sunscreen. The patient was instructed to call the office immediately if the following severe adverse effects occur:  hearing changes, easy bruising/bleeding, severe headache, or vision changes. The patient verbalized understanding of the proper use and possible adverse effects of tetracycline. All of the patient's questions and concerns were addressed. Patient understands to avoid pregnancy while on therapy due to potential birth defects. Minocycline Counseling: Patient advised regarding possible photosensitivity and discoloration of the teeth, skin, lips, tongue and gums. Patient instructed to avoid sunlight, if possible. When exposed to sunlight, patients should wear protective clothing, sunglasses, and sunscreen. The patient was instructed to call the office immediately if the following severe adverse effects occur:  hearing changes, easy bruising/bleeding, severe headache, or vision changes. The patient verbalized understanding of the proper use and possible adverse effects of minocycline. All of the patient's questions and concerns were addressed. Isotretinoin Counseling: Patient should get monthly blood tests, not donate blood, not drive at night if vision affected, not share medication, and not undergo elective surgery for 6 months after tx completed. Side effects reviewed, pt to contact office should one occur. Topical Clindamycin Counseling: Patient counseled that this medication may cause skin irritation or allergic reactions. In the event of skin irritation, the patient was advised to reduce the amount of the drug applied or use it less frequently. The patient verbalized understanding of the proper use and possible adverse effects of clindamycin. All of the patient's questions and concerns were addressed. Topical Retinoid counseling:  Patient advised to apply a pea-sized amount only at bedtime and wait 30 minutes after washing their face before applying. If too drying, patient may add a non-comedogenic moisturizer. The patient verbalized understanding of the proper use and possible adverse effects of retinoids. All of the patient's questions and concerns were addressed. Azithromycin Pregnancy And Lactation Text: This medication is considered safe during pregnancy and is also secreted in breast milk.

## 2023-09-14 NOTE — ASU DISCHARGE PLAN (ADULT/PEDIATRIC) - MODE OF TRANSPORTATION
65,  or if a woman has a hysterectomy for a benign condition. Your provider may recommend more frequent testing if certain abnormal results are found.    Bone Mass Measurements:  · A painless x-ray of your bone density to see if you are at risk for a broken bone. Bone density refers to the thickness of bones or how tightly the bone tissue is packed.    Preventive Screening tests for Men    Prostate Screening:  · Should you have a prostate cancer test (PSA)?  It is up to you to decide if you want a prostate cancer test. Talk to your clinician to find out if the test is right for you.  Things for you to consider and talk about should include:  · Benefits and harms of the test  · Your family history  · How your race/ethnicity may influence the test  · If the test may impact other medical conditions you have  · Your values on screenings and treatments    *Medicare pays for many preventive services to keep you healthy. For some of these services, you might have to pay a deductible, coinsurance, and / or copayment.  The amounts vary depending on the type of services you need and the kind of Medicare health plan you have.    For further details on screenings offered by Medicare please visit: https://www.medicare.gov/coverage/preventive-screening-services    Ambulatory

## 2023-10-24 RX ORDER — AMOXICILLIN 500 MG/1
500 TABLET, FILM COATED ORAL 3 TIMES DAILY
Qty: 180 | Refills: 3 | Status: ACTIVE | COMMUNITY
Start: 2023-10-24 | End: 1900-01-01

## 2023-10-24 RX ORDER — SULFAMETHOXAZOLE AND TRIMETHOPRIM 800; 160 MG/1; MG/1
800-160 TABLET ORAL TWICE DAILY
Qty: 60 | Refills: 3 | Status: ACTIVE | COMMUNITY
Start: 2023-10-24 | End: 1900-01-01

## 2023-10-26 LAB — BACTERIA SPEC CULT: ABNORMAL

## 2023-11-27 RX ORDER — FOSFOMYCIN TROMETHAMINE 3 G/1
3 POWDER ORAL AT BEDTIME
Qty: 10 | Refills: 1 | Status: ACTIVE | COMMUNITY
Start: 2023-11-27 | End: 1900-01-01

## 2024-01-03 ENCOUNTER — RESULT REVIEW (OUTPATIENT)
Age: 44
End: 2024-01-03

## 2024-01-03 ENCOUNTER — OUTPATIENT (OUTPATIENT)
Dept: OUTPATIENT SERVICES | Facility: HOSPITAL | Age: 44
LOS: 1 days | End: 2024-01-03
Payer: COMMERCIAL

## 2024-01-03 DIAGNOSIS — Z98.890 OTHER SPECIFIED POSTPROCEDURAL STATES: Chronic | ICD-10-CM

## 2024-01-03 DIAGNOSIS — Z00.8 ENCOUNTER FOR OTHER GENERAL EXAMINATION: ICD-10-CM

## 2024-01-03 DIAGNOSIS — N36.9 URETHRAL DISORDER, UNSPECIFIED: ICD-10-CM

## 2024-01-03 DIAGNOSIS — Z90.49 ACQUIRED ABSENCE OF OTHER SPECIFIED PARTS OF DIGESTIVE TRACT: Chronic | ICD-10-CM

## 2024-01-03 PROCEDURE — A9579: CPT

## 2024-01-03 PROCEDURE — 72197 MRI PELVIS W/O & W/DYE: CPT

## 2024-01-03 PROCEDURE — 72197 MRI PELVIS W/O & W/DYE: CPT | Mod: 26

## 2024-01-04 DIAGNOSIS — N36.9 URETHRAL DISORDER, UNSPECIFIED: ICD-10-CM

## 2024-01-08 ENCOUNTER — NON-APPOINTMENT (OUTPATIENT)
Age: 44
End: 2024-01-08

## 2024-01-09 RX ORDER — AMOXICILLIN AND CLAVULANATE POTASSIUM 875; 125 MG/1; MG/1
875-125 TABLET, COATED ORAL TWICE DAILY
Qty: 28 | Refills: 0 | Status: ACTIVE | COMMUNITY
Start: 2023-09-11 | End: 1900-01-01

## 2024-01-09 RX ORDER — AMOXICILLIN AND CLAVULANATE POTASSIUM 500; 125 MG/1; MG/1
500-125 TABLET, FILM COATED ORAL TWICE DAILY
Qty: 28 | Refills: 0 | Status: DISCONTINUED | COMMUNITY
Start: 2023-04-24 | End: 2024-01-09

## 2024-01-12 RX ORDER — CEFPODOXIME PROXETIL 200 MG/1
200 TABLET, FILM COATED ORAL
Qty: 14 | Refills: 0 | Status: ACTIVE | COMMUNITY
Start: 2024-01-12 | End: 1900-01-01

## 2024-01-16 LAB
BACTERIA SPEC CULT: ABNORMAL
URINE CULTURE <10: ABNORMAL

## 2024-01-24 ENCOUNTER — TRANSCRIPTION ENCOUNTER (OUTPATIENT)
Age: 44
End: 2024-01-24

## 2024-01-24 ENCOUNTER — RESULT REVIEW (OUTPATIENT)
Age: 44
End: 2024-01-24

## 2024-01-24 ENCOUNTER — OUTPATIENT (OUTPATIENT)
Dept: OUTPATIENT SERVICES | Facility: HOSPITAL | Age: 44
LOS: 1 days | Discharge: HOME | End: 2024-01-24
Payer: COMMERCIAL

## 2024-01-24 DIAGNOSIS — Z98.890 OTHER SPECIFIED POSTPROCEDURAL STATES: Chronic | ICD-10-CM

## 2024-01-24 DIAGNOSIS — N39.0 URINARY TRACT INFECTION, SITE NOT SPECIFIED: ICD-10-CM

## 2024-01-24 DIAGNOSIS — Z90.49 ACQUIRED ABSENCE OF OTHER SPECIFIED PARTS OF DIGESTIVE TRACT: Chronic | ICD-10-CM

## 2024-01-24 PROCEDURE — 36410 VNPNXR 3YR/> PHY/QHP DX/THER: CPT

## 2024-01-24 PROCEDURE — C1751: CPT

## 2024-01-24 PROCEDURE — 76937 US GUIDE VASCULAR ACCESS: CPT | Mod: 26

## 2024-01-24 PROCEDURE — 76937 US GUIDE VASCULAR ACCESS: CPT

## 2024-01-24 NOTE — ASU DISCHARGE PLAN (ADULT/PEDIATRIC) - ASU DC SPECIAL INSTRUCTIONSFT
bard powerglide ST midline placed in LUE, 8g 10cm, ok for immediate use   keep dressing clean and dry  follow up as needed: [phone]

## 2024-01-24 NOTE — PROGRESS NOTE ADULT - SUBJECTIVE AND OBJECTIVE BOX
PREOPERATIVE DAY OF PROCEDURE EVALUATION:     43yo M with hx recurrent UTI presents today for midline for prolonged course of antibiotics.      Plan is for image guided midline placement today 1/24    Procedure/ risks/ benefits/ goals/ alternatives were explained. All questions answered. Informed content obtained from patient. Consent placed in chart.

## 2024-01-25 ENCOUNTER — OUTPATIENT (OUTPATIENT)
Dept: OUTPATIENT SERVICES | Facility: HOSPITAL | Age: 44
LOS: 1 days | Discharge: HOME | End: 2024-01-25
Payer: COMMERCIAL

## 2024-01-25 ENCOUNTER — TRANSCRIPTION ENCOUNTER (OUTPATIENT)
Age: 44
End: 2024-01-25

## 2024-01-25 ENCOUNTER — RESULT REVIEW (OUTPATIENT)
Age: 44
End: 2024-01-25

## 2024-01-25 DIAGNOSIS — N39.0 URINARY TRACT INFECTION, SITE NOT SPECIFIED: ICD-10-CM

## 2024-01-25 DIAGNOSIS — Z98.890 OTHER SPECIFIED POSTPROCEDURAL STATES: Chronic | ICD-10-CM

## 2024-01-25 DIAGNOSIS — Z90.49 ACQUIRED ABSENCE OF OTHER SPECIFIED PARTS OF DIGESTIVE TRACT: Chronic | ICD-10-CM

## 2024-01-25 PROCEDURE — 36573 INSJ PICC RS&I 5 YR+: CPT

## 2024-01-25 PROCEDURE — C1751: CPT

## 2024-01-25 NOTE — PROCEDURE NOTE - ADDITIONAL PROCEDURE DETAILS
Bard PowerPICC used  Tip in SVC, 35cm, ok to use  *pre-existing 10cm LUE midline exchanged for new 5Fr single lumen PICC line*

## 2024-01-25 NOTE — ASU DISCHARGE PLAN (ADULT/PEDIATRIC) - ASU DC SPECIAL INSTRUCTIONSFT
Bard PowerPICC used  Tip in SVC, 35cm, ok for immediate use  pre-existing 10cm LUE midline exchanged for new 5Fr single lumen PICC line   do not get wet, use protective covering during showers  follow up as needed: [phone]

## 2024-01-25 NOTE — PROGRESS NOTE ADULT - SUBJECTIVE AND OBJECTIVE BOX
PREOPERATIVE DAY OF PROCEDURE EVALUATION:     43yo M with hx recurrent UTI presents today for midline for prolonged course of antibiotics. Patient s/p LUE midline placement yesterday 1/24. Post procedure and patient discharge, received call from Dr. Haney office stating the infusion company will not administer patients medication via midline and he will need a PICC line instead. Patient presenting today for PICC line placement.    Plan for LUE midline exchange for PICC line today 1/25.     Procedure/ risks/ benefits/ goals/ alternatives were explained. All questions answered. Informed content obtained from patient. Consent placed in chart.

## 2024-01-26 DIAGNOSIS — N39.0 URINARY TRACT INFECTION, SITE NOT SPECIFIED: ICD-10-CM

## 2024-01-30 DIAGNOSIS — Z45.2 ENCOUNTER FOR ADJUSTMENT AND MANAGEMENT OF VASCULAR ACCESS DEVICE: ICD-10-CM

## 2024-02-04 ENCOUNTER — TRANSCRIPTION ENCOUNTER (OUTPATIENT)
Age: 44
End: 2024-02-04

## 2024-02-05 ENCOUNTER — APPOINTMENT (OUTPATIENT)
Dept: PEDIATRIC UROLOGY | Facility: AMBULATORY SURGERY CENTER | Age: 44
End: 2024-02-05

## 2024-02-05 ENCOUNTER — TRANSCRIPTION ENCOUNTER (OUTPATIENT)
Age: 44
End: 2024-02-05

## 2024-02-05 ENCOUNTER — OUTPATIENT (OUTPATIENT)
Dept: OUTPATIENT SERVICES | Facility: HOSPITAL | Age: 44
LOS: 1 days | Discharge: HOME | End: 2024-02-05
Payer: COMMERCIAL

## 2024-02-05 VITALS
WEIGHT: 143.96 LBS | RESPIRATION RATE: 18 BRPM | SYSTOLIC BLOOD PRESSURE: 131 MMHG | OXYGEN SATURATION: 100 % | HEART RATE: 87 BPM | DIASTOLIC BLOOD PRESSURE: 91 MMHG | TEMPERATURE: 98 F | HEIGHT: 68 IN

## 2024-02-05 VITALS
OXYGEN SATURATION: 99 % | TEMPERATURE: 98 F | DIASTOLIC BLOOD PRESSURE: 78 MMHG | HEART RATE: 79 BPM | SYSTOLIC BLOOD PRESSURE: 118 MMHG

## 2024-02-05 DIAGNOSIS — Z98.890 OTHER SPECIFIED POSTPROCEDURAL STATES: Chronic | ICD-10-CM

## 2024-02-05 DIAGNOSIS — Z90.49 ACQUIRED ABSENCE OF OTHER SPECIFIED PARTS OF DIGESTIVE TRACT: Chronic | ICD-10-CM

## 2024-02-05 DIAGNOSIS — N36.9 URETHRAL DISORDER, UNSPECIFIED: ICD-10-CM

## 2024-02-05 PROCEDURE — 52214 CYSTOSCOPY AND TREATMENT: CPT

## 2024-02-05 DEVICE — STONE BASKET ZEROTIP NITINOL 4-WIRE 1.9FR 120CM X 12MM
Type: IMPLANTABLE DEVICE | Status: NON-FUNCTIONAL
Removed: 2024-02-05

## 2024-02-05 DEVICE — LASER FIBER SOLTIVE 365
Type: IMPLANTABLE DEVICE | Status: NON-FUNCTIONAL
Removed: 2024-02-05

## 2024-02-05 RX ORDER — OMEPRAZOLE 10 MG/1
1 CAPSULE, DELAYED RELEASE ORAL
Refills: 0 | DISCHARGE

## 2024-02-05 NOTE — H&P PST ADULT - PROBLEM SELECTOR PLAN 1
Patient tentatively scheduled for Cystoscopy/Laser epilation on 2/5/24.  Copy of CBC, CMP, EKG and Urine culture in chart.

## 2024-02-05 NOTE — ASU DISCHARGE PLAN (ADULT/PEDIATRIC) - CONDITION AT DISCHARGE
[de-identified] : was Dx with sinus infection last week and put on Amox, as per mom he has been feeling better but cough is worse, was sen t home from school for cough. Difficulty sleeping and breathing at night. [FreeTextEntry6] : coughing  x 1 week seems a little worse the past 2 days, congestion has improved after starting Amoxil for sinusitis.  No fevers, no ST, vomiting. Stable

## 2024-02-05 NOTE — H&P PST ADULT - NEGATIVE ENMT SYMPTOMS
Denies dentures. Denies loose teeth./no hearing difficulty/no ear pain/no tinnitus/no vertigo/no sinus symptoms/no nasal congestion/no gum bleeding/no dry mouth/no throat pain/no dysphagia

## 2024-02-05 NOTE — H&P PST ADULT - NSICDXPASTSURGICALHX_GEN_ALL_CORE_FT
PAST SURGICAL HISTORY:  H/O arthroscopy of left knee    H/O cystoscopy multiple    History of bladder surgery multiple bladder reconstruction surgery [id number]    History of colon resection 1980's    S/P small bowel resection 1999, 2010

## 2024-02-05 NOTE — H&P PST ADULT - HISTORY OF PRESENT ILLNESS
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 Pt c/o bladder spasms, grey urethral discharge 2 weeks ago and was found to Kliebsiella in urine, started Augmentin and now currently on Daptomycin and Ceftriaxone presents for pre-op evaluation for diagnosis of Urethral disorders unspecified. Patient is scheduled for Cystoscopy/Laser epilation. Patient denies any symptoms today.  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 Pt c/o pelvic spasms, grey urethral discharge 2 weeks ago and was found to Kliebsiella in urine, started Augmentin and now currently on Daptomycin and Ceftriaxone presents for pre-op evaluation for diagnosis of Urethral disorders unspecified. Patient is scheduled for Cystoscopy/Laser epilation. Patient denies any symptoms today.

## 2024-02-05 NOTE — ASU DISCHARGE PLAN (ADULT/PEDIATRIC) - PAIN MANAGEMENT
alternate tylenol with motrin for pain, next dose of tylenol can be taken at 930pm, continue antibiotic at home

## 2024-02-05 NOTE — H&P PST ADULT - WEIGHT IN KG
65.3 When discharged, take only medications prescribed as instructed by your hospital provider    Do not stop or change any medications until you discuss changes with your own prescriber    Do not take any other medications, including left over medications from before your admission, over the counter medications or herbal supplements, unless you discuss with your own provider

## 2024-02-05 NOTE — H&P PST ADULT - NSICDXPASTMEDICALHX_GEN_ALL_CORE_FT
PAST MEDICAL HISTORY:  Bladder disease or syndrome congenital bladder exstrophy    Eczema     GERD (gastroesophageal reflux disease)     H/O chronic prostatitis     H/O prostatitis     HTN (hypertension)     Recurrent urinary tract infection     SBO (small bowel obstruction)     Urethral disorder, unspecified

## 2024-02-05 NOTE — H&P PST ADULT - NEGATIVE GENERAL GENITOURINARY SYMPTOMS
patient self catheterize 6 x day, c/o bladder spasms, urethral discharge/no hematuria/no flank pain L/no flank pain R/no incontinence/no urinary hesitancy/no nocturia patient self catheterize 6 x day, c/o pelvic spasms, urethral discharge/no hematuria/no flank pain L/no flank pain R/no incontinence/no urinary hesitancy/no nocturia

## 2024-02-05 NOTE — ASU DISCHARGE PLAN (ADULT/PEDIATRIC) - FOLLOW UP APPOINTMENTS
[facility], Ambulatory Surgical Unit [facility] Medicine ([facility])

## 2024-02-05 NOTE — H&P PST ADULT - NSANTHTIREDRD_ENT_A_CORE
Per verbal order from Dr. Bolton, pt can have CBC monthly unless symptomatic. Procrit to be held for hgb >11.   No

## 2024-02-23 PROBLEM — K56.609 UNSPECIFIED INTESTINAL OBSTRUCTION, UNSPECIFIED AS TO PARTIAL VERSUS COMPLETE OBSTRUCTION: Chronic | Status: ACTIVE | Noted: 2024-02-05

## 2024-02-23 PROBLEM — N36.9 URETHRAL DISORDER, UNSPECIFIED: Chronic | Status: ACTIVE | Noted: 2024-02-05

## 2024-02-23 PROBLEM — Z87.438 PERSONAL HISTORY OF OTHER DISEASES OF MALE GENITAL ORGANS: Chronic | Status: ACTIVE | Noted: 2024-02-05

## 2024-03-04 NOTE — H&P PST ADULT - ALCOHOL USE HISTORY SINGLE SELECT
"Chief Complaint  Hernia (Discuss hernia )    Subjective         [place] HALL presents to [facility]  Presents to the office today to discuss her once the patient as well as her epigastric pain.  Patient does state that she would like for me to start feeling her pantoprazole and her Carafate.  She does state that she is still struggling with her constipation.  I did discuss doing MiraLAX daily to help keep the stool soft and and her bowel movements regular.  Patient states that she has tried stool softeners in the past without any improvement.  She also states that she would like to start medication for her hot flashes.  I did discuss starting Zoloft daily.  Patient states that she is willing to try this.  I did explain to the patient that if her hiatal hernia becomes worse where she is unable to swallow liquids or solids then we would refer her to Dr. Nam for further evaluation and possible treatment.  She verbalized understanding.    Hernia         Objective     Vitals:    03/04/24 1008   BP: 114/72   BP Location: Right arm   Patient Position: Sitting   Cuff Size: Adult   Pulse: 106   Temp: 96.3 °F (35.7 °C)   TempSrc: Temporal   SpO2: 98%   Weight: 58.1 kg (128 lb)   Height: 152.4 cm (60\")      Body mass index is 25 kg/m².             Physical Exam  Vitals reviewed.   Constitutional:       Appearance: Normal appearance.   Cardiovascular:      Rate and Rhythm: Normal rate and regular rhythm.      Pulses: Normal pulses.      Heart sounds: Normal heart sounds, S1 normal and S2 normal. No murmur heard.  Pulmonary:      Effort: Pulmonary effort is normal. No respiratory distress.      Breath sounds: Normal breath sounds.   Skin:     General: Skin is warm and dry.   Neurological:      Mental Status: She is alert and oriented to person, place, and time.   Psychiatric:         Attention and Perception: Attention normal.         Mood and Affect: Mood normal.         Behavior: " Behavior normal.          Result Review :   The following data was reviewed by: TRENTON Brady on 03/04/2024:      Procedures    Assessment and Plan   Diagnoses and all orders for this visit:    1. Hypothyroidism, unspecified type (Primary)  -     CBC (No Diff); Future  -     Comprehensive Metabolic Panel; Future  -     TSH; Future    2. Polyarthralgia  -     Rheumatoid Arthritis (RA) Profile; Future  -     TSH; Future    3. Hyperlipidemia, unspecified hyperlipidemia type  -     CBC (No Diff); Future  -     Comprehensive Metabolic Panel; Future  -     Lipid Panel; Future    4. Gastroesophageal reflux disease without esophagitis  -     pantoprazole (PROTONIX) 40 MG EC tablet; Take 1 tablet by mouth Daily.  Dispense: 90 tablet; Refill: 0  -     sucralfate (CARAFATE) 1 g tablet; Take 1 tablet by mouth Every 12 (Twelve) Hours.  Dispense: 60 tablet; Refill: 0    5. Constipation, unspecified constipation type  -     polyethylene glycol (MIRALAX) 17 g packet; Take 17 g by mouth Daily.  Dispense: 30 packet; Refill: 1          Follow Up   Return if symptoms worsen or fail to improve.  Patient was given instructions and counseling regarding her condition or for health maintenance advice. Please see specific information pulled into the AVS if appropriate.        never

## 2024-03-05 RX ORDER — NITROFURANTOIN (MONOHYDRATE/MACROCRYSTALS) 25; 75 MG/1; MG/1
100 CAPSULE ORAL
Qty: 90 | Refills: 1 | Status: ACTIVE | COMMUNITY
Start: 2024-02-07 | End: 1900-01-01

## 2024-03-12 NOTE — PRE-ANESTHESIA EVALUATION ADULT - NSANTHDIETYNSD_GEN_ALL_CORE
Physical Therapy Discharge    Visit Type: Discharge Summary  Visit: 5  Referring Provider: Trae Ryan MD  Medical Diagnosis (from order): M76.11 - Psoas tendinitis, right hip     SUBJECTIVE                                                                                                               Right hip pain the same.      OBJECTIVE                                                                                                                     Range of Motion (ROM)   (degrees unless noted; active unless noted; norms in ( ); negative=lacking to 0, positive=beyond 0)  Hip:   - Flexion (100-120):       Right: 105     - Extension (20-30):       Right:  0   Passive: 5         Palpation  Right  - Proximal Rectus Femoris: hypotonic and trigger point              Outcome/Assessments  Outcome Measures:   HOOS, JR Raw Score: 5  HOOS Jr Calculated Score: 73.47  (interval score: 0=total hip disability to 100=perfect hip health) see flowsheet for additional documentation        Treatment     Therapeutic Exercise  Ther Ex  Patient was instructed in parameters and technique with specific demonstration of the following activities to develop strength, endurance, range of motion, and muscle/joint flexibility to improve function, progress towards goals, and maximize independence with daily tasks.  · Exercises: SciFit 8 minutes   Lumbar/ hip flexion extension on Repex table position 7 for 14 minutes.  Right LOWER EXTREMITY Fantasma stretch to hip flexion then hip flexion stretch 3 sets of 10            Manual Therapy   Patient was seen for tissue and joint mobilizations to work on progress in mobility, flexibility and pain:    Soft/Deep tissue mobilization to: Right quads, right pectineus, gluts and scar mob.    Post-Treatment: improvement in soft tissue extensibility, decreased pain, muscle symmetry, capsular end feel, or general mobility with observed improvements in all function end of session after treatment for  progress towards set goals.    Skilled input: verbal instruction/cues and posture correction    Writer verbally educated and received verbal consent for hand placement, positioning of patient, and techniques to be performed today from patient for therapist position for techniques as described above and how they are pertinent to the patient's plan of care.      ASSESSMENT                                                                                                            Patient had relieve of right anterior hip pain with Right LOWER EXTREMITY Fantasma stretch to hip flexion then hip flexion stretch. Discharged with home exercises.  Pain/symptoms after session (out of 10): 0  Education:   - Results of above outlined education: Verbalizes understanding and Demonstrates understanding    Goals  Decrease right quad and iliopsoas spasm  The above improvements in impairments to assist in obtaining goals listed below  Long Term Goals: to be met by end of plan of care  1. Drive with reduced hip pain 75%. Status: not met Pain still present with driving.  2. Able to transfer out of car without pain. Status: progressing/ongoing  3. Patient will demonstrate ability to negotiate level and unlevel surfaces at variable velocities, including change of direction without increased pain or instability to return to age appropriate and community activities at prior level of function. Status: met   4. HOOS jr: Patient will score 20% or higher on the HOOS Jr. to indicate improvement with walking and moving around related to hip arthroplasty. (minimal clinically important difference: 18% of calculated score)  Status: met   5. Patient will be independent with progressed and modified home exercise program. Status: met       Therapy procedure time and total treatment time can be found documented on the Time Entry flowsheet     Yes

## 2024-03-13 LAB
ALBUMIN SERPL ELPH-MCNC: 4.8 G/DL
ALP BLD-CCNC: 121 U/L
ALT SERPL-CCNC: 13 U/L
ANION GAP SERPL CALC-SCNC: 13 MMOL/L
AST SERPL-CCNC: 19 U/L
BILIRUB SERPL-MCNC: 0.5 MG/DL
BUN SERPL-MCNC: 20 MG/DL
CALCIUM SERPL-MCNC: 9.8 MG/DL
CHLORIDE SERPL-SCNC: 103 MMOL/L
CO2 SERPL-SCNC: 24 MMOL/L
CREAT SERPL-MCNC: 1.3 MG/DL
EGFR: 70 ML/MIN/1.73M2
GLUCOSE SERPL-MCNC: 89 MG/DL
HCT VFR BLD CALC: 45.5 %
HGB BLD-MCNC: 14.4 G/DL
MCHC RBC-ENTMCNC: 25.9 PG
MCHC RBC-ENTMCNC: 31.6 G/DL
MCV RBC AUTO: 81.8 FL
PLATELET # BLD AUTO: 346 K/UL
PMV BLD AUTO: 0 /100 WBCS
PMV BLD: 10.1 FL
POTASSIUM SERPL-SCNC: 5 MMOL/L
PROT SERPL-MCNC: 7.3 G/DL
RBC # BLD: 5.56 M/UL
RBC # FLD: 13.9 %
SODIUM SERPL-SCNC: 140 MMOL/L
WBC # FLD AUTO: 5.79 K/UL

## 2024-03-15 LAB
BACTERIA SPEC CULT: ABNORMAL
URINE CULTURE <10: NORMAL

## 2024-03-27 ENCOUNTER — TRANSCRIPTION ENCOUNTER (OUTPATIENT)
Age: 44
End: 2024-03-27

## 2024-03-28 ENCOUNTER — RESULT REVIEW (OUTPATIENT)
Age: 44
End: 2024-03-28

## 2024-03-28 ENCOUNTER — APPOINTMENT (OUTPATIENT)
Dept: PEDIATRIC UROLOGY | Facility: AMBULATORY SURGERY CENTER | Age: 44
End: 2024-03-28

## 2024-03-28 ENCOUNTER — OUTPATIENT (OUTPATIENT)
Dept: OUTPATIENT SERVICES | Facility: HOSPITAL | Age: 44
LOS: 1 days | Discharge: HOME | End: 2024-03-28
Payer: COMMERCIAL

## 2024-03-28 ENCOUNTER — TRANSCRIPTION ENCOUNTER (OUTPATIENT)
Age: 44
End: 2024-03-28

## 2024-03-28 VITALS
SYSTOLIC BLOOD PRESSURE: 147 MMHG | DIASTOLIC BLOOD PRESSURE: 91 MMHG | HEIGHT: 67 IN | WEIGHT: 146.17 LBS | OXYGEN SATURATION: 100 % | TEMPERATURE: 98 F | HEART RATE: 66 BPM | RESPIRATION RATE: 16 BRPM

## 2024-03-28 VITALS
OXYGEN SATURATION: 98 % | DIASTOLIC BLOOD PRESSURE: 94 MMHG | SYSTOLIC BLOOD PRESSURE: 134 MMHG | RESPIRATION RATE: 18 BRPM | TEMPERATURE: 98 F | HEART RATE: 66 BPM

## 2024-03-28 DIAGNOSIS — Z98.890 OTHER SPECIFIED POSTPROCEDURAL STATES: Chronic | ICD-10-CM

## 2024-03-28 DIAGNOSIS — N36.9 URETHRAL DISORDER, UNSPECIFIED: ICD-10-CM

## 2024-03-28 DIAGNOSIS — Z90.49 ACQUIRED ABSENCE OF OTHER SPECIFIED PARTS OF DIGESTIVE TRACT: Chronic | ICD-10-CM

## 2024-03-28 PROCEDURE — 88305 TISSUE EXAM BY PATHOLOGIST: CPT | Mod: 26

## 2024-03-28 PROCEDURE — 52214 CYSTOSCOPY AND TREATMENT: CPT

## 2024-03-28 DEVICE — STENT URETH FIRLIT KLUG 8FRX30CM
Type: IMPLANTABLE DEVICE | Status: NON-FUNCTIONAL
Removed: 2024-03-28

## 2024-03-28 DEVICE — LASER FIBER SOLTIVE 365
Type: IMPLANTABLE DEVICE | Status: NON-FUNCTIONAL
Removed: 2024-03-28

## 2024-03-28 RX ORDER — SODIUM CHLORIDE 9 MG/ML
1000 INJECTION, SOLUTION INTRAVENOUS
Refills: 0 | Status: DISCONTINUED | OUTPATIENT
Start: 2024-03-28 | End: 2024-04-11

## 2024-03-28 NOTE — ASU DISCHARGE PLAN (ADULT/PEDIATRIC) - NURSING INSTRUCTIONS
Progress to regular diet as tolerated.  Keep well hydrated.     Next dose of Tylenol may be taken at 8pm

## 2024-03-28 NOTE — H&P ADULT - NSHPPHYSICALEXAM_GEN_ALL_CORE
· Constitutional	well-groomed; no distress  · Eyes	conjunctiva clear  · ENMT	no gross abnormalities  · Respiratory	clear to auscultation bilaterally; no wheezes; no rales; no rhonchi; airway patent; breath sounds equal  · Cardiovascular	regular rate and rhythm; S1 S2 present; no gallops; no rub; no murmur  · Gastrointestinal	soft; nontender; nondistended; normal active bowel sounds  · Genitourinary Comments	pre-op dx: Urethral disorders unspecified  · Neurological	sensation intact; responds to pain; responds to verbal commands; cranial nerves intact  · Mental Status	Aox3  · Skin	warm and dry; color normal; no rashes; no ulcers  · Lymphatics Comments	No anterior cervical lymphadenopathy  · Musculoskeletal	normal; normal gait; ROM intact; strength 5/5 bilateral upper extremities; strength 5/5 bilateral lower extremities  · Psychiatric	normal affect; alert and oriented x3; normal behavior

## 2024-03-28 NOTE — H&P ADULT - ASSESSMENT
44 year old male here for cystoscopy, laser epilation and transurethral resection of urethral polyp.

## 2024-03-28 NOTE — CONSULT LETTER
[FreeTextEntry1] : Dr. LINDESY KILPATRICK ,  RODRIGO MIR underwent surgery today for ablation of urethral hair and resection of a urethral poly. Please see my note below. Briefly, the patient had an uncomplicated surgery. Follow up for repeat ablation of urethral hair.  Thank you for allowing me to participate in the care of this patient. Please feel free to contact me with any questions  Tomasz Steiner MD [facility] Pediatric Urology [facility]

## 2024-03-28 NOTE — H&P ADULT - HISTORY OF PRESENT ILLNESS
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 Patient c/o mild spasms and pelvic pain but baseline, clear urethral discharge. On Augmentin prophylactically, started on 3/26/24 last dose taken this morning at 0730.

## 2024-03-28 NOTE — PROCEDURE
[FreeTextEntry1] : Urethral hair, urethral polyp [FreeTextEntry2] : Urethral hair, urethral polyp [FreeTextEntry3] : Cystoscopy, ablation of urethral hair, resection of urethral polyp [FreeTextEntry6] : Remove catheter in 24 hours

## 2024-03-28 NOTE — H&P ADULT - NSICDXPASTMEDICALHX_GEN_ALL_CORE_FT
PAST MEDICAL HISTORY:  Bladder disease or syndrome congenital bladder exstrophy    Eczema     GERD (gastroesophageal reflux disease)     H/O chronic prostatitis     H/O prostatitis     HTN (hypertension)     Recurrent urinary tract infection     SBO (small bowel obstruction)     Urethral disorder, unspecified     
Statement Selected

## 2024-03-28 NOTE — H&P ADULT - NSICDXPASTSURGICALHX_GEN_ALL_CORE_FT
PAST SURGICAL HISTORY:  H/O arthroscopy of left knee    H/O cystoscopy multiple    History of bladder surgery multiple bladder reconstruction surgery [id number]    History of colon resection 1980's    S/P small bowel resection 1999, 2010

## 2024-03-28 NOTE — ASU PREOP CHECKLIST - SELECT TESTS ORDERED
CBC/CMP/Urinalysis Cellcept Counseling:  I discussed with the patient the risks of mycophenolate mofetil including but not limited to infection/immunosuppression, GI upset, hypokalemia, hypercholesterolemia, bone marrow suppression, lymphoproliferative disorders, malignancy, GI ulceration/bleed/perforation, colitis, interstitial lung disease, kidney failure, progressive multifocal leukoencephalopathy, and birth defects.  The patient understands that monitoring is required including a baseline creatinine and regular CBC testing. In addition, patient must alert us immediately if symptoms of infection or other concerning signs are noted.

## 2024-03-28 NOTE — H&P ADULT - NSHPLABSRESULTS_GEN_ALL_CORE
Vital Signs Last 24 Hrs  T(C): 36.7 (28 Mar 2024 10:43), Max: 36.7 (28 Mar 2024 10:37)  T(F): 98.1 (28 Mar 2024 10:43), Max: 98.1 (28 Mar 2024 10:37)  HR: 66 (28 Mar 2024 10:43) (66 - 66)  BP: 147/91 (28 Mar 2024 10:43) (147/91 - 147/91)  BP(mean): --  RR: 16 (28 Mar 2024 10:43) (16 - 16)  SpO2: 100% (28 Mar 2024 10:43) (100% - 100%)    Parameters below as of 28 Mar 2024 10:37  Patient On (Oxygen Delivery Method): room air

## 2024-03-28 NOTE — ASU DISCHARGE PLAN (ADULT/PEDIATRIC) - ASU DC SPECIAL INSTRUCTIONSFT
Pain control  - Over the counter Tylenol every 6 hours and ibuprofen every 8 hours alternating  - Ditropan is an antispasmotic for the ureter, Flomax may be prescribed as well  - Dosing is available on the labels for the over the counter formulations    Activity  - No restrictions    What to expect  - Red colored urine is normal, drink plenty of fluids to help clear the blood  - your child may have urge to urinate or discomfort of the urethra, this is normal especially with a indwelling ureteral stent, drink plenty of fluids and take medication as directed    When to call  - Call if persistent nausea, vomiting, fevers >38C, or shaking chills    Follow up  - Remove catheter tomorrow

## 2024-04-05 LAB — SURGICAL PATHOLOGY STUDY: SIGNIFICANT CHANGE UP

## 2024-04-08 NOTE — ASSESSMENT
4/8/2024         RE: Rita Hardin  [address]  [place] [address]        Dear Colleague,    Thank you for referring your patient, Rita Hardin, to the [facility]. Please see a copy of my visit note below.    Date of Service: 4/8/2024    [facility] Neurology   [facility] Clinic Evaluation    Subjective: 37-year-old woman who presents in follow-up for multiple sclerosis.      She does not report any discrete new symptoms related to multiple sclerosis.    She did start kesimpta in January.  With the first injection she did experience some tingling around her lips and a couple episodes of diarrhea shortly after the injection.  She reached out to the clinic and did end up starting to take Benadryl before her injection in addition to the Tylenol.  Her most recent injection was administered on March 17.  Subsequent to that she has had recurrent spells of chest pain.  She also notes that since starting the stroke she has had multiple panic attacks.  She did present to the ER for one of the spells of chest pain.  Reassurance was provided that there was no evidence of a heart attack, though no workup was really performed.  She followed up with primary care who has ordered a cardiac workup.    She does report a couple episodes of dizziness, but generally feels like her balance is better.    The chest pain is described as a dull left-sided mid chest pain sometimes it will radiate into the left arm but when it does this she mostly notices symptoms in her fingers.  She has had a couple episodes of a deep internal squeezing sensation.    She does note that the injections that she did throughout the month of February went okay without significant adverse effect.    Disease onset: age 22, sensory myelitis, tingling hands and lhermittes  Last relapse: age 22    D3 5000 IU daily    Prior DMDs:   Betaseron 2009 to 2010, discontinued for elevated LFTs  Copaxone [id number], discontinued  [FreeTextEntry1] : 43 y/o M h/o bladder exstrophy s/p multiple reconstructive surgeries, here for further evaluation\par - the hair present in the bulbar urethra is likely the result of using paraexstrophic skin flaps during his original closure, ultimately the definitive management of this would be to excise the paraexstrophic skin and replace with buccal mucosa\par - pt is understandably apprehensive to proceed because this is a major undertaking, offered cystoscopy and depilation with laser to get rid of the hair should it be present\par - the cause of his penile discomfort is unclear, he location does not seem to be on the corporal body but rather the urethra as the corporal louie diverge more distally than a non-exstrophic patient and thus the urethra may be immediately palpable\par - had an MRI in the past, will obtain MRI pelvis to better evaluate his pelvic anatomy given this new symptom\par - he has symptoms consistent with his prior episodes of prostatitis, will prescribe him on a long term treatment dose of amoxicillin, unclear if these episodes are related, certainly the increased amounts of hair may block his ejaculatory ducts and cause issues\par - f/u pending MR findings for radiologic progression  Tecfidera February 2014 to 11/2020 generally well tolerated, though does take aspirin to control flushing side effect  Dimethyl fumarate 11/2020 - 7/2023, radiologic progression (growth of lesion at C2)  Kesimpta January 2024 to present      No Known Allergies    Current Outpatient Medications   Medication Sig Dispense Refill     aspirin (ASA) 81 MG chewable tablet Take 81 mg by mouth 2 times daily       cholecalciferol 125 MCG (5000 UT) CAPS Take 5,000 Units by mouth daily       ofatumumab (KESIMPTA) 20 MG/0.4ML injection Inject 0.4 mLs (20 mg) Subcutaneous every 28 (twenty-eight) days 0.4 mL 11     alpha-lipoic acid 600 MG capsule Take by mouth daily (Patient not taking: Reported on 4/8/2024)       dimethyl fumarate 240 MG CPDR Take 1 capsule by mouth 2 times daily       No current facility-administered medications for this visit.        Past medical, surgical, social and family history was personally reviewed. Pertinent details noted above.     Physical Examination:   /77 (BP Location: Right arm, Patient Position: Sitting, Cuff Size: Adult Regular)   Pulse 68     General: no acute distress  Cranial nerves:   VFFC  PERRL w/no RAPD  EOM full w/no ERNESTO   Face symmetric  Hearing intact  No dysarthria   Motor:   Tone is normal   Bulk is normal                           R          L  Deltoid             5          5  Biceps             5          5  Triceps            5          5  Wrist ext          5          5  Finger ext        5          5  Finger abd       5          5     Hip flexion       5          5  Knee flexion    5          5  Knee ext          5          5  Ankle d/f          5          5     Reflexes: 2+ and symmetric throughout, babinski absent bilaterally  Sensory: vibration is minimally reduced in the toes, JPS normal in the toes   Romberg is absent  Coordination: no ataxia or dysmetria  Gait: normal base and stride, tandem gait is intact, able to balance on one foot  and hop x 5 bilaterally, able to get up from chair with a single leg    Tests/Imaging:   JEROME virus Ab 0.37  Vitamin D 46    MRI brain   2/2018 - personally reviewed, mild to moderate lesion burden, predominantly periventricular, 1-2 juxtacortical lesions, 1 right cerebellar lesion and one left dorsal midbrain lesion, gd not administered  Generally stable when compared to 2016 & 17 but perhaps slight growth in some of the lesions  11/2020- no new lesions, gd-  12/2021 - no new lesions, gd-   6/2023 - no new lesions    MRI cervical spine   4/2019 - dorsal cord lesions at c2 and c4  11/2020 - no new lesions, gd-  12/2021- no new lesions, gd-   6/2023 - ?growth of lesion at c2    MRI thoracic spine   12/2019 - chronic lesions R T4 and left dorsal T9-10  11/2020 - no new lesions, gd-  12/2021 - no new lesions, gd-       Assessment: 37-year-old woman with a longstanding history of multiple sclerosis who had evidence of growth of the lesion at C2 while on dimethyl fumarate.  She is currently transitioning to kesimpta, but is struggling with side effects.    I explained to her that I do not consider her an anxious person, but I do have concerns that this medication is anxiety working for her.  We explored a couple of ways in which this medication could be provoking anxiety for her.    I recommended that she continue with kesimpta, but continue to track her chest pain episodes.  This was helpful to determine the relation of the chest pain spells with her injection.    We briefly discussed treatment alternatives if she is not able to continue with kesimpta.    We discussed how management of multiple sclerosis has changed since she was diagnosed.  Rationale was also reviewed.    Plan:   -Continue kesimpta  - Blood work today  - Follow-up in 3 months    Note was completed with the assistance of Dragon Fluency software which can often result in accidental word substitutions.     A total of 30 minutes on the date of service were  spent in the care of this patient.   Teri Whitt MD on 4/8/2024 at 9:55 AM          Again, thank you for allowing me to participate in the care of your patient.        Sincerely,        Teri Whitt MD

## 2024-04-22 LAB — BACTERIA SPEC CULT: NORMAL

## 2024-04-22 RX ORDER — AMOXICILLIN 500 MG/1
500 TABLET, FILM COATED ORAL 3 TIMES DAILY
Qty: 30 | Refills: 0 | Status: ACTIVE | COMMUNITY
Start: 2024-04-22 | End: 1900-01-01

## 2024-04-30 NOTE — ED ADULT NURSE NOTE - [facility] ED NUR SYMPTOMS POS
F/U with PCP in 2 weeks for recheck   Go to ER for increase in size, swelling, pain, or difficulty swallowing.    PAIN

## 2024-05-22 NOTE — H&P ADULT - NSICDXPASTSURGICALHX_GEN_ALL_CORE_FT
PAST SURGICAL HISTORY:  H/O arthroscopy of left knee    H/O cystoscopy multiple    History of bladder surgery multiple bladder reconstruction surgery [id number]    History of colon resection 1980's    S/P small bowel resection 1999, 2010    
Universal Safety Interventions

## 2024-07-22 ENCOUNTER — NON-APPOINTMENT (OUTPATIENT)
Age: 44
End: 2024-07-22

## 2024-10-24 DIAGNOSIS — N52.8 OTHER MALE ERECTILE DYSFUNCTION: ICD-10-CM

## 2024-10-24 RX ORDER — TADALAFIL 5 MG/1
5 TABLET ORAL
Qty: 90 | Refills: 3 | Status: ACTIVE | COMMUNITY
Start: 2024-10-24 | End: 1900-01-01

## 2024-10-25 RX ORDER — APOMORPHINE HCL
POWDER (GRAM) MISCELLANEOUS
Qty: 10 | Refills: 11 | Status: ACTIVE | COMMUNITY
Start: 2024-10-24 | End: 1900-01-01

## 2024-12-03 NOTE — ASU PATIENT PROFILE, ADULT - NSCAFFEINETYPE_GEN_ALL_CORE_SD
Spoke to pt and spouse informed them that pt PSA is rising. Most likely needs a biopsy but should have a follow-up visit to discuss this option. pt stated he has a f/u in January with Dr. Carvajal     ----- Message from Hilton Carvajal MD sent at 12/2/2024  4:20 PM EST -----  Can we notify the patient that his PSA is rising.  Most likely needs a biopsy but should have a follow-up visit to discuss this option.  I do not see that he has a follow-up at this point.   coffee

## 2024-12-05 DIAGNOSIS — N41.0 ACUTE PROSTATITIS: ICD-10-CM

## 2024-12-05 RX ORDER — AMOXICILLIN AND CLAVULANATE POTASSIUM 875; 125 MG/1; MG/1
875-125 TABLET, COATED ORAL
Qty: 28 | Refills: 0 | Status: ACTIVE | COMMUNITY
Start: 2024-12-05 | End: 1900-01-01

## 2024-12-09 LAB — BACTERIA FLD CULT: ABNORMAL

## 2025-01-02 RX ORDER — OMADACYCLINE 150 MG/1
150 TABLET, FILM COATED ORAL DAILY
Qty: 60 | Refills: 1 | Status: ACTIVE | COMMUNITY
Start: 2025-01-02 | End: 1900-01-01

## 2025-01-03 RX ORDER — AMOXICILLIN AND CLAVULANATE POTASSIUM 875; 125 MG/1; MG/1
875-125 TABLET, COATED ORAL DAILY
Qty: 30 | Refills: 0 | Status: ACTIVE | COMMUNITY
Start: 2025-01-03 | End: 1900-01-01

## 2025-02-04 NOTE — ASU PATIENT PROFILE, ADULT - TOBACCO USE
----- Message from Shelton Bautista MD sent at 2/4/2025  9:29 AM CST -----  Thyroid labs ok   I do think it may help to increase dose to 225 mcg po daily   Repeat same labs in 12 wks   Reiterate proper way to take it   Never smoker

## 2025-03-04 NOTE — REASON FOR VISIT
Noted patient will be transitioning to hospice care/comfort care.  Will defer further plans to primary team but please call renal team if questions or issues arise.  I have notified the outpatient nephrologist of the change in patient's status.  
[Follow-up Visit ___] : a follow-up visit  for [unfilled]

## 2025-05-28 NOTE — ASU PATIENT PROFILE, ADULT - NS TRANSFER PATIENT BELONGINGS
Cell Phone/PDA (specify)/Clothing Additional Notes: Patient wants to start at 20mg. Detail Level: Simple Render Risk Assessment In Note?: no

## 2025-06-25 NOTE — PATIENT PROFILE ADULT - HOW PATIENT ADDRESSED, PROFILE
Call Center TCM Note      Flowsheet Row Responses   [facility] patient discharged [facility]? [facility]   Does the patient have one of the following disease processes/diagnoses(primary or secondary)? General Surgery   TCM attempt successful? No   Unsuccessful attempts Attempt 2            PIPER Pelaez Registered Nurse    6/25/2025, 14:42 EDT         joni

## 2025-07-29 ENCOUNTER — NON-APPOINTMENT (OUTPATIENT)
Age: 45
End: 2025-07-29

## 2025-07-30 ENCOUNTER — APPOINTMENT (OUTPATIENT)
Dept: PULMONOLOGY | Facility: CLINIC | Age: 45
End: 2025-07-30
Payer: COMMERCIAL

## 2025-07-30 VITALS
HEART RATE: 76 BPM | WEIGHT: 140 LBS | HEIGHT: 67 IN | DIASTOLIC BLOOD PRESSURE: 94 MMHG | SYSTOLIC BLOOD PRESSURE: 157 MMHG | BODY MASS INDEX: 21.97 KG/M2

## 2025-07-30 DIAGNOSIS — F41.0 GENERALIZED ANXIETY DISORDER: ICD-10-CM

## 2025-07-30 DIAGNOSIS — F41.1 GENERALIZED ANXIETY DISORDER: ICD-10-CM

## 2025-07-30 DIAGNOSIS — F51.04 PSYCHOPHYSIOLOGIC INSOMNIA: ICD-10-CM

## 2025-07-30 PROCEDURE — G2211 COMPLEX E/M VISIT ADD ON: CPT

## 2025-07-30 PROCEDURE — 99203 OFFICE O/P NEW LOW 30 MIN: CPT

## 2025-08-25 LAB — URINE CULTURE <10: ABNORMAL

## 2025-08-27 ENCOUNTER — NON-APPOINTMENT (OUTPATIENT)
Age: 45
End: 2025-08-27

## 2025-08-28 LAB — BACTERIA FLD CULT: NORMAL

## (undated) DEVICE — ELCTR GROUNDING PAD INFANT COVIDIEN

## (undated) DEVICE — PACK CYSTO

## (undated) DEVICE — IRR BULB PATHFINDER + 10"

## (undated) DEVICE — TUBING TUR 2 PRONG

## (undated) DEVICE — TUBING IRR SET FOR CYSTOSCOPY 77"

## (undated) DEVICE — BASIN SET DOUBLE

## (undated) DEVICE — SOL IRR BAG NS 0.9% 3000ML

## (undated) DEVICE — GLV 7 PROTEXIS (WHITE)

## (undated) DEVICE — GOWN XL W TOWEL

## (undated) DEVICE — VENODYNE/SCD SLEEVE CALF MEDIUM

## (undated) DEVICE — CLAMP ALLIGATOR (SINGLE JAW) 3FR X 65CM

## (undated) DEVICE — DRAPE TOWEL BLUE 17" X 24"

## (undated) DEVICE — WARMING BLANKET UPPER ADULT

## (undated) DEVICE — PREP BETADINE KIT

## (undated) DEVICE — Device

## (undated) DEVICE — ADAPTER CHECK FLO 9FR STERILE

## (undated) DEVICE — ELCTR ROCKER SWITCH PENCIL BLUE 10FT

## (undated) DEVICE — POSITIONER FOAM EGG CRATE ULNAR 2PCS (PINK)

## (undated) DEVICE — TUBING CONNECTING FOR DRAINAGE BAG MALE / FEMALE 14FR 30CM

## (undated) DEVICE — WARMING BLANKET UNDERBODY PEDS 36 X 33"

## (undated) DEVICE — TUBING LEVEL ONE NORMOFLO SET

## (undated) DEVICE — DRAPE COVER SNAP 36X30"

## (undated) DEVICE — SOL IRR POUR H2O 500ML

## (undated) DEVICE — ELCTR GROUNDING PAD ADULT COVIDIEN

## (undated) DEVICE — FOLEY HOLDER STATLOCK 2 WAY ADULT

## (undated) DEVICE — POSITIONER STRAP ARMBOARD VELCRO TS-30

## (undated) DEVICE — SOL IRR BAG H2O 3000ML

## (undated) DEVICE — CANISTER DISPOSABLE THIN WALL 3000CC

## (undated) DEVICE — LIJ/LIA-LASER, LUMENIS (HOLMIUM) #1 561: Type: DURABLE MEDICAL EQUIPMENT

## (undated) DEVICE — LABELS BLANK W PEN

## (undated) DEVICE — DRAINAGE BAG URINARY 2L

## (undated) DEVICE — FOLEY CATH 2-WAY 12FR 5CC LATEX FREE

## (undated) DEVICE — CATH IV JELCO 16G X 1.25" (GRAY)